# Patient Record
Sex: MALE | Race: WHITE | NOT HISPANIC OR LATINO | Employment: OTHER | URBAN - METROPOLITAN AREA
[De-identification: names, ages, dates, MRNs, and addresses within clinical notes are randomized per-mention and may not be internally consistent; named-entity substitution may affect disease eponyms.]

---

## 2017-01-17 PROBLEM — R07.9 CHEST PAIN: Status: ACTIVE | Noted: 2017-01-17

## 2017-01-17 PROBLEM — R00.0 TACHYCARDIA: Status: ACTIVE | Noted: 2017-01-17

## 2017-01-19 ENCOUNTER — OFFICE VISIT (OUTPATIENT)
Dept: CARDIOLOGY | Facility: CLINIC | Age: 72
End: 2017-01-19
Attending: FAMILY MEDICINE
Payer: COMMERCIAL

## 2017-01-19 VITALS
BODY MASS INDEX: 22.54 KG/M2 | HEIGHT: 65 IN | WEIGHT: 135.3 LBS | DIASTOLIC BLOOD PRESSURE: 58 MMHG | SYSTOLIC BLOOD PRESSURE: 144 MMHG | HEART RATE: 74 BPM

## 2017-01-19 DIAGNOSIS — R00.0 TACHYCARDIA: Primary | ICD-10-CM

## 2017-01-19 DIAGNOSIS — I47.10 SVT (SUPRAVENTRICULAR TACHYCARDIA) (H): ICD-10-CM

## 2017-01-19 DIAGNOSIS — E78.5 HYPERLIPIDEMIA WITH TARGET LDL LESS THAN 130: ICD-10-CM

## 2017-01-19 PROCEDURE — 99204 OFFICE O/P NEW MOD 45 MIN: CPT | Mod: 25 | Performed by: INTERNAL MEDICINE

## 2017-01-19 PROCEDURE — 93000 ELECTROCARDIOGRAM COMPLETE: CPT | Performed by: INTERNAL MEDICINE

## 2017-01-19 RX ORDER — FLUTICASONE PROPIONATE 50 MCG
1 SPRAY, SUSPENSION (ML) NASAL PRN
COMMUNITY
End: 2022-11-30

## 2017-01-19 RX ORDER — SIMVASTATIN 20 MG
10 TABLET ORAL AT BEDTIME
COMMUNITY
End: 2018-12-17

## 2017-01-19 NOTE — PROGRESS NOTES
HPI and Plan:   See dictation    Orders Placed This Encounter   Procedures     EKG 12-lead complete w/read - Clinics (performed today)     Orders Placed This Encounter   Medications     simvastatin (ZOCOR) 20 MG tablet     Sig: Take 10 mg by mouth At Bedtime     fluticasone (FLONASE) 50 MCG/ACT spray     Sig: Spray 1 spray into both nostrils as needed for rhinitis or allergies     Medications Discontinued During This Encounter   Medication Reason     simvastatin (ZOCOR) 20 MG tablet Medication Reconciliation Clean Up         Encounter Diagnoses   Name Primary?     Tachycardia Yes     Hyperlipidemia with target LDL less than 130      SVT (supraventricular tachycardia) (H)        CURRENT MEDICATIONS:  Current Outpatient Prescriptions   Medication Sig Dispense Refill     simvastatin (ZOCOR) 20 MG tablet Take 10 mg by mouth At Bedtime       fluticasone (FLONASE) 50 MCG/ACT spray Spray 1 spray into both nostrils as needed for rhinitis or allergies       zolpidem (AMBIEN) 5 MG tablet Take 1 tablet (5 mg) by mouth nightly as needed for sleep 30 tablet 1     albuterol (PROAIR HFA, PROVENTIL HFA, VENTOLIN HFA) 108 (90 BASE) MCG/ACT inhaler Inhale 2 puffs into the lungs every 6 hours as needed for shortness of breath / dyspnea or wheezing 3 Inhaler 1     fluticasone-salmeterol (ADVAIR) 500-50 MCG/DOSE diskus inhaler Inhale 1 puff into the lungs every 12 hours       aspirin 81 MG tablet Take by mouth daily       Omega-3 Fatty Acids (OMEGA-3 FISH OIL PO)          ALLERGIES     Allergies   Allergen Reactions     Cyclobenzaprine Unknown     Flushing     Seasonal Allergies      Spring and fall allergies       PAST MEDICAL HISTORY:  Past Medical History   Diagnosis Date     Deafness in right ear 2002     Ringing in ears      HL (hearing loss)      Left     Tachycardia        PAST SURGICAL HISTORY:  Past Surgical History   Procedure Laterality Date     Ent surgery  1979     ear surgery       FAMILY HISTORY:  Family History   Problem  "Relation Age of Onset     CANCER Father 62     Lung cancer as a result of smoking     High cholesterol Mother      HEART DISEASE Maternal Grandfather      HEART DISEASE Paternal Grandmother      HEART DISEASE Paternal Grandfather      CEREBROVASCULAR DISEASE Maternal Grandfather      Allergies Mother      Asthma Father      High cholesterol Mother      High cholesterol Father        SOCIAL HISTORY:  Social History     Social History     Marital Status:      Spouse Name: N/A     Number of Children: N/A     Years of Education: N/A     Social History Main Topics     Smoking status: Never Smoker      Smokeless tobacco: Never Used     Alcohol Use: 0.0 oz/week     0 Standard drinks or equivalent per week      Comment: 2 galsses most days     Drug Use: No     Sexual Activity: Yes     Other Topics Concern     Special Diet No     Exercise Yes     Social History Narrative       Review of Systems:  Skin:  Negative     Eyes:  Positive for glasses  ENT:  Positive for hearing loss  Respiratory:  Positive for    Cardiovascular:    Positive for;palpitations  Gastroenterology: Negative    Genitourinary:  Negative    Musculoskeletal:  Negative    Neurologic:  Negative    Psychiatric:  Negative    Heme/Lymph/Imm:  Positive for allergies  Endocrine:  Negative            Physical Exam:  Vitals: /58 mmHg  Pulse 74  Ht 1.651 m (5' 5\")  Wt 61.372 kg (135 lb 4.8 oz)  BMI 22.52 kg/m2  Constitutional: cooperative, alert and oriented, well developed, well nourished, in no acute distress   Skin: warm and dry to the touch, no apparent skin lesions or masses noted   Head: normocephalic, no masses or lesions   Eyes: pupils equal and round, conjunctivae and lids unremarkable, sclera white, no xanthalasma, EOMS intact, no nystagmus   ENT: no pallor or cyanosis, dentition good   Neck: carotid pulses are full and equal bilaterally, JVP normal, no carotid bruit, no thyromegaly   Chest: normal breath sounds, clear to auscultation, " normal A-P diameter, normal symmetry, normal respiratory excursion, no use of accessory muscles   Cardiac: regular rhythm, normal S1/S2, no S3 or S4, apical impulse not displaced, no murmurs, gallops or rubs no presence of murmur   Abdomen: abdomen soft, non-tender, BS normoactive, no mass, no HSM, no bruits   Vascular: pulses full and equal, no bruits auscultated   Extremities and Back: no deformities, clubbing, cyanosis, erythema observed   Neurological: affect appropriate, oriented to time, person and place     Recent Lab Results:  LIPID RESULTS:  Lab Results   Component Value Date    CHOL 203* 12/23/2015    HDL 52 12/23/2015    * 12/23/2015    TRIG 147 12/23/2015    CHOLHDLRATIO 4.7 10/23/2014       LIVER ENZYME RESULTS:  No results found for: AST, ALT    CBC RESULTS:  No results found for: WBC, RBC, HGB, HCT, MCV, MCH, MCHC, RDW, PLT    BMP RESULTS:  Lab Results   Component Value Date    GLC 95 12/23/2015        A1C RESULTS:  No results found for: A1C    INR RESULTS:  No results found for: INR        CC  Lux Juarez MD  [facility]  [address] [address]

## 2017-01-19 NOTE — LETTER
1/19/2017    Lux Juarez MD  [facility]  [address]  [place] [address]    RE: Bear Guido       Dear Colleague,    I had the pleasure of seeing Bear Guido in the [facility].    Mr. Guido is a very pleasant 71-year-old gentleman without previous cardiovascular history who presented to the urgent care in 07/2016 with some atypical chest discomfort and palpitations, heart racing.  Urgent care did an EKG, and they describe a junctional tachycardia.  They do not state the rate.  The Care Everywhere states that it is an accelerated junctional rhythm with small P waves may be present, nonspecific ST abnormalities.  Again, no rate is given.  He had a chest x-ray done which was negative.  He had a stress echocardiogram done 2 days following which was normal after 7-1/2 minutes of exercise.  For followup of this from the [facility], it was recommended he follow up with Cardiology and I am seeing him here today.  He has had minimal recurrences more short-lived and is not particularly precipitated by any particular event.  He does have asthma and uses inhalers on a daily basis.  At the time in the summer when it came on, he was sitting in [employer] and doing no regular physical activity.  He is an avid biker and has no difficulty with exercise.  He has no history of myocardial infarction, congestive heart failure or prior stroke.  He denies any PND, orthopnea or pedal edema.  He has no history of sleep apnea.     Outpatient Encounter Prescriptions as of 1/19/2017   Medication Sig Dispense Refill     simvastatin (ZOCOR) 20 MG tablet Take 10 mg by mouth At Bedtime       fluticasone (FLONASE) 50 MCG/ACT spray Spray 1 spray into both nostrils as needed for rhinitis or allergies       zolpidem (AMBIEN) 5 MG tablet Take 1 tablet (5 mg) by mouth nightly as needed for sleep 30 tablet 1     albuterol (PROAIR HFA, PROVENTIL HFA, VENTOLIN HFA) 108 (90 BASE) MCG/ACT  inhaler Inhale 2 puffs into the lungs every 6 hours as needed for shortness of breath / dyspnea or wheezing 3 Inhaler 1     fluticasone-salmeterol (ADVAIR) 500-50 MCG/DOSE diskus inhaler Inhale 1 puff into the lungs every 12 hours       aspirin 81 MG tablet Take by mouth daily       Omega-3 Fatty Acids (OMEGA-3 FISH OIL PO)        [DISCONTINUED] simvastatin (ZOCOR) 20 MG tablet Take 1 tablet (20 mg) by mouth At Bedtime 90 tablet prn     No facility-administered encounter medications on file as of 1/19/2017.       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  EKG today here is normal.  He does have hyperlipidemia.  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  He at this point in time is not interested in any medical therapy given the minor and brief episodes.  He understands that if they should return, he is to contact us for consideration of medical therapy and/or if that fails, consideration of ablative therapy.     Again, thank you for allowing me to participate in the care of your patient.      Sincerely,    OMAYRA WASHINGTON MD     [facility]

## 2017-01-20 NOTE — PROGRESS NOTES
HISTORY OF PRESENT ILLNESS:  Mr. Guido is a very pleasant 71-year-old gentleman without previous cardiovascular history who presented to the urgent care in 07/2016 with some atypical chest discomfort and palpitations, heart racing.  Urgent care did an EKG, and they describe a junctional tachycardia.  They do not state the rate.  The Care Everywhere states that it is an accelerated junctional rhythm with small P waves may be present, nonspecific ST abnormalities.  Again, no rate is given.  He had a chest x-ray done which was negative.  He had a stress echocardiogram done 2 days following which was normal after 7-1/2 minutes of exercise.  For followup of this from the [facility], it was recommended he follow up with Cardiology and I am seeing him here today.  He has had minimal recurrences more short-lived and is not particularly precipitated by any particular event.  He does have asthma and uses inhalers on a daily basis.  At the time in the summer when it came on, he was sitting in [employer] and doing no regular physical activity.  He is an avid biker and has no difficulty with exercise.  He has no history of myocardial infarction, congestive heart failure or prior stroke.  He denies any PND, orthopnea or pedal edema.  He has no history of sleep apnea.      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  EKG today here is normal.  He does have hyperlipidemia.  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  He at this point in time is not interested in any medical therapy given the minor and brief episodes.  He understands that if they should return, he is to contact us for consideration of medical therapy and/or if that fails, consideration of ablative therapy.      Ernesto Washington MD, [facility]         ERNESTO WASHINGTON MD FACC              D: 2017 14:07   T: 2017 03:39   MT: ELIOT      Name:     LUIS MIGUEL GUZMAN   MRN:      [id number]        Account:      [id number]   :      1945           Service Date: 2017      Document: [id number]

## 2017-02-06 ENCOUNTER — TELEPHONE (OUTPATIENT)
Dept: FAMILY MEDICINE | Facility: CLINIC | Age: 72
End: 2017-02-06

## 2017-05-03 DIAGNOSIS — F51.02 TRANSIENT INSOMNIA: ICD-10-CM

## 2017-05-03 RX ORDER — ZOLPIDEM TARTRATE 5 MG/1
5 TABLET ORAL
Qty: 30 TABLET | Refills: 1 | Status: SHIPPED | OUTPATIENT
Start: 2017-05-03 | End: 2018-03-15

## 2017-05-03 NOTE — TELEPHONE ENCOUNTER
MP,    Controlled Substance Refill Request for Ambien    Last refill: 2/17/2017 #30    Last clinic visit: 9/28/2016     Clinic visit frequency required: Q 12 months  Next appt: none    Controlled substance agreement on file: No.    Documentation in problem list reviewed:  Yes    Processing:  Fax Rx to [facility]    [place]) reviewed:  reviewed- no concerns  [place]:  [email or web address]    Please authorize if appropriate.  Thanks,  Tanisha ALMARAZ RN

## 2017-05-03 NOTE — TELEPHONE ENCOUNTER
Zolpidem       Last Written Prescription Date:  10/10/2016  Last Fill Quantity: 30,   # refills: 1  Last Office Visit with [facility], [facility] or  Health prescribing provider: 09/28/2016  Future Office visit:       Routing refill request to provider for review/approval because:  Drug not on the [facility], [facility] or  Health refill protocol or controlled substance

## 2017-06-26 ENCOUNTER — OFFICE VISIT (OUTPATIENT)
Dept: FAMILY MEDICINE | Facility: CLINIC | Age: 72
End: 2017-06-26
Payer: COMMERCIAL

## 2017-06-26 VITALS
SYSTOLIC BLOOD PRESSURE: 112 MMHG | DIASTOLIC BLOOD PRESSURE: 68 MMHG | BODY MASS INDEX: 22.56 KG/M2 | OXYGEN SATURATION: 98 % | TEMPERATURE: 97.5 F | WEIGHT: 135.44 LBS | HEIGHT: 65 IN | HEART RATE: 89 BPM

## 2017-06-26 DIAGNOSIS — H01.001 BLEPHARITIS OF RIGHT UPPER EYELID, UNSPECIFIED TYPE: Primary | ICD-10-CM

## 2017-06-26 PROCEDURE — 99213 OFFICE O/P EST LOW 20 MIN: CPT | Performed by: FAMILY MEDICINE

## 2017-06-26 RX ORDER — CEPHALEXIN 500 MG/1
500 CAPSULE ORAL 3 TIMES DAILY
Qty: 30 CAPSULE | Refills: 0 | Status: SHIPPED | OUTPATIENT
Start: 2017-06-26 | End: 2017-06-26 | Stop reason: ALTCHOICE

## 2017-06-26 RX ORDER — MINOCYCLINE HYDROCHLORIDE 50 MG/1
50 CAPSULE ORAL 2 TIMES DAILY
Qty: 42 CAPSULE | Refills: 0 | Status: SHIPPED | OUTPATIENT
Start: 2017-06-26 | End: 2017-07-17

## 2017-06-26 NOTE — MR AVS SNAPSHOT
"              After Visit Summary   6/26/2017    Bear Guido    MRN: [id number]           Patient Information     Date Of Birth          1945        Visit Information        Provider Department      6/26/2017 12:45 PM Brunilda Julien MD [facility]        Today's Diagnoses     Moderate persistent asthma    -  1    Cellulitis of face        Blepharitis of right upper eyelid, unspecified type           Follow-ups after your visit        Who to contact     If you have questions or need follow up information about today's clinic visit or your schedule please contact [facility] directly at [phone].  Normal or non-critical lab and imaging results will be communicated to you by MyChart, letter or phone within 4 business days after the clinic has received the results. If you do not hear from us within 7 days, please contact the clinic through [employer] or phone. If you have a critical or abnormal lab result, we will notify you by phone as soon as possible.  Submit refill requests through [employer] or call your pharmacy and they will forward the refill request to us. Please allow 3 business days for your refill to be completed.          Additional Information About Your Visit        MyChart Information     [employer] gives you secure access to your electronic health record. If you see a primary care provider, you can also send messages to your care team and make appointments. If you have questions, please call your primary care clinic.  If you do not have a primary care provider, please call [phone] and they will assist you.        Care EveryWhere ID     This is your Care EveryWhere ID. This could be used by other organizations to access your [place] medical records  [id number]        Your Vitals Were     Pulse Temperature Height Pulse Oximetry BMI (Body Mass Index)       89 97.5  F (36.4  C) (Oral) 5' 5\" (1.651 m) 98% 22.54 kg/m2        Blood Pressure from Last 3 Encounters: "   06/26/17 112/68   01/19/17 144/58   09/28/16 100/60    Weight from Last 3 Encounters:   06/26/17 135 lb 7 oz (61.4 kg)   01/19/17 135 lb 4.8 oz (61.4 kg)   09/28/16 132 lb (59.9 kg)              We Performed the Following     Asthma Action Plan (AAP)          Today's Medication Changes          These changes are accurate as of: 6/26/17  1:52 PM.  If you have any questions, ask your nurse or doctor.               Start taking these medicines.        Dose/Directions    minocycline 50 MG capsule   Commonly known as:  MINOCIN/DYNACIN   Used for:  Blepharitis of right upper eyelid, unspecified type   Started by:  Brunilda Julien MD        Dose:  50 mg   Take 1 capsule (50 mg) by mouth 2 times daily for 21 days   Quantity:  42 capsule   Refills:  0            Where to get your medicines      These medications were sent to [place] [address] [place] [address]     Phone:  [phone]     minocycline 50 MG capsule                Primary Care Provider Office Phone # Fax #    Lux Juarez [phone]       [facility] [address] [place] [address]        Equal Access to Services     NAZIA WHITAKER AH: Hadii mckenzie ku hadasho Soomaali, waaxda luqadaha, qaybta kaalmada adeegyada, leoncio correiain haymanfredn rosaline morales. So [facility] [phone].    ATENCIÓN: Si habla español, tiene a garcias disposición servicios gratuitos de asistencia lingüística. Riccoame al [phone].    We comply with applicable federal civil rights laws and [place] laws. We do not discriminate on the basis of race, color, national origin, age, disability sex, sexual orientation or gender identity.            Thank you!     Thank you for choosing [facility]  for your care. Our goal is always to provide you with excellent care. Hearing back from our patients is one way we can continue to improve our services. Please take a few  minutes to complete the written survey that you may receive in the mail after your visit with us. Thank you!             Your Updated Medication List - Protect others around you: Learn how to safely use, store and throw away your medicines at www.disposemymeds.org.          This list is accurate as of: 6/26/17  1:52 PM.  Always use your most recent med list.                   Brand Name Dispense Instructions for use Diagnosis    albuterol 108 (90 BASE) MCG/ACT Inhaler    PROAIR HFA/PROVENTIL HFA/VENTOLIN HFA    3 Inhaler    Inhale 2 puffs into the lungs every 6 hours as needed for shortness of breath / dyspnea or wheezing        aspirin 81 MG tablet      Take by mouth daily        FLONASE 50 MCG/ACT spray   Generic drug:  fluticasone      Spray 1 spray into both nostrils as needed for rhinitis or allergies        fluticasone-salmeterol 500-50 MCG/DOSE diskus inhaler    ADVAIR     Inhale 1 puff into the lungs every 12 hours        minocycline 50 MG capsule    MINOCIN/DYNACIN    42 capsule    Take 1 capsule (50 mg) by mouth 2 times daily for 21 days    Blepharitis of right upper eyelid, unspecified type       OMEGA-3 FISH OIL PO           simvastatin 20 MG tablet    ZOCOR     Take 10 mg by mouth At Bedtime        zolpidem 5 MG tablet    AMBIEN    30 tablet    Take 1 tablet (5 mg) by mouth nightly as needed for sleep    Transient insomnia

## 2017-06-26 NOTE — PROGRESS NOTES
SUBJECTIVE:                                                    Bear Guido is a 71 year old male who presents to clinic today for the following health issues:      Eye(s) Problem      Duration: 5 days    Description:  Location: right  Pain: YES  Redness: YES  Discharge: no    Accompanying signs and symptoms: no new products no know foreign object in eye. Painful eye     History (Trauma, foreign body exposure,): None    Precipitating or alleviating factors (contact use): None    Therapies tried and outcome: hot compass      Red and tender eyelid - spreading up and getting worse    Problem list and histories reviewed & adjusted, as indicated.  Additional history: as documented    Patient Active Problem List   Diagnosis     Hyperlipidemia with target LDL less than 130     Moderate persistent asthma     Allergic rhinitis     Traumatic myositis ossificans     Vitreous degeneration     History of hematuria     Hyperglycemia     Transient insomnia     Tachycardia     Chest pain     Past Surgical History:   Procedure Laterality Date     ENT SURGERY  1979    ear surgery       Social History   Substance Use Topics     Smoking status: Never Smoker     Smokeless tobacco: Never Used     Alcohol use 0.0 oz/week     0 Standard drinks or equivalent per week      Comment: 2 galsses most days     Family History   Problem Relation Age of Onset     CANCER Father 62     Lung cancer as a result of smoking     High cholesterol Mother      HEART DISEASE Maternal Grandfather      HEART DISEASE Paternal Grandmother      HEART DISEASE Paternal Grandfather      CEREBROVASCULAR DISEASE Maternal Grandfather      Allergies Mother      Asthma Father      High cholesterol Mother      High cholesterol Father          Current Outpatient Prescriptions   Medication Sig Dispense Refill     minocycline (MINOCIN/DYNACIN) 50 MG capsule Take 1 capsule (50 mg) by mouth 2 times daily for 21 days 42 capsule 0     simvastatin (ZOCOR) 20 MG tablet Take 10 mg  "by mouth At Bedtime       fluticasone-salmeterol (ADVAIR) 500-50 MCG/DOSE diskus inhaler Inhale 1 puff into the lungs every 12 hours       Omega-3 Fatty Acids (OMEGA-3 FISH OIL PO)        zolpidem (AMBIEN) 5 MG tablet Take 1 tablet (5 mg) by mouth nightly as needed for sleep (Patient not taking: Reported on 6/26/2017) 30 tablet 1     fluticasone (FLONASE) 50 MCG/ACT spray Spray 1 spray into both nostrils as needed for rhinitis or allergies       albuterol (PROAIR HFA, PROVENTIL HFA, VENTOLIN HFA) 108 (90 BASE) MCG/ACT inhaler Inhale 2 puffs into the lungs every 6 hours as needed for shortness of breath / dyspnea or wheezing 3 Inhaler 1     aspirin 81 MG tablet Take by mouth daily         Reviewed and updated as needed this visit by clinical staff       Reviewed and updated as needed this visit by Provider         ROS:  Constitutional, HEENT, cardiovascular, pulmonary, gi and gu systems are negative, except as otherwise noted.    OBJECTIVE:                                                    /68 (BP Location: Left arm, Patient Position: Left side, Cuff Size: Adult Regular)  Pulse 89  Temp 97.5  F (36.4  C) (Oral)  Ht 5' 5\" (1.651 m)  Wt 135 lb 7 oz (61.4 kg)  SpO2 98%  BMI 22.54 kg/m2  Body mass index is 22.54 kg/(m^2).  GENERAL APPEARANCE: healthy, alert and no distress  HENT: right eyelid puffy red and swollen spreading to upper eyelid near brow  Tender  EYE: eom intact pupils equal and round         ASSESSMENT/PLAN:                                                    1. Blepharitis of right upper eyelid, unspecified type  Encouraged to call if no better  Cautioned about avoiding sun with this medication  - minocycline (MINOCIN/DYNACIN) 50 MG capsule; Take 1 capsule (50 mg) by mouth 2 times daily for 21 days  Dispense: 42 capsule; Refill: 0      Follow up with Provider - as needed     Brunilda Julien MD  [facility]  '  "

## 2017-06-27 ASSESSMENT — ASTHMA QUESTIONNAIRES: ACT_TOTALSCORE: 25

## 2017-09-21 ENCOUNTER — OFFICE VISIT (OUTPATIENT)
Dept: FAMILY MEDICINE | Facility: CLINIC | Age: 72
End: 2017-09-21
Payer: COMMERCIAL

## 2017-09-21 VITALS
SYSTOLIC BLOOD PRESSURE: 104 MMHG | HEART RATE: 84 BPM | OXYGEN SATURATION: 95 % | DIASTOLIC BLOOD PRESSURE: 72 MMHG | TEMPERATURE: 98.7 F | WEIGHT: 135 LBS | BODY MASS INDEX: 22.49 KG/M2 | RESPIRATION RATE: 14 BRPM | HEIGHT: 65 IN

## 2017-09-21 DIAGNOSIS — F51.02 TRANSIENT INSOMNIA: ICD-10-CM

## 2017-09-21 DIAGNOSIS — R19.5 LOOSE STOOLS: ICD-10-CM

## 2017-09-21 DIAGNOSIS — Z00.00 ROUTINE HISTORY AND PHYSICAL EXAMINATION OF ADULT: Primary | ICD-10-CM

## 2017-09-21 DIAGNOSIS — Z23 FLU VACCINE NEED: ICD-10-CM

## 2017-09-21 DIAGNOSIS — E78.5 HYPERLIPIDEMIA WITH TARGET LDL LESS THAN 130: ICD-10-CM

## 2017-09-21 DIAGNOSIS — Z12.11 SPECIAL SCREENING FOR MALIGNANT NEOPLASMS, COLON: ICD-10-CM

## 2017-09-21 PROCEDURE — 90662 IIV NO PRSV INCREASED AG IM: CPT | Performed by: FAMILY MEDICINE

## 2017-09-21 PROCEDURE — G0008 ADMIN INFLUENZA VIRUS VAC: HCPCS | Performed by: FAMILY MEDICINE

## 2017-09-21 PROCEDURE — 99213 OFFICE O/P EST LOW 20 MIN: CPT | Mod: 25 | Performed by: FAMILY MEDICINE

## 2017-09-21 PROCEDURE — 99397 PER PM REEVAL EST PAT 65+ YR: CPT | Mod: 25 | Performed by: FAMILY MEDICINE

## 2017-09-21 RX ORDER — SIMVASTATIN 20 MG
10 TABLET ORAL AT BEDTIME
Qty: 90 TABLET | Refills: 1 | Status: CANCELLED | OUTPATIENT
Start: 2017-09-21

## 2017-09-21 RX ORDER — ZOLPIDEM TARTRATE 5 MG/1
5 TABLET ORAL
Qty: 30 TABLET | Refills: 1 | Status: CANCELLED | OUTPATIENT
Start: 2017-09-21

## 2017-09-21 NOTE — PROGRESS NOTES
SUBJECTIVE:   Bear Guido is a 72 year old male who presents for Preventive Visit.    Are you in the first 12 months of your Medicare Part B coverage?  No    Healthy Habits:    Do you get at least three servings of calcium containing foods daily (dairy, green leafy vegetables, etc.)? yes    Amount of exercise or daily activities, outside of work: 7 day(s) per week    Problems taking medications regularly No    Medication side effects: No    Have you had an eye exam in the past two years? yes    Do you see a dentist twice per year? No-once    Do you have sleep apnea, excessive snoring or daytime drowsiness?no    COGNITIVE SCREEN  1) Repeat 3 items (Banana, Sunrise, Chair)    2) Clock draw: NORMAL  3) 3 item recall: Recalls 3 objects  Results: 3 items recalled: COGNITIVE IMPAIRMENT LESS LIKELY    Mini-CogTM Copyright S Helder. Licensed by the author for use in [facility]; reprinted with permission (cruz@[facility]). All rights reserved.      Concerns: change in bowel habits    Reviewed and updated as needed this visit [facility]  M[facility]         Reviewed and updated as needed this visit by Provider        Social History   Substance Use Topics     Smoking status: Never Smoker     Smokeless tobacco: Never Used     Alcohol use 0.0 oz/week     0 Standard drinks or equivalent per week      Comment: 2 galsses most days       The patient does not drink >3 drinks per day nor >7 drinks per week.    Today's PHQ-2 Score:   PHQ-2 ( 1999 Pfizer) 9/28/2016 9/28/2016   Q1: Little interest or pleasure in doing things 0 0   Q2: Feeling down, depressed or hopeless 0 0   PHQ-2 Score 0 0         Do you feel safe in your environment - Yes    Do you have a Health Care Directive?: Yes: Patient states has Advance Directive and will bring in a copy to clinic.    Current providers sharing in care for this patient include: Patient Care Team:  Russell Alcantara MD as PCP - General (Family Practice)      Hearing  impairment: Yes, Deaf in right ear     Difficulty following a conversation in a noisy restaurant or crowded room.    Need to ask people to speak up or repeat themselves.    Difficulty understanding soft or whispered speech.    Sometimes wears hearing aids    Ability to successfully perform activities of daily living: Yes, no assistance needed     Fall risk:  Fallen 2 or more times in the past year?: No  Any fall with injury in the past year?: No      Home safety:  lack of grab bars in the bathroom      The following health maintenance items are reviewed in Epic and correct as of today:Health Maintenance   Topic Date Due     HEPATITIS C SCREENING  08/19/1964     ADVANCE DIRECTIVE PLANNING Q5 YRS  08/19/2001     PNEUMOCOCCAL (2 of 2 - PPSV23) 10/21/2016     INFLUENZA VACCINE (SYSTEM ASSIGNED)  09/01/2017     FALL RISK ASSESSMENT  09/28/2017     ASTHMA CONTROL TEST Q6 MOS  12/26/2017     ASTHMA ACTION PLAN Q1 YR  06/26/2018     COLONOSCOPY Q10 YR  10/01/2018     LIPID SCREEN Q5 YR MALE (SYSTEM ASSIGNED)  12/23/2020     TETANUS IMMUNIZATION (SYSTEM ASSIGNED)  01/06/2026     AORTIC ANEURYSM SCREENING (SYSTEM ASSIGNED)  Completed     BP Readings from Last 3 Encounters:   09/21/17 104/72   06/26/17 112/68   01/19/17 144/58    Wt Readings from Last 3 Encounters:   09/21/17 135 lb (61.2 kg)   06/26/17 135 lb 7 oz (61.4 kg)   01/19/17 135 lb 4.8 oz (61.4 kg)                  Patient Active Problem List   Diagnosis     Hyperlipidemia with target LDL less than 130     Moderate persistent asthma     Allergic rhinitis     Traumatic myositis ossificans     Vitreous degeneration     History of hematuria     Hyperglycemia     Transient insomnia     Tachycardia     Chest pain     Past Surgical History:   Procedure Laterality Date     ENT SURGERY  1979    ear surgery       Social History   Substance Use Topics     Smoking status: Never Smoker     Smokeless tobacco: Never Used     Alcohol use 0.0 oz/week     0 Standard drinks or  equivalent per week      Comment: 2 galsses most days     Family History   Problem Relation Age of Onset     High cholesterol Mother      Allergies Mother      CANCER Father 62     Lung cancer as a result of smoking     Asthma Father      High cholesterol Father      HEART DISEASE Maternal Grandfather      CEREBROVASCULAR DISEASE Maternal Grandfather      HEART DISEASE Paternal Grandmother      HEART DISEASE Paternal Grandfather          Current Outpatient Prescriptions   Medication Sig Dispense Refill     zolpidem (AMBIEN) 5 MG tablet Take 1 tablet (5 mg) by mouth nightly as needed for sleep 30 tablet 1     simvastatin (ZOCOR) 20 MG tablet Take 10 mg by mouth At Bedtime       fluticasone (FLONASE) 50 MCG/ACT spray Spray 1 spray into both nostrils as needed for rhinitis or allergies       albuterol (PROAIR HFA, PROVENTIL HFA, VENTOLIN HFA) 108 (90 BASE) MCG/ACT inhaler Inhale 2 puffs into the lungs every 6 hours as needed for shortness of breath / dyspnea or wheezing 3 Inhaler 1     fluticasone-salmeterol (ADVAIR) 500-50 MCG/DOSE diskus inhaler Inhale 1 puff into the lungs every 12 hours       Omega-3 Fatty Acids (OMEGA-3 FISH OIL PO)        aspirin 81 MG tablet Take by mouth daily       Allergies   Allergen Reactions     Cyclobenzaprine Unknown     Flushing     Seasonal Allergies      Spring and fall allergies     Recent Labs   Lab Test  12/23/15   0908  10/23/14   0808   LDL  122*  138*   HDL  52  50   TRIG  147  244*              Pneumonia Vaccine:   Immunization History   Administered Date(s) Administered     HEPA 11/15/1995, 02/17/1997     HepB 09/30/2009, 12/08/2009, 09/30/2010     Influenza (High Dose) 3 valent vaccine 10/21/2015, 09/28/2016, 09/21/2017     Influenza (IIV3) 10/15/2014     Pneumococcal (PCV 13) 10/21/2015     Pneumococcal 23 valent 07/20/2009     TDAP Vaccine (Adacel) 01/06/2016     Tdap (Adacel,Boostrix) 12/28/2007     Typhoid Oral 10/20/2014     Zoster vaccine, live 10/28/2014   UTD      in  "addition to health maintanance patient would like to discuss the following problem:  He also complains of intermittent loose stool, gas and flatulence at today's visit.  Normally has firm regular stools  Not every day but frequently, started maybe 6 weeks or more ago  No dark stool or blood  No dietary changes No dysphagia.     Problem pertinent Exam shows no abdominal tenderness or mass.     Reviewed the criteria for diagnosing irritable bowel syndrome is primarily a diagnosis of exclusion.  It may be that but we cannot rule out the possibility of mass or other problem    Recommended checking some labs when he comes back for future orders for lipid panel  Also referral to gastroenterology for additional evaluation    STD screening:  History   Sexual Activity     Sexual activity: Yes     no screening desired today        ROS: As per HPI.  Constitutional: no recent illness, no fevers/sweats/chills, sleep normal  Eyes: No vision changes or eye irritation  Ears/Nose/Throat: No runny nose, sore throat or ear pain  CV: no palpitations, no chest pain, no lower extremity swelling.  Resp: no shortness of breath, wheezing, or cough.  GI: no nausea/vomiting/diarrhea, normal stooling pattern, no reflux symptoms, no black or bloody stools  : no burning or urgency with urination, no blood in urine, no sores or discharge.  Skin: no changing moles or other lesions, no rash  Musculoskeletal: no joint pain, muscle pain, weakness, trauma or injury  Psych: no depression, no concerns about anxiety  Neuro: no new headaches, dizziness    Objective:  /72  Pulse 84  Temp 98.7  F (37.1  C) (Oral)  Resp 14  Ht 5' 5\" (1.651 m)  Wt 135 lb (61.2 kg)  SpO2 95%  BMI 22.47 kg/m2  General Appearance: Pleasant, alert, WN/WD in no acute respiratory distress.  Head Exam: Normal. Normocephalic, atraumatic. No sinus tenderness.  Eye Exam: Normal external eye, conjunctiva, lids. JHONATAN.  Ear Exam: Normal auditory canals and external ears. " "Non-tender.  Left TM-normal. Right TM-normal.  OroPharynx Exam: Dental hygiene adequate. Normal buccal mucosa. Normal pharynx.  Neck Exam: Supple, no masses or enlarged, tender nodes.  Thyroid Exam: No nodules or enlargement or tenderness.  Chest/Respiratory Exam: Normal, comfortable, easy respirations. Chest wall normal. Lungs are clear to auscultation. No wheezing, crackles, or rhonchi.  Cardiovascular Exam: Regular rate and rhythm. No murmur, gallop, or rubs. No pedal edema.  Gastrointestinal Exam: Soft, non-tender, no masses or organomegaly.  Musculoskeletal Exam: Back is non-tender, full ROM of upper and lower extremities.  Skin: no rash, warm and dry.  Neurologic Exam: Nonfocal, no tremor. Normal gait.  Psychiatric Exam: Alert - appropriate, normal affect    ASSESSMENT/PLAN:    ICD-10-CM    1. Routine history and physical examination of adult Z00.00    2. Transient insomnia F51.02    3. Hyperlipidemia with target LDL less than 130 E78.5 **Lipid panel reflex to direct LDL FUTURE anytime   4. Special screening for malignant neoplasms, colon Z12.11 GASTROENTEROLOGY ADULT REF PROCEDURE ONLY   5. Loose stools R19.5 **Comprehensive metabolic panel FUTURE 1yr   6. Flu vaccine need Z23 FLU VACCINE, INCREASED ANTIGEN, PRESV FREE       End of Life Planning:  Patient currently has an advanced directive: not on file    COUNSELING:  Reviewed preventive health counseling, as reflected in patient instructions       Regular exercise       Healthy diet/nutrition       Vision screening       Hearing screening       Colon cancer screening       Prostate cancer screening        Estimated body mass index is 22.54 kg/(m^2) as calculated from the following:    Height as of 6/26/17: 5' 5\" (1.651 m).    Weight as of 6/26/17: 135 lb 7 oz (61.4 kg).     reports that he has never smoked. He has never used smokeless tobacco.        Appropriate preventive services were discussed with this patient, including applicable screening as " appropriate for cardiovascular disease, diabetes, osteopenia/osteoporosis, and glaucoma.  As appropriate for age/gender, discussed screening for colorectal cancer, prostate cancer, breast cancer, and cervical cancer. Checklist reviewing preventive services available has been given to the patient.    Reviewed patients plan of care and provided an AVS. The Intermediate Care Plan ( asthma action plan, low back pain action plan, and migraine action plan) for Bear meets the Care Plan requirement. This Care Plan has been established and reviewed with the Patient.    Counseling Resources:  ATP IV Guidelines  Pooled Cohorts Equation Calculator  Breast Cancer Risk Calculator  FRAX Risk Assessment  ICSI Preventive Guidelines  Dietary Guidelines for Americans, 2010  USDA's MyPlate  ASA Prophylaxis  Lung CA Screening    Russell Murali Alcantara MD  [facility]

## 2017-09-21 NOTE — MR AVS SNAPSHOT
After Visit Summary   9/21/2017    Bear Guido    MRN: [id number]           Patient Information     Date Of Birth          1945        Visit Information        Provider Department      9/21/2017 4:00 PM Russell Alcantara MD [facility]        Today's Diagnoses     Routine history and physical examination of adult    -  1    Transient insomnia        Hyperlipidemia with target LDL less than 130        Special screening for malignant neoplasms, colon        Loose stools        Flu vaccine need          Care Instructions      Preventive Health Recommendations:       Male Ages 65 and over    Yearly exam:             See your health care provider every year in order to  o   Review health changes.   o   Discuss preventive care.    o   Review your medicines if your doctor has prescribed any.    Talk with your health care provider about whether you should have a test to screen for prostate cancer (PSA).    Every 3 years, have a diabetes test (fasting glucose). If you are at risk for diabetes, you should have this test more often.    Every 5 years, have a cholesterol test. Have this test more often if you are at risk for high cholesterol or heart disease.     Every 10 years, have a colonoscopy. Or, have a yearly FIT test (stool test). These exams will check for colon cancer.    Talk to with your health care provider about screening for Abdominal Aortic Aneurysm if you have a family history of AAA or have a history of smoking.  Shots:     Get a flu shot each year.     Get a tetanus shot every 10 years.     Talk to your doctor about your pneumonia vaccines. There are now two you should receive - Pneumovax (PPSV 23) and Prevnar (PCV 13).    Talk to your doctor about a shingles vaccine.     Talk to your doctor about the hepatitis B vaccine.  Nutrition:     Eat at least 5 servings of fruits and vegetables each day.     Eat whole-grain bread, whole-wheat pasta and brown rice instead of white  grains and rice.     Talk to your doctor about Calcium and Vitamin D.   Lifestyle    Exercise for at least 150 minutes a week (30 minutes a day, 5 days a week). This will help you control your weight and prevent disease.     Limit alcohol to one drink per day.     No smoking.     Wear sunscreen to prevent skin cancer.     See your dentist every six months for an exam and cleaning.     See your eye doctor every 1 to 2 years to screen for conditions such as glaucoma, macular degeneration and cataracts.          Follow-ups after your visit        Additional Services     GASTROENTEROLOGY ADULT REF PROCEDURE ONLY       Last Lab Result: No results found for: CR  Body mass index is 22.47 kg/(m^2).      Patient will be contacted to schedule procedure.     Please be aware that coverage of these services is subject to the terms and limitations of your health insurance plan.  Call member services at your health plan with any benefit or coverage questions.  Any procedures must be performed at a [facility] facility OR coordinated by your clinic's referral office.    Please bring the following with you to your appointment:    (1) Any X-Rays, CTs or MRIs which have been performed.  Contact the facility where they were done to arrange for  prior to your scheduled appointment.    (2) List of current medications   (3) This referral request   (4) Any documents/labs given to you for this referral                  Your next 10 appointments already scheduled     Oct 10, 2017   Procedure with Ervin Irizarry MD   [facility] ([facility])    [address]  [place] [address] [phone]           [facility] is located at [address] [place]              Who to contact     If you have questions or need follow up information about today's clinic visit or your schedule please contact [facility] directly at [phone].  Normal or non-critical lab and imaging  "results will be communicated to you by MyChart, letter or phone within 4 business days after the clinic has received the results. If you do not hear from us within 7 days, please contact the clinic through [employer] or phone. If you have a critical or abnormal lab result, we will notify you by phone as soon as possible.  Submit refill requests through [employer] or call your pharmacy and they will forward the refill request to us. Please allow 3 business days for your refill to be completed.          Additional Information About Your Visit        [employer] Information     [employer] gives you secure access to your electronic health record. If you see a primary care provider, you can also send messages to your care team and make appointments. If you have questions, please call your primary care clinic.  If you do not have a primary care provider, please call [phone] and they will assist you.        Care EveryWhere ID     This is your Care EveryWhere ID. This could be used by other organizations to access your [facility] medical records  [id number]        Your Vitals Were     Pulse Temperature Respirations Height Pulse Oximetry BMI (Body Mass Index)    84 98.7  F (37.1  C) (Oral) 14 5' 5\" (1.651 m) 95% 22.47 kg/m2       Blood Pressure from Last 3 Encounters:   09/21/17 104/72   06/26/17 112/68   01/19/17 144/58    Weight from Last 3 Encounters:   09/21/17 135 lb (61.2 kg)   06/26/17 135 lb 7 oz (61.4 kg)   01/19/17 135 lb 4.8 oz (61.4 kg)              We Performed the Following     FLU VACCINE, INCREASED ANTIGEN, PRESV FREE     GASTROENTEROLOGY ADULT REF PROCEDURE ONLY     OFFICE/OUTPT VISIT,EST,LEVL III        Primary Care Provider Office Phone # Fax #    Russell Murali Alcantara [phone] [address] [facility] [address]        Equal Access to Services     NAZIA WHITAKER : Gregory Johnson, waaxda luqadaha, qaybta kaalmalibby lazar, leoncio mathews . " So [facility] [phone].    ATENCIÓN: Si seniala martina, tiene a garcias disposición servicios gratuitos de asistencia lingüística. Makayla child [phone].    We comply with applicable federal civil rights laws and [place] laws. We do not discriminate on the basis of race, color, national origin, age, disability sex, sexual orientation or gender identity.            Thank you!     Thank you for choosing [facility]  for your care. Our goal is always to provide you with excellent care. Hearing back from our patients is one way we can continue to improve our services. Please take a few minutes to complete the written survey that you may receive in the mail after your visit with us. Thank you!             Your Updated Medication List - Protect others around you: Learn how to safely use, store and throw away your medicines at www.disposemymeds.org.          This list is accurate as of: 9/21/17 11:59 PM.  Always use your most recent med list.                   Brand Name Dispense Instructions for use Diagnosis    albuterol 108 (90 BASE) MCG/ACT Inhaler    PROAIR HFA/PROVENTIL HFA/VENTOLIN HFA    3 Inhaler    Inhale 2 puffs into the lungs every 6 hours as needed for shortness of breath / dyspnea or wheezing        aspirin 81 MG tablet      Take by mouth daily        FLONASE 50 MCG/ACT spray   Generic drug:  fluticasone      Spray 1 spray into both nostrils as needed for rhinitis or allergies        fluticasone-salmeterol 500-50 MCG/DOSE diskus inhaler    ADVAIR     Inhale 1 puff into the lungs every 12 hours        OMEGA-3 FISH OIL PO           simvastatin 20 MG tablet    ZOCOR     Take 10 mg by mouth At Bedtime        zolpidem 5 MG tablet    AMBIEN    30 tablet    Take 1 tablet (5 mg) by mouth nightly as needed for sleep    Transient insomnia

## 2017-09-21 NOTE — NURSING NOTE
"Chief Complaint   Patient presents with     Wellness Visit       Initial /72  Pulse 84  Temp 98.7  F (37.1  C) (Oral)  Resp 14  Ht 5' 5\" (1.651 m)  Wt 135 lb (61.2 kg)  SpO2 95%  BMI 22.47 kg/m2 Estimated body mass index is 22.47 kg/(m^2) as calculated from the following:    Height as of this encounter: 5' 5\" (1.651 m).    Weight as of this encounter: 135 lb (61.2 kg).  BP completed using cuff size: regular    Health Maintenance that is potentially due pending provider review:  Health Maintenance Due   Topic Date Due     HEPATITIS C SCREENING  08/19/1964     ADVANCE DIRECTIVE PLANNING Q5 YRS  08/19/2001     PNEUMOCOCCAL (2 of 2 - PPSV23) 10/21/2016     INFLUENZA VACCINE (SYSTEM ASSIGNED)  09/01/2017     FALL RISK ASSESSMENT  09/28/2017         Labs and vaccines per provider  "

## 2017-10-10 ENCOUNTER — SURGERY (OUTPATIENT)
Age: 72
End: 2017-10-10

## 2017-10-10 ENCOUNTER — HOSPITAL ENCOUNTER (OUTPATIENT)
Facility: CLINIC | Age: 72
Discharge: HOME | End: 2017-10-10
Attending: SPECIALIST | Admitting: SPECIALIST
Payer: MEDICARE

## 2017-10-10 VITALS
RESPIRATION RATE: 18 BRPM | SYSTOLIC BLOOD PRESSURE: 122 MMHG | DIASTOLIC BLOOD PRESSURE: 78 MMHG | OXYGEN SATURATION: 97 %

## 2017-10-10 LAB — COLONOSCOPY: NORMAL

## 2017-10-10 PROCEDURE — 25000128 H RX IP 250 OP 636: Performed by: SPECIALIST

## 2017-10-10 PROCEDURE — G0500 MOD SEDAT ENDO SERVICE >5YRS: HCPCS | Performed by: SPECIALIST

## 2017-10-10 PROCEDURE — 99153 MOD SED SAME PHYS/QHP EA: CPT

## 2017-10-10 PROCEDURE — G0121 COLON CA SCRN NOT HI RSK IND: HCPCS | Performed by: SPECIALIST

## 2017-10-10 PROCEDURE — 45378 DIAGNOSTIC COLONOSCOPY: CPT | Performed by: SPECIALIST

## 2017-10-10 RX ORDER — FENTANYL CITRATE 50 UG/ML
INJECTION, SOLUTION INTRAMUSCULAR; INTRAVENOUS PRN
Status: DISCONTINUED | OUTPATIENT
Start: 2017-10-10 | End: 2017-10-10 | Stop reason: HOSPADM

## 2017-10-10 RX ORDER — ONDANSETRON 2 MG/ML
4 INJECTION INTRAMUSCULAR; INTRAVENOUS
Status: DISCONTINUED | OUTPATIENT
Start: 2017-10-10 | End: 2017-10-10 | Stop reason: HOSPADM

## 2017-10-10 RX ORDER — LIDOCAINE 40 MG/G
CREAM TOPICAL
Status: DISCONTINUED | OUTPATIENT
Start: 2017-10-10 | End: 2017-10-10 | Stop reason: HOSPADM

## 2017-10-10 RX ADMIN — MIDAZOLAM HYDROCHLORIDE 1 MG: 1 INJECTION, SOLUTION INTRAMUSCULAR; INTRAVENOUS at 13:46

## 2017-10-10 RX ADMIN — MIDAZOLAM HYDROCHLORIDE 0.5 MG: 1 INJECTION, SOLUTION INTRAMUSCULAR; INTRAVENOUS at 13:52

## 2017-10-10 RX ADMIN — FENTANYL CITRATE 25 MCG: 50 INJECTION, SOLUTION INTRAMUSCULAR; INTRAVENOUS at 13:52

## 2017-10-10 RX ADMIN — MIDAZOLAM HYDROCHLORIDE 0.5 MG: 1 INJECTION, SOLUTION INTRAMUSCULAR; INTRAVENOUS at 14:05

## 2017-10-10 RX ADMIN — FENTANYL CITRATE 25 MCG: 50 INJECTION, SOLUTION INTRAMUSCULAR; INTRAVENOUS at 14:05

## 2017-10-10 RX ADMIN — FENTANYL CITRATE 50 MCG: 50 INJECTION, SOLUTION INTRAMUSCULAR; INTRAVENOUS at 13:46

## 2017-10-10 NOTE — H&P
Pre-Endoscopy History and Physical     Bear Guido MRN# [id number]   YOB: 1945 Age: 72 year old     Date of Procedure: 10/10/2017  Primary care provider: Russell Alcantara  Type of Endoscopy: Colonoscopy with possible biopsy, possible polypectomy  Reason for Procedure: screening  Type of Anesthesia Anticipated: Conscious Sedation    HPI:    Bear is a 72 year old male who will be undergoing the above procedure.      A history and physical has been performed. The patient's medications and allergies have been reviewed. The risks and benefits of the procedure and the sedation options and risks were discussed with the patient.  All questions were answered and informed consent was obtained.      He denies a personal or family history of anesthesia complications or bleeding disorders.     Patient Active Problem List   Diagnosis     Hyperlipidemia with target LDL less than 130     Moderate persistent asthma     Allergic rhinitis     Traumatic myositis ossificans     Vitreous degeneration     History of hematuria     Hyperglycemia     Transient insomnia     Tachycardia     Chest pain        Past Medical History:   Diagnosis Date     Deafness in right ear 2002     HL (hearing loss)     Left     Ringing in ears      Tachycardia      Uncomplicated asthma         Past Surgical History:   Procedure Laterality Date     ENT SURGERY  1979    ear surgery       Social History   Substance Use Topics     Smoking status: Never Smoker     Smokeless tobacco: Never Used     Alcohol use 0.0 oz/week     0 Standard drinks or equivalent per week      Comment: 1-2 galsses most days       Family History   Problem Relation Age of Onset     High cholesterol Mother      Allergies Mother      CANCER Father 62     Lung cancer as a result of smoking     Asthma Father      High cholesterol Father      HEART DISEASE Maternal Grandfather      CEREBROVASCULAR DISEASE Maternal Grandfather      HEART DISEASE Paternal Grandmother       "HEART DISEASE Paternal Grandfather        Prior to Admission medications    Medication Sig Start Date End Date Taking? Authorizing Provider   simvastatin (ZOCOR) 20 MG tablet Take 10 mg by mouth At Bedtime   Yes Reported, Patient   fluticasone-salmeterol (ADVAIR) 500-50 MCG/DOSE diskus inhaler Inhale 1 puff into the lungs every 12 hours   Yes Reported, Patient   zolpidem (AMBIEN) 5 MG tablet Take 1 tablet (5 mg) by mouth nightly as needed for sleep 5/3/17   Lux Juarez MD   fluticasone (FLONASE) 50 MCG/ACT spray Spray 1 spray into both nostrils as needed for rhinitis or allergies    Reported, Patient   albuterol (PROAIR HFA, PROVENTIL HFA, VENTOLIN HFA) 108 (90 BASE) MCG/ACT inhaler Inhale 2 puffs into the lungs every 6 hours as needed for shortness of breath / dyspnea or wheezing 1/5/16   Lux Juarez MD   aspirin 81 MG tablet Take by mouth daily    Reported, Patient   Omega-3 Fatty Acids (OMEGA-3 FISH OIL PO)     Reported, Patient       Allergies   Allergen Reactions     Cyclobenzaprine Unknown     Flushing     Seasonal Allergies      Spring and fall allergies        REVIEW OF SYSTEMS:   5 point ROS negative except as noted above in HPI, including Gen., Resp., CV, GI &  system review.    PHYSICAL EXAM:   /79  Resp 16  SpO2 94% Estimated body mass index is 22.47 kg/(m^2) as calculated from the following:    Height as of 9/21/17: 1.651 m (5' 5\").    Weight as of 9/21/17: 61.2 kg (135 lb).   GENERAL APPEARANCE: alert, and oriented  MENTAL STATUS: alert  AIRWAY EXAM: Mallampatti Class II (visualization of the soft palate, fauces, and uvula)  RESP: lungs clear to auscultation - no rales, rhonchi or wheezes  CV: regular rates and rhythm  DIAGNOSTICS:    Not indicated    IMPRESSION   ASA Class 2 - Mild systemic disease    PLAN:   Plan for Colonoscopy with possible biopsy, possible polypectomy. We discussed the risks, benefits and alternatives and the patient wished to proceed.    The above has " been forwarded to the consulting provider.      Signed Electronically by: Ervin Irizarry  October 10, 2017

## 2017-10-10 NOTE — BRIEF OP NOTE
[facility] Brief Operative Note    Pre-operative diagnosis: screening   Post-operative diagnosis Normal     Procedure: Procedure(s):  colonoscopy - Wound Class: II-Clean Contaminated   Surgeon(s): Surgeon(s) and Role:     * Ervin Irizarry MD - Primary   Estimated blood loss: * No values recorded between 10/10/2017 12:00 AM and 10/10/2017  2:19 PM *    Specimens: * No specimens in log *   Findings: Please see ProVation procedure note in Chart Review

## 2018-01-09 ENCOUNTER — TELEPHONE (OUTPATIENT)
Dept: FAMILY MEDICINE | Facility: CLINIC | Age: 73
End: 2018-01-09

## 2018-01-09 NOTE — TELEPHONE ENCOUNTER
JF  Symptoms started Sunday at 8pm, feeling weakness, and muscle aches. Next day then had fever of 101.5 taking ibuprofen and brings down temp to  degrees. Persistent cough with more chest tightness than usual. Has been using advair but with inhalation coughs it out so not as effective.    With pt's age and dx of asthma appropriate to send in oseltamivir?  Thank you,  Bhavya Vieira RN

## 2018-01-09 NOTE — TELEPHONE ENCOUNTER
Reason for call:  Patient reporting a symptom    Symptom or request: possible influenza-fever, joint aches, weakness, cough    Duration (how long have symptoms been present): 3 days    Have you been treated for this before? No    Additional comments: patient would like to schedule an OV, please call to discuss symptoms.    Phone Number patient can be reached at:  Home number on file [phone] (home)    Best Time:  asap    Can we leave a detailed message on this number:  YES    Call taken on 1/9/2018 at 1:38 PM by Celeste Vieira  .

## 2018-01-11 NOTE — TELEPHONE ENCOUNTER
Patient informed of below message  Was better last night - today back to feeling poorly  Very low energy, coughing, and fever continues to range from   Was having asthma issues at first d/t coughing - asthma now well controlled he said  Will continue to monitor and call us back if needed  Tanisha ALMARAZ RN

## 2018-01-11 NOTE — TELEPHONE ENCOUNTER
Sorry,72 hours is the cut off, and tamiflu is not terribly helpful, probably wouldn't have been helpful.  PLease call to check on pt.

## 2018-01-19 ENCOUNTER — OFFICE VISIT (OUTPATIENT)
Dept: FAMILY MEDICINE | Facility: CLINIC | Age: 73
End: 2018-01-19
Payer: COMMERCIAL

## 2018-01-19 VITALS
OXYGEN SATURATION: 98 % | SYSTOLIC BLOOD PRESSURE: 114 MMHG | WEIGHT: 133 LBS | HEIGHT: 65 IN | DIASTOLIC BLOOD PRESSURE: 72 MMHG | TEMPERATURE: 98 F | RESPIRATION RATE: 14 BRPM | HEART RATE: 88 BPM | BODY MASS INDEX: 22.16 KG/M2

## 2018-01-19 DIAGNOSIS — H69.92 DYSFUNCTION OF LEFT EUSTACHIAN TUBE: Primary | ICD-10-CM

## 2018-01-19 PROCEDURE — 99213 OFFICE O/P EST LOW 20 MIN: CPT | Performed by: FAMILY MEDICINE

## 2018-01-19 NOTE — PROGRESS NOTES
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  He reports that over the past 3 days has been eating muffled sounds and decreased hearing on the left ear.  The patient has a history of deafness of right ear due to an infection and has been wearing hearing aid on the left to augment hearing. He denies any fevers or chills. Have been using OTC medications for congestion x 5 days.   Denies any other respiratory symptoms.  Denies ear pain, just pressure.     Problem list and histories reviewed & adjusted, as indicated.  Additional history: as documented    Patient Active Problem List   Diagnosis     Hyperlipidemia with target LDL less than 130     Moderate persistent asthma     Allergic rhinitis     Traumatic myositis ossificans     Vitreous degeneration     History of hematuria     Hyperglycemia     Transient insomnia     Tachycardia     Chest pain     Past Surgical History:   Procedure Laterality Date     COLONOSCOPY N/A 10/10/2017    Procedure: COLONOSCOPY;  colonoscopy;  Surgeon: Ervin Irizarry MD;  Location:  GI     ENT SURGERY  [facility]    ear surgery       Social History   Substance Use Topics     Smoking status: Never Smoker     Smokeless tobacco: Never Used     Alcohol use 0.0 oz/week     0 Standard drinks or equivalent per week      Comment: 1-2 galsses most days     Family History   Problem Relation Age of Onset     High cholesterol Mother      Allergies Mother      CANCER Father 62     Lung cancer as a result of  "smoking     Asthma Father      High cholesterol Father      HEART DISEASE Maternal Grandfather      CEREBROVASCULAR DISEASE Maternal Grandfather      HEART DISEASE Paternal Grandmother      HEART DISEASE Paternal Grandfather              Reviewed and updated as needed this visit by clinical staff       ROS:  Constitutional, HEENT, cardiovascular, pulmonary, gi and gu systems are negative, except as otherwise noted.      OBJECTIVE:   /72  Pulse 88  Temp 98  F (36.7  C) (Tympanic)  Resp 14  Ht 5' 5\" (1.651 m)  Wt 133 lb (60.3 kg)  SpO2 98%  BMI 22.13 kg/m2  Body mass index is 22.13 kg/(m^2).  GENERAL: healthy, alert and no distress  HENT: Ear canals patent.  Clear fluid effusion behind the left tympanic membrane.  Tympanic membrane is intact, nonbulging,  no erythema was noted.  RESP: coarse breath sounds in the right lung field.  CV: regular rate and rhythm, normal S1 S2, no S3 or S4, no murmur, click or rub, no peripheral edema and peripheral pulses strong    ASSESSMENT/PLAN:   1. Dysfunction of left eustachian tube  Assessment: Discussed eustachian tube dysfunction, causes, and OTC vs RX treatments  Plan:  -  I advised him to continue trying to autoinsufflate the ears several times per day.  I advised him that 80% of patients will have resolution of their eustachian tube dysfunction after approximately 2 months of symptoms.  If he does not experience return of adequate hearing, he should return to clinic for referral to ENT and audiogram/ reevaluation.  - Flonase as needed.     Khris Lane MD  [facility]  PAGER: [phone]        "

## 2018-01-19 NOTE — MR AVS SNAPSHOT
After Visit Summary   1/19/2018    Bear Guido    MRN: [id number]           Patient Information     Date Of Birth          1945        Visit Information        Provider Department      1/19/2018 11:00 AM Khris Lane MD [facility]        Today's Diagnoses     Dysfunction of left eustachian tube    -  1      Care Instructions      Anatomy of the Ear    The ear is a complex and delicate organ. It collects sound waves so you can hear the world around you. The ear also has a second function--it helps you keep your balance. Your ear can be divided into 3 parts. The outer ear and middle ear help collect and amplify sound. The inner ear converts sound waves to messages that are sent to the brain. The inner ear also senses the movement and position of your head and body so you can maintain your balance and see clearly, even when you change positions.  The mastoid bone surrounds the middle ear. The external ear collects sound waves. The ear canal carries sound waves to the eardrum. The eardrum vibrates from sound waves, setting the middle ear bones in motion. The middle ear bones (ossicles) vibrate, transmitting sound waves to the inner ear. When the ear is healthy, air pressure remains balanced in the middle ear. The eustachian tube helps control air pressure in the middle ear. The semicircular canals help maintain balance. The vestibular nerve carries balance signals to the brain. The auditory nerve carries sound signals to the brain. The cochlea picks up sound waves and makes nerve signals.     Date Last Reviewed: 10/1/2016    1691-2128 [employer]. [address] [place] [address]. All rights reserved. This information is not intended as a substitute for professional medical care. Always follow your healthcare professional's instructions.            Follow-ups after your visit        Follow-up notes from your care team     Return in about 4 days (around  "1/23/2018).      Who to contact     If you have questions or need follow up information about today's clinic visit or your schedule please contact [facility] directly at [phone].  Normal or non-critical lab and imaging results will be communicated to you by MyChart, letter or phone within 4 business days after the clinic has received the results. If you do not hear from us within 7 days, please contact the clinic through MyChart or phone. If you have a critical or abnormal lab result, we will notify you by phone as soon as possible.  Submit refill requests through [employer] or call your pharmacy and they will forward the refill request to us. Please allow 3 business days for your refill to be completed.          Additional Information About Your Visit        [employer] Information     [employer] gives you secure access to your electronic health record. If you see a primary care provider, you can also send messages to your care team and make appointments. If you have questions, please call your primary care clinic.  If you do not have a primary care provider, please call [phone] and they will assist you.        Care EveryWhere ID     This is your Care EveryWhere ID. This could be used by other organizations to access your [facility] medical records  [id number]        Your Vitals Were     Pulse Temperature Respirations Height Pulse Oximetry BMI (Body Mass Index)    88 98  F (36.7  C) (Tympanic) 14 5' 5\" (1.651 m) 98% 22.13 kg/m2       Blood Pressure from Last 3 Encounters:   01/19/18 114/72   10/10/17 122/78   09/21/17 104/72    Weight from Last 3 Encounters:   01/19/18 133 lb (60.3 kg)   09/21/17 135 lb (61.2 kg)   06/26/17 135 lb 7 oz (61.4 kg)              Today, you had the following     No orders found for display       Primary Care Provider Office Phone # Fax #    Russell Murali Alcantara [phone] [address] [facility] [address]        Equal Access to Services     " NAZIA [facility]: Hadii aad ku allison Johnson, waaxda luqadaha, qaybta kaalmada adekevin, leoncio brenin hayaatamera stacywilliam murillojean pierre mathews . So [facility] [phone].    ATENCIÓN: Si habla español, tiene a garcias disposición servicios gratuitos de asistencia lingüística. Llame al [phone].    We comply with applicable federal civil rights laws and [place] laws. We do not discriminate on the basis of race, color, national origin, age, disability, sex, sexual orientation, or gender identity.            Thank you!     Thank you for choosing [facility]  for your care. Our goal is always to provide you with excellent care. Hearing back from our patients is one way we can continue to improve our services. Please take a few minutes to complete the written survey that you may receive in the mail after your visit with us. Thank you!             Your Updated Medication List - Protect others around you: Learn how to safely use, store and throw away your medicines at www.disposemymeds.org.          This list is accurate as of: 1/19/18 11:34 AM.  Always use your most recent med list.                   Brand Name Dispense Instructions for use Diagnosis    albuterol 108 (90 BASE) MCG/ACT Inhaler    PROAIR HFA/PROVENTIL HFA/VENTOLIN HFA    3 Inhaler    Inhale 2 puffs into the lungs every 6 hours as needed for shortness of breath / dyspnea or wheezing        aspirin 81 MG tablet      Take by mouth daily        FLONASE 50 MCG/ACT spray   Generic drug:  fluticasone      Spray 1 spray into both nostrils as needed for rhinitis or allergies        fluticasone-salmeterol 500-50 MCG/DOSE diskus inhaler    ADVAIR     Inhale 1 puff into the lungs every 12 hours        OMEGA-3 FISH OIL PO           simvastatin 20 MG tablet    ZOCOR     Take 10 mg by mouth At Bedtime        zolpidem 5 MG tablet    AMBIEN    30 tablet    Take 1 tablet (5 mg) by mouth nightly as needed for sleep    Transient insomnia

## 2018-01-19 NOTE — NURSING NOTE
"Chief Complaint   Patient presents with     Hearing Problem     hearing change-dizziness/vertigo       Initial /72  Pulse 88  Temp 98  F (36.7  C) (Tympanic)  Resp 14  Ht 5' 5\" (1.651 m)  Wt 133 lb (60.3 kg)  SpO2 98%  BMI 22.13 kg/m2 Estimated body mass index is 22.13 kg/(m^2) as calculated from the following:    Height as of this encounter: 5' 5\" (1.651 m).    Weight as of this encounter: 133 lb (60.3 kg).  BP completed using cuff size: regular    Health Maintenance that is potentially due pending provider review:  Health Maintenance Due   Topic Date Due     HEPATITIS C SCREENING  08/19/1964     ADVANCE DIRECTIVE PLANNING Q5 YRS  08/19/2001     PNEUMOCOCCAL (2 of 2 - PPSV23) 10/21/2016     ASTHMA CONTROL TEST Q6 MOS  12/26/2017         PHQ/ACT/EVELYN--Gave pt questionnare and per provider  "

## 2018-01-19 NOTE — PATIENT INSTRUCTIONS
Anatomy of the Ear    The ear is a complex and delicate organ. It collects sound waves so you can hear the world around you. The ear also has a second function--it helps you keep your balance. Your ear can be divided into 3 parts. The outer ear and middle ear help collect and amplify sound. The inner ear converts sound waves to messages that are sent to the brain. The inner ear also senses the movement and position of your head and body so you can maintain your balance and see clearly, even when you change positions.  The mastoid bone surrounds the middle ear. The external ear collects sound waves. The ear canal carries sound waves to the eardrum. The eardrum vibrates from sound waves, setting the middle ear bones in motion. The middle ear bones (ossicles) vibrate, transmitting sound waves to the inner ear. When the ear is healthy, air pressure remains balanced in the middle ear. The eustachian tube helps control air pressure in the middle ear. The semicircular canals help maintain balance. The vestibular nerve carries balance signals to the brain. The auditory nerve carries sound signals to the brain. The cochlea picks up sound waves and makes nerve signals.     Date Last Reviewed: 10/1/2016    7056-1469 [employer]. [address] [place] [address], [place] [address]. All rights reserved. This information is not intended as a substitute for professional medical care. Always follow your healthcare professional's instructions.

## 2018-01-22 ENCOUNTER — OFFICE VISIT (OUTPATIENT)
Dept: AUDIOLOGY | Facility: CLINIC | Age: 73
End: 2018-01-22
Payer: COMMERCIAL

## 2018-01-22 ENCOUNTER — CARE COORDINATION (OUTPATIENT)
Dept: OTOLARYNGOLOGY | Facility: CLINIC | Age: 73
End: 2018-01-22

## 2018-01-22 DIAGNOSIS — H91.20 SUDDEN HEARING LOSS: Primary | ICD-10-CM

## 2018-01-22 DIAGNOSIS — H90.3 SENSORY HEARING LOSS, BILATERAL: Primary | ICD-10-CM

## 2018-01-22 DIAGNOSIS — H91.92 DECREASED HEARING OF LEFT EAR: Primary | ICD-10-CM

## 2018-01-22 DIAGNOSIS — H91.20 SUDDEN HEARING LOSS: ICD-10-CM

## 2018-01-22 RX ORDER — PREDNISONE 10 MG/1
10 TABLET ORAL DAILY
Qty: 74 TABLET | Refills: 0 | Status: SHIPPED | OUTPATIENT
Start: 2018-01-22 | End: 2018-02-14

## 2018-01-22 RX ORDER — PREDNISONE 10 MG/1
10 TABLET ORAL DAILY
Qty: 74 TABLET | Refills: 0 | Status: SHIPPED | OUTPATIENT
Start: 2018-01-22 | End: 2018-01-22

## 2018-01-22 NOTE — MR AVS SNAPSHOT
After Visit Summary   1/22/2018    Bear Guido    MRN: [id number]           Patient Information     Date Of Birth          1945        Visit Information        Provider Department      1/22/2018 10:30 AM María Pichardo AuD M [facility] Audiology        Today's Diagnoses     Sensory hearing loss, bilateral    -  1       Follow-ups after your visit        Your next 10 appointments already scheduled     Feb 14, 2018  8:30 AM CST   Walk In From ENT with Ghassan Plummer [facility] Audiology ([facility])    [address] [place] [id number] [phone]            Feb 14, 2018  9:30 AM CST   (Arrive by 9:15 AM)   RETURN NEUROTOLOGY with MD EDI Roman [facility] Ear Nose and Throat ([facility])    [address] [place] [id number] [phone]              Future tests that were ordered for you today     Open Future Orders        Priority Expected Expires Ordered    Hearing Test, Comprehensive (Audiogram) ([address]) Routine  7/21/2018 1/22/2018            Who to contact     Please call your clinic at [phone] to:    Ask questions about your health    Make or cancel appointments    Discuss your medicines    Learn about your test results    Speak to your doctor   If you have compliments or concerns about an experience at your clinic, or if you wish to file a complaint, please contact [facility] Physicians Patient Relations at [phone] or email us at [email or web address]         Additional Information About Your Visit        MyChart Information     [employer] gives you secure access to your electronic health record. If you see a primary care provider, you can also send messages to your care team and make appointments. If you have questions, please call your primary care clinic.  If you do not have a primary care provider, please call [phone] and they will  assist you.      [employer] is an electronic gateway that provides easy, online access to your medical records. With [employer], you can request a clinic appointment, read your test results, renew a prescription or communicate with your care team.     To access your existing account, please contact your [facility] or call [phone] for assistance.        Care EveryWhere ID     This is your Care EveryWhere ID. This could be used by other organizations to access your [facility] medical records  [id number]         Blood Pressure from Last 3 Encounters:   01/19/18 114/72   10/10/17 122/78   09/21/17 104/72    Weight from Last 3 Encounters:   01/19/18 60.3 kg (133 lb)   09/21/17 61.2 kg (135 lb)   06/26/17 61.4 kg (135 lb 7 oz)              We Performed the Following     AUDIOGRAM/TYMPANOGRAM - INTERFACE     [facility] Audiometry Thrshld Eval & Speech Recog (740[address])     Tymps / Reflex   (09597)          Today's Medication Changes          These changes are accurate as of: 1/22/18  1:12 PM.  If you have any questions, ask your nurse or doctor.               Start taking these medicines.        Dose/Directions    omeprazole 20 MG CR capsule   Commonly known as:  priLOSEC   Used for:  Sudden hearing loss   Started by:  Danielle Spears MD        Dose:  20 mg   Take 1 capsule (20 mg) by mouth daily   Quantity:  30 capsule   Refills:  0       predniSONE 10 MG tablet   Commonly known as:  DELTASONE   Used for:  Sudden hearing loss   Started by:  Danielle Spears MD        Dose:  10 mg   Take 1 tablet (10 mg) by mouth daily 60 mg daily for 10 days, 50 mg times one day, 40 mg times one day, 30 mg times one day, 20 mg daily times one day, 10 mg daily times one day.   Quantity:  74 tablet   Refills:  0            Where to get your medicines      These medications were sent to [place], [place] - [address] [facility] [address] [place] [address] [address] [place] [address]     Hours:  TRANSPLANT PHONE NUMBER [phone] Phone:  [phone]     omeprazole 20 MG CR capsule         Some of these will need a paper prescription and others can be bought over the counter.  Ask your nurse if you have questions.     Bring a paper prescription for each of these medications     predniSONE 10 MG tablet                Primary Care Provider Office Phone # Fax #    Russell Murali Alcantara -[phone] [address] [facility] [address]        Equal Access to Services     NAZIA WHITAKER : Hadii aad ku hadasho Soomaali, waaxda luqadaha, qaybta kaalmada adeegyada, waxay idiin hayaan adewilliam kheladio mathews . So [facility] [phone].    ATENCIÓN: Si habla español, tiene a garcias disposición servicios gratuitos de asistencia lingüística. LlK[facility]tering Health [phone].    We comply with applicable federal civil rights laws and [place] laws. We do not discriminate on the basis of race, color, national origin, age, disability, sex, sexual orientation, or gender identity.            Thank you!     Thank you for choosing [facility] AUDIOLOGY  for your care. Our goal is always to provide you with excellent care. Hearing back from our patients is one way we can continue to improve our services. Please take a few minutes to complete the written survey that you may receive in the mail after your visit with us. Thank you!             Your Updated Medication List - Protect others around you: Learn how to safely use, store and throw away your medicines at www.disposemymeds.org.          This list is accurate as of: 1/22/18  1:12 PM.  Always use your most recent med list.                   Brand Name Dispense Instructions for use Diagnosis    albuterol 108 (90 BASE) MCG/ACT Inhaler    PROAIR HFA/PROVENTIL HFA/VENTOLIN HFA    3 Inhaler    Inhale 2 puffs into the lungs every 6 hours as needed for shortness of breath / dyspnea or wheezing        aspirin 81 MG tablet      Take by mouth daily        FLONASE 50  MCG/ACT spray   Generic drug:  fluticasone      Spray 1 spray into both nostrils as needed for rhinitis or allergies        fluticasone-salmeterol 500-50 MCG/DOSE diskus inhaler    ADVAIR     Inhale 1 puff into the lungs every 12 hours        OMEGA-3 FISH OIL PO           omeprazole 20 MG CR capsule    priLOSEC    30 capsule    Take 1 capsule (20 mg) by mouth daily    Sudden hearing loss       predniSONE 10 MG tablet    DELTASONE    74 tablet    Take 1 tablet (10 mg) by mouth daily 60 mg daily for 10 days, 50 mg times one day, 40 mg times one day, 30 mg times one day, 20 mg daily times one day, 10 mg daily times one day.    Sudden hearing loss       simvastatin 20 MG tablet    ZOCOR     Take 10 mg by mouth At Bedtime        zolpidem 5 MG tablet    AMBIEN    30 tablet    Take 1 tablet (5 mg) by mouth nightly as needed for sleep    Transient insomnia

## 2018-01-22 NOTE — PROGRESS NOTES
Pt had audio done this morning for left sudden hearing loss- Dr. Spears has reviewed the audio done. New rx for prednisone with taper and prilosec. Reviewed basic purpose/side effect for prednisone, pt understood. Pt will f/u in two weeks with  Dr. Phillips- audio prior to appt.  Valeria Abdullahi RN  ENT Care Coordinator   Otology  [phone]  1/22/2018 11:45 AM

## 2018-01-22 NOTE — PROGRESS NOTES
The patient called today with concerns about decreased hearing in his left ear. He states that he is deaf in his right ear and that his hearing decreased on the left side after sylvia the flu recently. He states that he was seen by his PCP last week and they instructed to call us if the hearing was not improved and his it not.  Will plan to obtain an audiogram today and have Dr. Phillips review the audiogram. Will schedule him in clinic based on the audiogram results. The patient voiced an understanding of this information and plan.    Mireya Maurer R.N., B.S.N.  Nurse Coordinator Head & Neck Surgery  [phone]  1/22/2018 8:24 AM

## 2018-01-22 NOTE — PROGRESS NOTES
AUDIOLOGY REPORT    SUBJECTIVE:  Bear Guido is a 72 year old male was seen in Audiology at the [facility] [place] on 1/22/2018 for an emergency addition for audiologic evaluation, referred by Dr. Antoni Phillips  The patient has been seen previously in this clinic on 10/1/2014 for assessment and results indicated profound hearing loss right and normal sloping to severe high frequency sensorineural hearing loss left The patient reports a decrease in left ear hearing noted 4 days prior with accompanying dizziness, tinnitus, and fullness.  The dizziness has since diminished. The patient denies  bilateral otalgia.  The patient notes difficulty with communication in a variety of listening situations.  OBJECTIVE:  Otoscopic exam indicates ears are clear of cerumen bilaterally     Pure Tone Thresholds assessed using conventional audiometry with good  reliability from 250-8000 Hz bilaterally using insert earphones and circumaural headphones     RIGHT:  profound sensorineural hearing loss    LEFT:    moderate-severe sensorineural hearing loss    Tympanogram:    RIGHT: normal eardrum mobility    LEFT:   normal eardrum mobility    Reflexes (reported by stimulus ear):  RIGHT: Ipsilateral is absent at frequencies tested  RIGHT: Contralateral is absent at frequencies tested  LEFT:   Ipsilateral is present at normal levels  LEFT:   Contralateral is absent at frequencies tested      Speech Reception Threshold:    RIGHT: 95 dB HL (vibrotactile detection)    LEFT:   35 dB HL  Word Recognition Score:     RIGHT: DNT    LEFT:   84% at 85 dB HL using NU-6 recorded word list.      ASSESSMENT:   Compared to patient's previous audiogram dated 10/1/2014, hearing has decreased in the left ear. Today s results were discussed with the patient in detail.     PLAN:  Patient was counseled regarding hearing loss and impact on communication.  The patient will be scheduled with Dr. Phililps in 2 weeks.  Please  call this clinic with questions regarding these results or recommendations.      Earnest Zavaleta, CCC-A  Licensed Audiologist  [place] #[id number]

## 2018-01-25 ENCOUNTER — CARE COORDINATION (OUTPATIENT)
Dept: OTOLARYNGOLOGY | Facility: CLINIC | Age: 73
End: 2018-01-25

## 2018-01-25 RX ORDER — ZOLPIDEM TARTRATE 5 MG/1
TABLET ORAL
Qty: 30 TABLET | Refills: 0 | COMMUNITY
Start: 2018-01-25 | End: 2018-02-14

## 2018-01-25 NOTE — PROGRESS NOTES
Pt left message - requesting sleeping medication while on the prednisone ( for sudden hearing loss).  Reviewed with Dr. Spears, Rx sent to pharmacy.  Message with plan left on pt voice mail.  Valeria Abdullahi RN  ENT Care Coordinator   Otology  [phone]  1/25/2018 8:47 AM

## 2018-02-05 DIAGNOSIS — H91.90 HEARING LOSS: Primary | ICD-10-CM

## 2018-02-14 ENCOUNTER — OFFICE VISIT (OUTPATIENT)
Dept: AUDIOLOGY | Facility: CLINIC | Age: 73
End: 2018-02-14
Payer: COMMERCIAL

## 2018-02-14 ENCOUNTER — OFFICE VISIT (OUTPATIENT)
Dept: OTOLARYNGOLOGY | Facility: CLINIC | Age: 73
End: 2018-02-14
Payer: COMMERCIAL

## 2018-02-14 VITALS — HEIGHT: 64 IN | BODY MASS INDEX: 22.36 KG/M2 | WEIGHT: 131 LBS

## 2018-02-14 DIAGNOSIS — H91.22 SUDDEN LEFT HEARING LOSS: ICD-10-CM

## 2018-02-14 DIAGNOSIS — H90.3 SENSORY HEARING LOSS, BILATERAL: Primary | ICD-10-CM

## 2018-02-14 RX ORDER — PREDNISONE 10 MG/1
10 TABLET ORAL DAILY
Qty: 74 TABLET | Refills: 0 | Status: SHIPPED | OUTPATIENT
Start: 2018-02-14 | End: 2018-03-07

## 2018-02-14 ASSESSMENT — PAIN SCALES - GENERAL: PAINLEVEL: NO PAIN (0)

## 2018-02-14 NOTE — PROGRESS NOTES
History of Present Illness:  This is a 72-year-old man who is seen because of sudden hearing loss in his left ear. This is his only hearing ear and he was treated with steroids. The patient reports that there is a boom quality to his hearing aid at this time. He feels that he is doing better with the left ear, but still has not achieved the same hearing that he had before the event. 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    Physical Exam: Exam is unremarkable. The tympanic membranes are intact. There is no evidence of nystagmus. His facial nerve is a House Brackmann class I.     Audiogram: The audiogram shows that his low frequencies including those at 1 kHz and lower have returned to nearly normal levels. Unfortunately, his hearing threshold at 2 kHz and higher has not returned and it is in this midrange that he has difficulty because of the loss.    His word recognition score is excellent in the only remaining here.    Impression: This is a suboptimal improvement in his hearing since it did not return to previous thresholds. He did receive improvement and has a functioning only hearing left ear.     Plan:  We talked about the risks, benefits and alternatives that still exist. He may benefit from a bone anchored hearing aid on the dead right ear. I spent some time going over a diagnosis. His concern was that he was diagnosed with otosclerosis and questions whether this is in fact correct. A post op CT scan was never really obtained. If we were to do the scan, I am not sure that we would be able to do anything different. We talked about long-term management and he will return on a regular basis for audiometric evaluation. We talked about seeing his audiologist for adjustment of his hearing aid.      SL/ms

## 2018-02-14 NOTE — PATIENT INSTRUCTIONS
Please call our clinic for any questions,concerns,or worsening symptoms.      Clinic #[phone]       Option 3  for the triage nurse.

## 2018-02-14 NOTE — MR AVS SNAPSHOT
After Visit Summary   2/14/2018    Bear Guido    MRN: [id number]           Patient Information     Date Of Birth          1945        Visit Information        Provider Department      2/14/2018 8:30 AM Hortensia Shah, Ghassan DALEY [facility] Audiology        Today's Diagnoses     Sensory hearing loss, bilateral    -  1       Follow-ups after your visit        Who to contact     Please call your clinic at [phone] to:    Ask questions about your health    Make or cancel appointments    Discuss your medicines    Learn about your test results    Speak to your doctor            Additional Information About Your Visit        [employer] Information     [employer] gives you secure access to your electronic health record. If you see a primary care provider, you can also send messages to your care team and make appointments. If you have questions, please call your primary care clinic.  If you do not have a primary care provider, please call [phone] and they will assist you.      [employer] is an electronic gateway that provides easy, online access to your medical records. With [employer], you can request a clinic appointment, read your test results, renew a prescription or communicate with your care team.     To access your existing account, please contact your [facility] or call [phone] for assistance.        Care EveryWhere ID     This is your Care EveryWhere ID. This could be used by other organizations to access your [facility] medical records  [id number]         Blood Pressure from Last 3 Encounters:   01/19/18 114/72   10/10/17 122/78   09/21/17 104/72    Weight from Last 3 Encounters:   02/14/18 59.4 kg (131 lb)   01/19/18 60.3 kg (133 lb)   09/21/17 61.2 kg (135 lb)              We Performed the Following     AUDIOGRAM/TYMPANOGRAM - INTERFACE     Reduced 52 - Cmprhn Audiometry Thrshld Eval & Speech Recog   (39710)     Reduced 52 - Tymps / Reflex   (66535)          Where to  get your medicines      Some of these will need a paper prescription and others can be bought over the counter.  Ask your nurse if you have questions.     Bring a paper prescription for each of these medications     predniSONE 10 MG tablet          Primary Care Provider Office Phone # Fax #    Russell Murali Alcantara [phone] [address] [facility] [address]        Equal Access to Services     [facility] : Hadii aad ku hadasho Soomaali, waaxda luqadaha, qaybta kaalmada adeegyada, waxay idiin hayaan adeeg khswapnash la'aan . So [facility] [phone].    ATENCIÓN: Si habla espjen, tiene a garcias disposición servicios gratuitos de asistencia lingüística. Makayla al [phone].    We comply with applicable federal civil rights laws and [place] laws. We do not discriminate on the basis of race, color, national origin, age, disability, sex, sexual orientation, or gender identity.            Thank you!     Thank you for choosing [facility] AUDIOLOGY  for your care. Our goal is always to provide you with excellent care. Hearing back from our patients is one way we can continue to improve our services. Please take a few minutes to complete the written survey that you may receive in the mail after your visit with us. Thank you!             Your Updated Medication List - Protect others around you: Learn how to safely use, store and throw away your medicines at www.disposemymeds.org.          This list is accurate as of 2/14/18 12:07 PM.  Always use your most recent med list.                   Brand Name Dispense Instructions for use Diagnosis    albuterol 108 (90 BASE) MCG/ACT Inhaler    PROAIR HFA/PROVENTIL HFA/VENTOLIN HFA    3 Inhaler    Inhale 2 puffs into the lungs every 6 hours as needed for shortness of breath / dyspnea or wheezing        aspirin 81 MG tablet      Take by mouth daily        FLONASE 50 MCG/ACT spray   Generic drug:  fluticasone      Spray 1 spray into both nostrils as needed  for rhinitis or allergies        fluticasone-salmeterol 500-50 MCG/DOSE diskus inhaler    ADVAIR     Inhale 1 puff into the lungs every 12 hours        OMEGA-3 FISH OIL PO           omeprazole 20 MG CR capsule    priLOSEC    30 capsule    Take 1 capsule (20 mg) by mouth daily    Sudden hearing loss       predniSONE 10 MG tablet    DELTASONE    74 tablet    Take 1 tablet (10 mg) by mouth daily 60 mg daily for 10 days, 50 mg times one day, 40 mg times one day, 30 mg times one day, 20 mg daily times one day, 10 mg daily times one day.    Sudden hearing loss       simvastatin 20 MG tablet    ZOCOR     Take 10 mg by mouth At Bedtime        zolpidem 5 MG tablet    AMBIEN    30 tablet    Take 1 tablet (5 mg) by mouth nightly as needed for sleep    Transient insomnia

## 2018-02-14 NOTE — PROGRESS NOTES
AUDIOLOGY REPORT    SUMMARY: Audiology visit completed. See audiogram for results.      RECOMMENDATIONS: Follow-up with ENT.    Sammy Chahal.  Licensed Audiologist  [place] #[id number]

## 2018-02-14 NOTE — LETTER
2/14/2018       RE: Bear Guiod  [address] [facility] [address]     Dear Colleague,    Thank you for referring your patient, Bear Guido, to the [facility] EAR NOSE AND THROAT at [facility]. Please see a copy of my visit note below.    History of Present Illness:  This is a 72-year-old man who is seen because of sudden hearing loss in his left ear. This is his only hearing ear and he was treated with steroids. The patient reports that there is a boom quality to his hearing aid at this time. He feels that he is doing better with the left ear, but still has not achieved the same hearing that he had before the event. 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    Physical Exam: Exam is unremarkable. The tympanic membranes are intact. There is no evidence of nystagmus. His facial nerve is a House Brackmann class I.     Audiogram: The audiogram shows that his low frequencies including those at 1 kHz and lower have returned to nearly normal levels. Unfortunately, his hearing threshold at 2 kHz and higher has not returned and it is in this midrange that he has difficulty because of the loss.    His word recognition score is excellent in the only remaining here.    Impression: This is a suboptimal improvement in his hearing since it did not return to previous thresholds. He did receive improvement and has a functioning only hearing left ear.     Plan:  We talked about the risks, benefits and alternatives that still exist. He may benefit from a bone anchored hearing aid on the dead right ear. I spent some time going over a diagnosis. His concern was that he was diagnosed with otosclerosis and questions whether this is in fact correct. A post op CT scan was never really obtained. If we were to do the scan, I am not sure that we would be able to do anything different. We talked  about long-term management and he will return on a regular basis for audiometric evaluation. We talked about seeing his audiologist for adjustment of his hearing aid.      Again, thank you for allowing me to participate in the care of your patient.      Sincerely,    Antoni Phillips MD

## 2018-02-14 NOTE — LETTER
2/14/2018       RE: Bear Guido  [address] [facility] [address]     Dear Colleague,    Thank you for referring your patient, Bear Guido, to the [facility] EAR NOSE AND THROAT at [facility]. Please see a copy of my visit note below.    History of Present Illness:  This is a 72-year-old man who is seen because of sudden hearing loss in his left ear. This is his only hearing ear and he was treated with steroids. The patient reports that there is a boom quality to his hearing aid at this time. He feels that he is doing better with the left ear, but still has not achieved the same hearing that he had before the event. 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    Physical Exam: Exam is unremarkable. The tympanic membranes are intact. There is no evidence of nystagmus. His facial nerve is a House Brackmann class I.     Audiogram: The audiogram shows that his low frequencies including those at 1 kHz and lower have returned to nearly normal levels. Unfortunately, his hearing threshold at 2 kHz and higher has not returned and it is in this midrange that he has difficulty because of the loss.    His word recognition score is excellent in the only remaining here.    Impression: This is a suboptimal improvement in his hearing since it did not return to previous thresholds. He did receive improvement and has a functioning only hearing left ear.     Plan:  We talked about the risks, benefits and alternatives that still exist. He may benefit from a bone anchored hearing aid on the dead right ear. I spent some time going over a diagnosis. His concern was that he was diagnosed with otosclerosis and questions whether this is in fact correct. A post op CT scan was never really obtained. If we were to do the scan, I am not sure that we would be able to do anything different. We talked  about long-term management and he will return on a regular basis for audiometric evaluation. We talked about seeing his audiologist for adjustment of his hearing aid.      Again, thank you for allowing me to participate in the care of your patient.      Sincerely,    Antoni Phillips MD

## 2018-02-14 NOTE — MR AVS SNAPSHOT
"              After Visit Summary   2/14/2018    Bear Guido    MRN: [id number]           Patient Information     Date Of Birth          1945        Visit Information        Provider Department      2/14/2018 9:30 AM Antoni Phillips MD [facility] Ear Nose and Throat        Today's Diagnoses     Sudden left hearing loss          Care Instructions         Please call our clinic for any questions,concerns,or worsening symptoms.      Clinic #[phone]       Option 3  for the triage nurse.          Follow-ups after your visit        Who to contact     Please call your clinic at [phone] to:    Ask questions about your health    Make or cancel appointments    Discuss your medicines    Learn about your test results    Speak to your doctor            Additional Information About Your Visit        [employer] Information     [employer] gives you secure access to your electronic health record. If you see a primary care provider, you can also send messages to your care team and make appointments. If you have questions, please call your primary care clinic.  If you do not have a primary care provider, please call [phone] and they will assist you.      [employer] is an electronic gateway that provides easy, online access to your medical records. With [employer], you can request a clinic appointment, read your test results, renew a prescription or communicate with your care team.     To access your existing account, please contact your [facility] or call [phone] for assistance.        Care EveryWhere ID     This is your Care EveryWhere ID. This could be used by other organizations to access your [place] medical records  [id number]        Your Vitals Were     Height BMI (Body Mass Index)                1.626 m (5' 4\") 22.49 kg/m2           Blood Pressure from Last 3 Encounters:   01/19/18 114/72   10/10/17 122/78   09/21/17 104/72    Weight from Last 3 Encounters:   02/14/18 59.4 kg (131 " lb)   01/19/18 60.3 kg (133 lb)   09/21/17 61.2 kg (135 lb)              Today, you had the following     No orders found for display         Where to get your medicines      Some of these will need a paper prescription and others can be bought over the counter.  Ask your nurse if you have questions.     Bring a paper prescription for each of these medications     predniSONE 10 MG tablet          Primary Care Provider Office Phone # Fax #    Russell Murali Alcantara -[phone] [address] [facility] [address]        Equal Access to Services     [facility]: Hadii aad ku hadasho Somargarito, waaxda luqadaha, qaybta kaalmada cady, leoncio mathews . So [facility] [phone].    ATENCIÓN: Si habla español, tiene a garcias disposición servicios gratuitos de asistencia lingüística. [facility] [phone].    We comply with applicable federal civil rights laws and [place] laws. We do not discriminate on the basis of race, color, national origin, age, disability, sex, sexual orientation, or gender identity.            Thank you!     Thank you for choosing [facility] EAR NOSE AND THROAT  for your care. Our goal is always to provide you with excellent care. Hearing back from our patients is one way we can continue to improve our services. Please take a few minutes to complete the written survey that you may receive in the mail after your visit with us. Thank you!             Your Updated Medication List - Protect others around you: Learn how to safely use, store and throw away your medicines at www.disposemymeds.org.          This list is accurate as of 2/14/18 11:59 PM.  Always use your most recent med list.                   Brand Name Dispense Instructions for use Diagnosis    albuterol 108 (90 BASE) MCG/ACT Inhaler    PROAIR HFA/PROVENTIL HFA/VENTOLIN HFA    3 Inhaler    Inhale 2 puffs into the lungs every 6 hours as needed for shortness of breath / dyspnea or wheezing         aspirin 81 MG tablet      Take by mouth daily        FLONASE 50 MCG/ACT spray   Generic drug:  fluticasone      Spray 1 spray into both nostrils as needed for rhinitis or allergies        fluticasone-salmeterol 500-50 MCG/DOSE diskus inhaler    ADVAIR     Inhale 1 puff into the lungs every 12 hours        OMEGA-3 FISH OIL PO           omeprazole 20 MG CR capsule    priLOSEC    30 capsule    Take 1 capsule (20 mg) by mouth daily    Sudden hearing loss       predniSONE 10 MG tablet    DELTASONE    74 tablet    Take 1 tablet (10 mg) by mouth daily 60 mg daily for 10 days, 50 mg times one day, 40 mg times one day, 30 mg times one day, 20 mg daily times one day, 10 mg daily times one day.    Sudden left hearing loss       simvastatin 20 MG tablet    ZOCOR     Take 10 mg by mouth At Bedtime        zolpidem 5 MG tablet    AMBIEN    30 tablet    Take 1 tablet (5 mg) by mouth nightly as needed for sleep    Transient insomnia

## 2018-02-14 NOTE — LETTER
2/14/2018      RE: Bear Guido  [address] [facility] [address]         History of Present Illness:  This is a 72-year-old man who is seen because of sudden hearing loss in his left ear. This is his only hearing ear and he was treated with steroids. The patient reports that there is a boom quality to his hearing aid at this time. He feels that he is doing better with the left ear, but still has not achieved the same hearing that he had before the event. 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    Physical Exam: Exam is unremarkable. The tympanic membranes are intact. There is no evidence of nystagmus. His facial nerve is a House Brackmann class I.     Audiogram: The audiogram shows that his low frequencies including those at 1 kHz and lower have returned to nearly normal levels. Unfortunately, his hearing threshold at 2 kHz and higher has not returned and it is in this midrange that he has difficulty because of the loss.    His word recognition score is excellent in the only remaining here.    Impression: This is a suboptimal improvement in his hearing since it did not return to previous thresholds. He did receive improvement and has a functioning only hearing left ear.     Plan:  We talked about the risks, benefits and alternatives that still exist. He may benefit from a bone anchored hearing aid on the dead right ear. I spent some time going over a diagnosis. His concern was that he was diagnosed with otosclerosis and questions whether this is in fact correct. A post op CT scan was never really obtained. If we were to do the scan, I am not sure that we would be able to do anything different. We talked about long-term management and he will return on a regular basis for audiometric evaluation. We talked about seeing his audiologist for adjustment of his hearing aid.      DIONICIO/ms Antoni Phillips,  MD

## 2018-02-14 NOTE — NURSING NOTE
"Chief Complaint   Patient presents with     RECHECK     Consultation Left ear suden hearing loss one month ago     Height 1.626 m (5' 4\"), weight 59.4 kg (131 lb).    Simba Starks    "

## 2018-03-07 ENCOUNTER — OFFICE VISIT (OUTPATIENT)
Dept: FAMILY MEDICINE | Facility: CLINIC | Age: 73
End: 2018-03-07
Payer: COMMERCIAL

## 2018-03-07 VITALS
RESPIRATION RATE: 18 BRPM | DIASTOLIC BLOOD PRESSURE: 76 MMHG | SYSTOLIC BLOOD PRESSURE: 136 MMHG | TEMPERATURE: 97.2 F | HEART RATE: 106 BPM | OXYGEN SATURATION: 98 %

## 2018-03-07 DIAGNOSIS — Z71.84 TRAVEL ADVICE ENCOUNTER: Primary | ICD-10-CM

## 2018-03-07 DIAGNOSIS — Z23 NEED FOR VACCINATION: ICD-10-CM

## 2018-03-07 PROCEDURE — 99402 PREV MED CNSL INDIV APPRX 30: CPT | Mod: GA | Performed by: NURSE PRACTITIONER

## 2018-03-07 RX ORDER — AZITHROMYCIN 500 MG/1
500 TABLET, FILM COATED ORAL DAILY
Qty: 3 TABLET | Refills: 0 | Status: SHIPPED | OUTPATIENT
Start: 2018-03-07 | End: 2018-03-10

## 2018-03-07 RX ORDER — ZOLPIDEM TARTRATE 5 MG/1
5 TABLET ORAL
Qty: 15 TABLET | Refills: 0 | Status: SHIPPED | OUTPATIENT
Start: 2018-03-07 | End: 2018-12-17

## 2018-03-07 RX ORDER — ATOVAQUONE AND PROGUANIL HYDROCHLORIDE 250; 100 MG/1; MG/1
1 TABLET, FILM COATED ORAL DAILY
Qty: 30 TABLET | Refills: 0 | Status: SHIPPED | OUTPATIENT
Start: 2018-03-07 | End: 2020-07-19

## 2018-03-07 NOTE — PATIENT INSTRUCTIONS
Today March 7, 2018 you received the    NO vaccines today.    These appointments can be made as a NURSE ONLY visit.    **It is very important for the vaccinations to be given on the scheduled day(s), this helps ensure you receive the full effectiveness of the vaccine.**    Please call [facility] with any questions [phone]    Thank you for visiting [facility]

## 2018-03-07 NOTE — PROGRESS NOTES
Nurse Note      Itinerary:  [facility]       Departure Date: 03/26/2018      Return Date: 04/16/2018      Length of Trip 3 weeks       Reason for Travel: Tourism           Urban or rural: both      Accommodations: Hotel        IMMUNIZATION HISTORY  Have you received any immunizations within the past 4 weeks?  No  Have you ever fainted from having your blood drawn or from an injection?  No  Have you ever had a fever reaction to vaccination?  No  Have you ever had any bad reaction or side effect from any vaccination?  No  Have you ever had hepatitis A or B vaccine?  Yes  Do you live (or work closely) with anyone who has AIDS, an AIDS-like condition, any other immune disorder or who is on chemotherapy for cancer?  No  Do you have a family history of immunodeficiency?  No  Have you received any injection of immune globulin or any blood products during the past 12 months?  No    Patient roomed by GORGE Nielsen is a 72 year old male seen today alone for counsultation for international travel to [facility] for Tourism.  Patient will be departing in  3 week(s) and staying for   3 week(s) and  traveling with spouse.      Patient itinerary :  will be in the [facility] of [facility] which presents risk for Malaria, Dengue Fever, Chikungungya, Zika, Schistosomiasis, Japanese Encephalitis, Rabies, food borne illnesses, motor vehicle accidents and Typhoid. exposure.      Patient's activities will include sightseeing and camel ride and wedding.    Patient's country of birth is [place]    Special medical concerns: asthma and allergies  Pre-travel questionnaire was completed by patient and reviewed by provider.     Vitals: /76  Pulse 106  Temp 97.2  F (36.2  C) (Oral)  Resp 18  SpO2 98%  BMI= There is no height or weight on file to calculate BMI.    EXAM:  General:  Well-nourished, well-developed in no acute distress.  Appears to be stated age, interacts appropriately and expresses understanding of  information given to patient.    Current Outpatient Prescriptions   Medication Sig Dispense Refill     omeprazole (PRILOSEC) 20 MG CR capsule Take 1 capsule (20 mg) by mouth daily 30 capsule 0     zolpidem (AMBIEN) 5 MG tablet Take 1 tablet (5 mg) by mouth nightly as needed for sleep 30 tablet 1     simvastatin (ZOCOR) 20 MG tablet Take 10 mg by mouth At Bedtime       fluticasone (FLONASE) 50 MCG/ACT spray Spray 1 spray into both nostrils as needed for rhinitis or allergies       albuterol (PROAIR HFA, PROVENTIL HFA, VENTOLIN HFA) 108 (90 BASE) MCG/ACT inhaler Inhale 2 puffs into the lungs every 6 hours as needed for shortness of breath / dyspnea or wheezing 3 Inhaler 1     fluticasone-salmeterol (ADVAIR) 500-50 MCG/DOSE diskus inhaler Inhale 1 puff into the lungs every 12 hours       aspirin 81 MG tablet Take by mouth daily       Omega-3 Fatty Acids (OMEGA-3 FISH OIL PO)        Patient Active Problem List   Diagnosis     Hyperlipidemia with target LDL less than 130     Moderate persistent asthma     Allergic rhinitis     Traumatic myositis ossificans     Vitreous degeneration     History of hematuria     Hyperglycemia     Transient insomnia     Tachycardia     Chest pain     Allergies   Allergen Reactions     Cyclobenzaprine Unknown     Flushing     Seasonal Allergies      Spring and fall allergies         Immunizations discussed include:   Hepatitis A:  Up to date  Hepatitis B: Up to date  Influenza: Up to date  Typhoid: Up to date  Rabies: Declined  reviewed managment of a animal bite or scratch (washing wound, seek medical care within 24 hours for post exposure prophylaxis )  Yellow Fever: Not indicated  Japanese Encephalitis: Not indicated - risk of disease is minimal off season  Meningococcus: Not indicated  Tetanus/Diphtheria: Up to date  Measles/Mumps/Rubella: Immune by disease history per patient report  Cholera: Not needed  Polio: Up to date  Pneumococcal: Up to date  Varicella: Immune by disease history  per patient report  Zostavax:  Up to date  HPV:  Not indicated  TB:  Low risk    Altitude Exposure on this trip: no  Past tolerance to Altitude: na    ASSESSMENT/PLAN:    ICD-10-CM    1. Travel advice encounter Z71.89 atovaquone-proguanil (MALARONE) 250-100 MG per tablet     azithromycin (ZITHROMAX) 500 MG tablet     zolpidem (AMBIEN) 5 MG tablet   2. Need for vaccination Z23      I have reviewed general recommendations for safe travel   including: food/water precautions, insect precautions, safer sex   practices given high prevalence of Zika, HIV and other STDs,   roadway safety. Educational materials and Travax report provided.    Malaraia prophylaxis recommended: Malarone  Symptomatic treatment for traveler's diarrhea: azithromycin  Altitude illness prevention and treatment: none      Evacuation insurance advised and resources were provided to patient.    Total visit time 30 minutes  with over 50% of time spent counseling patient as detailed above.    Mariel Harley CNP

## 2018-03-07 NOTE — MR AVS SNAPSHOT
After Visit Summary   3/7/2018    Bear Guido    MRN: [id number]           Patient Information     Date Of Birth          1945        Visit Information        Provider Department      3/7/2018 8:45 AM Mariel Harley APRN CNP [facility]        Today's Diagnoses     Travel advice encounter    -  1    Need for vaccination          Care Instructions    Today March 7, 2018 you received the    NO vaccines today.    These appointments can be made as a NURSE ONLY visit.    **It is very important for the vaccinations to be given on the scheduled day(s), this helps ensure you receive the full effectiveness of the vaccine.**    Please call [facility] with any questions [phone]    Thank you for visiting [facility]              Follow-ups after your visit        Who to contact     If you have questions or need follow up information about today's clinic visit or your schedule please contact [facility] directly at [phone].  Normal or non-critical lab and imaging results will be communicated to you by Codotahart, letter or phone within 4 business days after the clinic has received the results. If you do not hear from us within 7 days, please contact the clinic through Codotahart or phone. If you have a critical or abnormal lab result, we will notify you by phone as soon as possible.  Submit refill requests through Turning Art or call your pharmacy and they will forward the refill request to us. Please allow 3 business days for your refill to be completed.          Additional Information About Your Visit        MyChart Information     Turning Art gives you secure access to your electronic health record. If you see a primary care provider, you can also send messages to your care team and make appointments. If you have questions, please call your primary care clinic.  If you do not have a primary care provider, please call [phone] and they will  assist you.        Care EveryWhere ID     This is your Care EveryWhere ID. This could be used by other organizations to access your [facility] medical records  [id number]        Your Vitals Were     Pulse Temperature Respirations Pulse Oximetry          106 97.2  F (36.2  C) (Oral) 18 98%         Blood Pressure from Last 3 Encounters:   03/07/18 136/76   01/19/18 114/72   10/10/17 122/78    Weight from Last 3 Encounters:   02/14/18 131 lb (59.4 kg)   01/19/18 133 lb (60.3 kg)   09/21/17 135 lb (61.2 kg)              Today, you had the following     No orders found for display         Today's Medication Changes          These changes are accurate as of 3/7/18 11:59 PM.  If you have any questions, ask your nurse or doctor.               Start taking these medicines.        Dose/Directions    atovaquone-proguanil 250-100 MG per tablet   Commonly known as:  MALARONE   Used for:  Travel advice encounter   Started by:  Mariel Harley APRN CNP        Dose:  1 tablet   Take 1 tablet by mouth daily Start 2 days before exposure to Malaria and continue daily till  7 days after exposure.   Quantity:  30 tablet   Refills:  0       azithromycin 500 MG tablet   Commonly known as:  ZITHROMAX   Used for:  Travel advice encounter   Started by:  Mariel Harley APRN CNP        Dose:  500 mg   Take 1 tablet (500 mg) by mouth daily for 3 doses Take 1 tablet a day for up to 3 days for severe diarrhea   Quantity:  3 tablet   Refills:  0         These medicines have changed or have updated prescriptions.        Dose/Directions    * zolpidem 5 MG tablet   Commonly known as:  AMBIEN   This may have changed:  Another medication with the same name was added. Make sure you understand how and when to take each.   Used for:  Transient insomnia   Changed by:  Mariel Harley APRN CNP        Dose:  5 mg   Take 1 tablet (5 mg) by mouth nightly as needed for sleep   Quantity:  30 tablet   Refills:  1       * zolpidem 5 MG tablet   Commonly  known as:  KIMBERLY   This may have changed:  You were already taking a medication with the same name, and this prescription was added. Make sure you understand how and when to take each.   Used for:  Travel advice encounter   Changed by:  Mariel Harley APRN CNP        Dose:  5 mg   Take 1 tablet (5 mg) by mouth nightly as needed for sleep   Quantity:  15 tablet   Refills:  0       * Notice:  This list has 2 medication(s) that are the same as other medications prescribed for you. Read the directions carefully, and ask your doctor or other care provider to review them with you.         Where to get your medicines      These medications were sent to [facility] - [place], [place] - [address] [place] [address]  [address] [facility] [address], [place] [address]     Phone:  [phone]     atovaquone-proguanil 250-100 MG per tablet    azithromycin 500 MG tablet         Some of these will need a paper prescription and others can be bought over the counter.  Ask your nurse if you have questions.     Bring a paper prescription for each of these medications     zolpidem 5 MG tablet                Primary Care Provider Office Phone # Fax #    Russell Murali Alcantara [phone] [address] [facility] [address]        Equal Access to Services     NAZIA WHITAKER AH: Hadii aad ku hadasho Soomaali, waaxda luqadaha, qaybta kaalmada adeegyada, waxay idiin haybob morales. So [facility] [phone].    ATENCIÓN: Si habla español, tiene a garcias disposición servicios gratuitos de asistencia lingüística. Llame al [phone].    We comply with applicable federal civil rights laws and [place] laws. We do not discriminate on the basis of race, color, national origin, age, disability, sex, sexual orientation, or gender identity.            Thank you!     Thank you for choosing [facility]  for your care. Our goal is always to provide you with excellent care. Hearing back  from our patients is one way we can continue to improve our services. Please take a few minutes to complete the written survey that you may receive in the mail after your visit with us. Thank you!             Your Updated Medication List - Protect others around you: Learn how to safely use, store and throw away your medicines at www.disposemymeds.org.          This list is accurate as of 3/7/18 11:59 PM.  Always use your most recent med list.                   Brand Name Dispense Instructions for use Diagnosis    albuterol 108 (90 BASE) MCG/ACT Inhaler    PROAIR HFA/PROVENTIL HFA/VENTOLIN HFA    3 Inhaler    Inhale 2 puffs into the lungs every 6 hours as needed for shortness of breath / dyspnea or wheezing        aspirin 81 MG tablet      Take by mouth daily        atovaquone-proguanil 250-100 MG per tablet    MALARONE    30 tablet    Take 1 tablet by mouth daily Start 2 days before exposure to Malaria and continue daily till  7 days after exposure.    Travel advice encounter       azithromycin 500 MG tablet    ZITHROMAX    3 tablet    Take 1 tablet (500 mg) by mouth daily for 3 doses Take 1 tablet a day for up to 3 days for severe diarrhea    Travel advice encounter       FLONASE 50 MCG/ACT spray   Generic drug:  fluticasone      Spray 1 spray into both nostrils as needed for rhinitis or allergies        fluticasone-salmeterol 500-50 MCG/DOSE diskus inhaler    ADVAIR     Inhale 1 puff into the lungs every 12 hours        OMEGA-3 FISH OIL PO           omeprazole 20 MG CR capsule    priLOSEC    30 capsule    Take 1 capsule (20 mg) by mouth daily    Sudden hearing loss       simvastatin 20 MG tablet    ZOCOR     Take 10 mg by mouth At Bedtime        * zolpidem 5 MG tablet    AMBIEN    30 tablet    Take 1 tablet (5 mg) by mouth nightly as needed for sleep    Transient insomnia       * zolpidem 5 MG tablet    AMBIEN    15 tablet    Take 1 tablet (5 mg) by mouth nightly as needed for sleep    Travel advice encounter        * Notice:  This list has 2 medication(s) that are the same as other medications prescribed for you. Read the directions carefully, and ask your doctor or other care provider to review them with you.

## 2018-03-15 ENCOUNTER — OFFICE VISIT (OUTPATIENT)
Dept: FAMILY MEDICINE | Facility: CLINIC | Age: 73
End: 2018-03-15
Payer: COMMERCIAL

## 2018-03-15 VITALS
OXYGEN SATURATION: 95 % | TEMPERATURE: 96.8 F | BODY MASS INDEX: 23.39 KG/M2 | HEART RATE: 74 BPM | SYSTOLIC BLOOD PRESSURE: 129 MMHG | HEIGHT: 64 IN | WEIGHT: 137 LBS | DIASTOLIC BLOOD PRESSURE: 77 MMHG

## 2018-03-15 DIAGNOSIS — L72.3 SEBACEOUS CYST: Primary | ICD-10-CM

## 2018-03-15 PROCEDURE — 99213 OFFICE O/P EST LOW 20 MIN: CPT | Performed by: FAMILY MEDICINE

## 2018-03-15 NOTE — NURSING NOTE
"Chief Complaint   Patient presents with     Mass       Initial /77  Pulse 74  Temp 96.8  F (36  C) (Tympanic)  Ht 5' 4\" (1.626 m)  Wt 137 lb (62.1 kg)  SpO2 95%  BMI 23.52 kg/m2 Estimated body mass index is 23.52 kg/(m^2) as calculated from the following:    Height as of this encounter: 5' 4\" (1.626 m).    Weight as of this encounter: 137 lb (62.1 kg).  Medication Reconciliation: complete      Health Maintenance that is potentially due pending provider review:  ACT    Completing ACT today.    RUBENS Zuñiga  "

## 2018-03-15 NOTE — MR AVS SNAPSHOT
"              After Visit Summary   3/15/2018    Bear Guido    MRN: [id number]           Patient Information     Date Of Birth          1945        Visit Information        Provider Department      3/15/2018 10:00 AM Russell Alcantara MD [facility]        Today's Diagnoses     Sebaceous cyst    -  1       Follow-ups after your visit        Who to contact     If you have questions or need follow up information about today's clinic visit or your schedule please contact [facility] directly at [phone].  Normal or non-critical lab and imaging results will be communicated to you by UFOstart AGhart, letter or phone within 4 business days after the clinic has received the results. If you do not hear from us within 7 days, please contact the clinic through Goodzert or phone. If you have a critical or abnormal lab result, we will notify you by phone as soon as possible.  Submit refill requests through Revalesio or call your pharmacy and they will forward the refill request to us. Please allow 3 business days for your refill to be completed.          Additional Information About Your Visit        MyChart Information     Revalesio gives you secure access to your electronic health record. If you see a primary care provider, you can also send messages to your care team and make appointments. If you have questions, please call your primary care clinic.  If you do not have a primary care provider, please call [phone] and they will assist you.        Care EveryWhere ID     This is your Care EveryWhere ID. This could be used by other organizations to access your [facility] medical records  [id number]        Your Vitals Were     Pulse Temperature Height Pulse Oximetry BMI (Body Mass Index)       74 96.8  F (36  C) (Tympanic) 5' 4\" (1.626 m) 95% 23.52 kg/m2        Blood Pressure from Last 3 Encounters:   03/15/18 129/77   03/07/18 136/76   01/19/18 114/72    Weight from Last 3 Encounters:   03/15/18 " 137 lb (62.1 kg)   02/14/18 131 lb (59.4 kg)   01/19/18 133 lb (60.3 kg)              Today, you had the following     No orders found for display       Primary Care Provider Office Phone # Fax #    Russell Murali Alcantara [phone] [address] [facility] [address]        Equal Access to Services     SG WHITAKER : Hadii aad ku hadasho Soomaali, waaxda luqadaha, qaybta kaalmada adeegyada, waxay idiin hayaan adeeg khswapnash la'aan . So [facility] [phone].    ATENCIÓN: Si habla espjen, tiene a garcias disposición servicios gratuitos de asistencia lingüística. Llame al [phone].    We comply with applicable federal civil rights laws and [place] laws. We do not discriminate on the basis of race, color, national origin, age, disability, sex, sexual orientation, or gender identity.            Thank you!     Thank you for choosing [facility]  for your care. Our goal is always to provide you with excellent care. Hearing back from our patients is one way we can continue to improve our services. Please take a few minutes to complete the written survey that you may receive in the mail after your visit with us. Thank you!             Your Updated Medication List - Protect others around you: Learn how to safely use, store and throw away your medicines at www.disposemymeds.org.          This list is accurate as of 3/15/18 10:23 AM.  Always use your most recent med list.                   Brand Name Dispense Instructions for use Diagnosis    albuterol 108 (90 BASE) MCG/ACT Inhaler    PROAIR HFA/PROVENTIL HFA/VENTOLIN HFA    3 Inhaler    Inhale 2 puffs into the lungs every 6 hours as needed for shortness of breath / dyspnea or wheezing        aspirin 81 MG tablet      Take by mouth daily        atovaquone-proguanil 250-100 MG per tablet    MALARONE    30 tablet    Take 1 tablet by mouth daily Start 2 days before exposure to Malaria and continue daily till  7 days after exposure.     Travel advice encounter       FLONASE 50 MCG/ACT spray   Generic drug:  fluticasone      Spray 1 spray into both nostrils as needed for rhinitis or allergies        fluticasone-salmeterol 500-50 MCG/DOSE diskus inhaler    ADVAIR     Inhale 1 puff into the lungs every 12 hours        OMEGA-3 FISH OIL PO           omeprazole 20 MG CR capsule    priLOSEC    30 capsule    Take 1 capsule (20 mg) by mouth daily    Sudden hearing loss       simvastatin 20 MG tablet    ZOCOR     Take 10 mg by mouth At Bedtime        zolpidem 5 MG tablet    AMBIEN    15 tablet    Take 1 tablet (5 mg) by mouth nightly as needed for sleep    Travel advice encounter

## 2018-03-15 NOTE — PROGRESS NOTES
SUBJECTIVE:   Bear Guido is a 72 year old male who presents to clinic today for the following health issues:      Lump       Duration: noticed 2 weeks ago     Description (location/character/radiation): lump in middle of back on LT side of spine about the size of a nickel, patient states it has not grown in size since noticing it     Intensity:  mild    Accompanying signs and symptoms: none     History (similar episodes/previous evaluation): None    Precipitating or alleviating factors: None    Therapies tried and outcome: None       ROS: As per HPI.  Constitutional: no recent illness, no fevers/sweats/chills  Lymph: no swollen nodes    Allergies, reviewed:     Allergies   Allergen Reactions     Cyclobenzaprine Unknown     Flushing     Seasonal Allergies      Spring and fall allergies      Med list prior to vist:    Current Outpatient Prescriptions on File Prior to Visit:  zolpidem (AMBIEN) 5 MG tablet Take 1 tablet (5 mg) by mouth nightly as needed for sleep   omeprazole (PRILOSEC) 20 MG CR capsule Take 1 capsule (20 mg) by mouth daily   simvastatin (ZOCOR) 20 MG tablet Take 10 mg by mouth At Bedtime   fluticasone (FLONASE) 50 MCG/ACT spray Spray 1 spray into both nostrils as needed for rhinitis or allergies   albuterol (PROAIR HFA, PROVENTIL HFA, VENTOLIN HFA) 108 (90 BASE) MCG/ACT inhaler Inhale 2 puffs into the lungs every 6 hours as needed for shortness of breath / dyspnea or wheezing   fluticasone-salmeterol (ADVAIR) 500-50 MCG/DOSE diskus inhaler Inhale 1 puff into the lungs every 12 hours   aspirin 81 MG tablet Take by mouth daily   Omega-3 Fatty Acids (OMEGA-3 FISH OIL PO)    atovaquone-proguanil (MALARONE) 250-100 MG per tablet Take 1 tablet by mouth daily Start 2 days before exposure to Malaria and continue daily till  7 days after exposure.     No current facility-administered medications on file prior to visit.   Medications reviewed and updated    Objective:  /77  Pulse 74  Temp 96.8  F (36  " C) (Tympanic)  Ht 5' 4\" (1.626 m)  Wt 137 lb (62.1 kg)  SpO2 95%  BMI 23.52 kg/m2  General Appearance: Pleasant, alert, WN/WD in no acute respiratory distress.  Skin: 7mm cyst, with associated follicle  Neurologic Exam: Nonfocal, no tremor. Normal gait.  Psychiatric Exam: Alert - appropriate, normal affect    ASSESSMENT/PLAN:    ICD-10-CM    1. Sebaceous cyst L72.3       small inf hair follicle or cyst  Warm packs, massage  Follow up: Return as needed if symptoms fail to improve  Counselled on medication choice, use, side effects of medications, nonprescription adjunct treatment and appropriate follow up. Couns time: 15 minutes of 15 minutes total face to face time.      Russell Alcantara MD MPH    "

## 2018-03-16 ASSESSMENT — ASTHMA QUESTIONNAIRES: ACT_TOTALSCORE: 23

## 2018-09-04 ENCOUNTER — OFFICE VISIT (OUTPATIENT)
Dept: FAMILY MEDICINE | Facility: CLINIC | Age: 73
End: 2018-09-04
Payer: COMMERCIAL

## 2018-09-04 VITALS
WEIGHT: 133 LBS | TEMPERATURE: 97.9 F | RESPIRATION RATE: 16 BRPM | BODY MASS INDEX: 22.71 KG/M2 | HEIGHT: 64 IN | OXYGEN SATURATION: 98 % | SYSTOLIC BLOOD PRESSURE: 131 MMHG | DIASTOLIC BLOOD PRESSURE: 83 MMHG | HEART RATE: 72 BPM

## 2018-09-04 DIAGNOSIS — S61.309A NAIL AVULSION, FINGER, INITIAL ENCOUNTER: Primary | ICD-10-CM

## 2018-09-04 PROCEDURE — 99213 OFFICE O/P EST LOW 20 MIN: CPT | Performed by: FAMILY MEDICINE

## 2018-09-04 NOTE — MR AVS SNAPSHOT
"              After Visit Summary   9/4/2018    Bear Guido    MRN: [id number]           Patient Information     Date Of Birth          1945        Visit Information        Provider Department      9/4/2018 1:00 PM Russell Alcantara MD [facility]        Today's Diagnoses     Nail avulsion, finger, initial encounter    -  1       Follow-ups after your visit        Who to contact     If you have questions or need follow up information about today's clinic visit or your schedule please contact [facility] directly at [phone].  Normal or non-critical lab and imaging results will be communicated to you by RSB SPINEhart, letter or phone within 4 business days after the clinic has received the results. If you do not hear from us within 7 days, please contact the clinic through RSB SPINEhart or phone. If you have a critical or abnormal lab result, we will notify you by phone as soon as possible.  Submit refill requests through Shoot it! or call your pharmacy and they will forward the refill request to us. Please allow 3 business days for your refill to be completed.          Additional Information About Your Visit        MyChart Information     Shoot it! gives you secure access to your electronic health record. If you see a primary care provider, you can also send messages to your care team and make appointments. If you have questions, please call your primary care clinic.  If you do not have a primary care provider, please call [phone] and they will assist you.        Care EveryWhere ID     This is your Care EveryWhere ID. This could be used by other organizations to access your [place] medical records  [id number]        Your Vitals Were     Pulse Temperature Respirations Height Pulse Oximetry BMI (Body Mass Index)    72 97.9  F (36.6  C) (Oral) 16 5' 4\" (1.626 m) 98% 22.83 kg/m2       Blood Pressure from Last 3 Encounters:   09/04/18 131/83   03/15/18 129/77   03/07/18 136/76    Weight " from Last 3 Encounters:   09/04/18 133 lb (60.3 kg)   03/15/18 137 lb (62.1 kg)   02/14/18 131 lb (59.4 kg)              Today, you had the following     No orders found for display       Primary Care Provider Office Phone # Fax #    Russell Murali Alcantara [phone]       [address] [facility] [address]        Equal Access to Services     [facility] Center[facility]IANA : Hadii aad ku hadasho Soomaali, waaxda luqadaha, qaybta kaalmada adeegyada, waxay idiin hayaan adeeg nildaswapnajean pierre lamariam . So [facility] [phone].    ATENCIÓN: Si guanaco mack, tiene a garcias disposición servicios gratuitos de asistencia lingüística. [facility] [phone].    We comply with applicable federal civil rights laws and [place] laws. We do not discriminate on the basis of race, color, national origin, age, disability, sex, sexual orientation, or gender identity.            Thank you!     Thank you for choosing [facility]  for your care. Our goal is always to provide you with excellent care. Hearing back from our patients is one way we can continue to improve our services. Please take a few minutes to complete the written survey that you may receive in the mail after your visit with us. Thank you!             Your Updated Medication List - Protect others around you: Learn how to safely use, store and throw away your medicines at www.disposemymeds.org.          This list is accurate as of 9/4/18  1:59 PM.  Always use your most recent med list.                   Brand Name Dispense Instructions for use Diagnosis    albuterol 108 (90 Base) MCG/ACT inhaler    PROAIR HFA/PROVENTIL HFA/VENTOLIN HFA    3 Inhaler    Inhale 2 puffs into the lungs every 6 hours as needed for shortness of breath / dyspnea or wheezing        aspirin 81 MG tablet      Take by mouth daily        atovaquone-proguanil 250-100 MG per tablet    MALARONE    30 tablet    Take 1 tablet by mouth daily Start 2 days before exposure to Malaria and continue daily  till  7 days after exposure.    Travel advice encounter       FLONASE 50 MCG/ACT spray   Generic drug:  fluticasone      Spray 1 spray into both nostrils as needed for rhinitis or allergies        fluticasone-salmeterol 500-50 MCG/DOSE diskus inhaler    ADVAIR     Inhale 1 puff into the lungs every 12 hours        OMEGA-3 FISH OIL PO           omeprazole 20 MG CR capsule    priLOSEC    30 capsule    Take 1 capsule (20 mg) by mouth daily    Sudden hearing loss       simvastatin 20 MG tablet    ZOCOR     Take 10 mg by mouth At Bedtime        zolpidem 5 MG tablet    AMBIEN    15 tablet    Take 1 tablet (5 mg) by mouth nightly as needed for sleep    Travel advice encounter

## 2018-09-04 NOTE — PROGRESS NOTES
SUBJECTIVE:   Bear Guido is a 73 year old male who presents to clinic today for the following health issues:      Joint Pain    Onset: last week Tuesday     Patient states he was throwing something outside and that the door hit his right thumb with the door frame     Description:   Location: right thumb injury   Character: Dull ache    Intensity: moderate    Progression of Symptoms: same    Accompanying Signs & Symptoms:  Other symptoms: swelling, pain radiating from thumb to wrist, redness and dry blood     History:   Previous similar pain: no       Precipitating factors:   Trauma or overuse: no     Alleviating factors:  Improved by: nothing    Therapies Tried and outcome: patient has been taking cephalexin started feeling better.       ROS: As per HPI.  Constitutional: , no fevers/sweats/chills    Allergies, reviewed:     Allergies   Allergen Reactions     Cyclobenzaprine Unknown     Flushing     Seasonal Allergies      Spring and fall allergies      Med list prior to vist:    Current Outpatient Prescriptions on File Prior to Visit:  albuterol (PROAIR HFA, PROVENTIL HFA, VENTOLIN HFA) 108 (90 BASE) MCG/ACT inhaler Inhale 2 puffs into the lungs every 6 hours as needed for shortness of breath / dyspnea or wheezing   aspirin 81 MG tablet Take by mouth daily   atovaquone-proguanil (MALARONE) 250-100 MG per tablet Take 1 tablet by mouth daily Start 2 days before exposure to Malaria and continue daily till  7 days after exposure.   fluticasone (FLONASE) 50 MCG/ACT spray Spray 1 spray into both nostrils as needed for rhinitis or allergies   fluticasone-salmeterol (ADVAIR) 500-50 MCG/DOSE diskus inhaler Inhale 1 puff into the lungs every 12 hours   Omega-3 Fatty Acids (OMEGA-3 FISH OIL PO)    omeprazole (PRILOSEC) 20 MG CR capsule Take 1 capsule (20 mg) by mouth daily   simvastatin (ZOCOR) 20 MG tablet Take 10 mg by mouth At Bedtime   zolpidem (AMBIEN) 5 MG tablet Take 1 tablet (5 mg) by mouth nightly as needed for  "sleep     No current facility-administered medications on file prior to visit.   Medications reviewed and updated    Objective:  /83  Pulse 72  Temp 97.9  F (36.6  C) (Oral)  Resp 16  Ht 5' 4\" (1.626 m)  Wt 133 lb (60.3 kg)  SpO2 98%  BMI 22.83 kg/m2  General Appearance: Pleasant, alert, WN/WD in no acute respiratory distress.  Cardiovascular Exam: No edema or vascular insufficiency.  Skin: Right thumb nail is partially avulsed, some subungual hematoma that has been released from the sides of the nail no current signs of infection    Neurologic Exam: Nonfocal, no tremor. Normal gait.  Psychiatric Exam: Alert - appropriate, normal affect    ASSESSMENT/PLAN:    ICD-10-CM    1. Nail avulsion, finger, initial encounter S61.309A       Nail avulsion apparently with subsequent localized cellulitis  However I do not see any sign of that today I suggest that patient should continue to take antibiotics, since they may have been helpful  Advised on natural history of nail avulsion and eventual regrowth of the nail    Follow up: Return as needed if symptoms fail to improve    Russell Alcantara MD MPH    "

## 2018-09-04 NOTE — NURSING NOTE
"Chief Complaint   Patient presents with     Musculoskeletal Problem     /83  Pulse 72  Temp 97.9  F (36.6  C) (Oral)  Resp 16  Ht 5' 4\" (1.626 m)  Wt 133 lb (60.3 kg)  SpO2 98%  BMI 22.83 kg/m2 Estimated body mass index is 22.83 kg/(m^2) as calculated from the following:    Height as of this encounter: 5' 4\" (1.626 m).    Weight as of this encounter: 133 lb (60.3 kg).  bp completed using cuff size: regular       Health Maintenance addressed:  ACT    Possibly completing today per provider review.    Negin Guardado MA     "

## 2018-09-06 ASSESSMENT — ASTHMA QUESTIONNAIRES: ACT_TOTALSCORE: 23

## 2018-11-28 ENCOUNTER — MYC MEDICAL ADVICE (OUTPATIENT)
Dept: FAMILY MEDICINE | Facility: CLINIC | Age: 73
End: 2018-11-28

## 2018-11-28 DIAGNOSIS — E78.5 HYPERLIPIDEMIA WITH TARGET LDL LESS THAN 130: Primary | ICD-10-CM

## 2018-11-29 ENCOUNTER — MYC MEDICAL ADVICE (OUTPATIENT)
Dept: FAMILY MEDICINE | Facility: CLINIC | Age: 73
End: 2018-11-29

## 2018-11-29 NOTE — TELEPHONE ENCOUNTER
JF-  Please see My Chart message below and advise as appropriate.  CHRISTIAN ReynaN, RN  [employer] [facility]

## 2018-11-30 ENCOUNTER — MYC MEDICAL ADVICE (OUTPATIENT)
Dept: FAMILY MEDICINE | Facility: CLINIC | Age: 73
End: 2018-11-30

## 2018-12-06 DIAGNOSIS — E78.5 HYPERLIPIDEMIA WITH TARGET LDL LESS THAN 130: ICD-10-CM

## 2018-12-06 LAB
ANION GAP SERPL CALCULATED.3IONS-SCNC: 8 MMOL/L (ref 3–14)
BUN SERPL-MCNC: 13 MG/DL (ref 7–30)
CALCIUM SERPL-MCNC: 9.1 MG/DL (ref 8.5–10.1)
CHLORIDE SERPL-SCNC: 102 MMOL/L (ref 94–109)
CHOLEST SERPL-MCNC: 261 MG/DL
CO2 SERPL-SCNC: 28 MMOL/L (ref 20–32)
CREAT SERPL-MCNC: 0.97 MG/DL (ref 0.66–1.25)
GFR SERPL CREATININE-BSD FRML MDRD: 76 ML/MIN/1.7M2
GLUCOSE SERPL-MCNC: 91 MG/DL (ref 70–99)
HDLC SERPL-MCNC: 54 MG/DL
LDLC SERPL CALC-MCNC: 173 MG/DL
NONHDLC SERPL-MCNC: 207 MG/DL
POTASSIUM SERPL-SCNC: 4 MMOL/L (ref 3.4–5.3)
SODIUM SERPL-SCNC: 138 MMOL/L (ref 133–144)
TRIGL SERPL-MCNC: 172 MG/DL

## 2018-12-06 PROCEDURE — 36415 COLL VENOUS BLD VENIPUNCTURE: CPT | Performed by: FAMILY MEDICINE

## 2018-12-06 PROCEDURE — 80048 BASIC METABOLIC PNL TOTAL CA: CPT | Performed by: FAMILY MEDICINE

## 2018-12-06 PROCEDURE — 80061 LIPID PANEL: CPT | Performed by: FAMILY MEDICINE

## 2018-12-17 ENCOUNTER — OFFICE VISIT (OUTPATIENT)
Dept: FAMILY MEDICINE | Facility: CLINIC | Age: 73
End: 2018-12-17
Payer: COMMERCIAL

## 2018-12-17 VITALS
DIASTOLIC BLOOD PRESSURE: 82 MMHG | SYSTOLIC BLOOD PRESSURE: 143 MMHG | HEART RATE: 68 BPM | OXYGEN SATURATION: 99 % | RESPIRATION RATE: 14 BRPM | BODY MASS INDEX: 23.49 KG/M2 | HEIGHT: 64 IN | TEMPERATURE: 98.4 F | WEIGHT: 137.56 LBS

## 2018-12-17 DIAGNOSIS — E78.5 HYPERLIPIDEMIA WITH TARGET LDL LESS THAN 130: ICD-10-CM

## 2018-12-17 DIAGNOSIS — J45.40 MODERATE PERSISTENT ASTHMA WITHOUT COMPLICATION: ICD-10-CM

## 2018-12-17 DIAGNOSIS — R73.9 HYPERGLYCEMIA: ICD-10-CM

## 2018-12-17 DIAGNOSIS — Z00.00 ROUTINE HISTORY AND PHYSICAL EXAMINATION OF ADULT: Primary | ICD-10-CM

## 2018-12-17 DIAGNOSIS — F51.02 TRANSIENT INSOMNIA: ICD-10-CM

## 2018-12-17 DIAGNOSIS — I47.10 SVT (SUPRAVENTRICULAR TACHYCARDIA) (H): ICD-10-CM

## 2018-12-17 PROCEDURE — G0438 PPPS, INITIAL VISIT: HCPCS | Performed by: FAMILY MEDICINE

## 2018-12-17 RX ORDER — SIMVASTATIN 20 MG
10 TABLET ORAL AT BEDTIME
Qty: 90 TABLET | Refills: 3 | Status: SHIPPED | OUTPATIENT
Start: 2018-12-17 | End: 2018-12-24

## 2018-12-17 RX ORDER — ZOLPIDEM TARTRATE 5 MG/1
5 TABLET ORAL
Qty: 15 TABLET | Refills: 0 | Status: SHIPPED | OUTPATIENT
Start: 2018-12-17 | End: 2019-05-13

## 2018-12-17 ASSESSMENT — ACTIVITIES OF DAILY LIVING (ADL): CURRENT_FUNCTION: NO ASSISTANCE NEEDED

## 2018-12-17 ASSESSMENT — MIFFLIN-ST. JEOR: SCORE: 1284.98

## 2018-12-17 ASSESSMENT — PAIN SCALES - GENERAL: PAINLEVEL: NO PAIN (0)

## 2018-12-17 NOTE — NURSING NOTE
"Chief Complaint   Patient presents with     Medicare Visit     /82 (BP Location: Left arm, Patient Position: Sitting, Cuff Size: Adult Regular)   Pulse 68   Temp 98.4  F (36.9  C) (Tympanic)   Resp 14   Ht 1.626 m (5' 4\")   Wt 62.4 kg (137 lb 9 oz)   SpO2 99%   BMI 23.61 kg/m   Estimated body mass index is 23.61 kg/m  as calculated from the following:    Height as of this encounter: 1.626 m (5' 4\").    Weight as of this encounter: 62.4 kg (137 lb 9 oz).  bp completed using cuff size: regular    All.RUBENS An                "

## 2018-12-17 NOTE — PROGRESS NOTES
"SUBJECTIVE:   Bear Guido is a 73 year old male who presents for Preventive Visit.  Are you in the first 12 months of your Medicare coverage?  No    Annual Wellness Visit     In general, how would you rate your overall health?  Excellent    Frequency of exercise:  4-5 days/week    Duration of exercise:  15-30 minutes    Do you usually eat at least 4 servings of fruit and vegetables a day, include whole grains    & fiber and avoid regularly eating high fat or \"junk\" foods?  Yes    Taking medications regularly:  Yes    Medication side effects:  Not applicable    Ability to successfully perform activities of daily living:  No assistance needed    Home Safety:  No safety concerns identified    Hearing Impairment:  Difficulty following a conversation in a noisy restaurant or crowded room, find that men's voices are easier to understand than woman's and difficulty understanding soft or whispered speech    In the past 6 months, have you been bothered by leaking of urine?  No    In general, how would you rate your overall mental or emotional health?  Excellent    PHQ-2 Total Score: 0    Additional concerns today:  No    Do you feel safe in your environment? No    Do you have a Health Care Directive? Yes: Advance Directive has been received and scanned.      The 10-year ASCVD risk score (Jaketra SCHWARZ Jr., et al., 2013) is: 26.4%    Values used to calculate the score:      Age: 73 years      Sex: Male      Is Non- : No      Diabetic: No      Tobacco smoker: No      Systolic Blood Pressure: 143 mmHg      Is BP treated: No      HDL Cholesterol: 54 mg/dL      Total Cholesterol: 261 mg/dL    He was off his cholesterol medication for this and he would like to restart it again    Fall risk  Fallen 2 or more times in the past year?: No  Any fall with injury in the past year?: No    Cognitive Screening   1) Repeat 3 items (Leader, Season, Table)    2) Clock draw: ABNORMAL   3) 3 item recall: Recalls 2 objects "   Results: NORMAL clock, 1-2 items recalled: COGNITIVE IMPAIRMENT LESS LIKELY    Mini-CogTM Copyright ANU [employer]. Licensed by the author for use in [facility]; reprinted with permission ([email or web address]). All rights reserved.      Do you have sleep apnea, excessive snoring or daytime drowsiness?: no    Reviewed and updated as needed this visit by clinical staff  Tobacco  Allergies  Meds         Reviewed and updated as needed this visit by Provider        Social History     Tobacco Use     Smoking status: Never Smoker     Smokeless tobacco: Never Used   Substance Use Topics     Alcohol use: Yes     Alcohol/week: 0.0 oz     Comment: 1-2 galsses most days       Alcohol Use 12/17/2018   If you drink alcohol do you typically have greater than 3 drinks per day OR greater than 7 drinks per week? No         Current providers sharing in care for this patient include:   Patient Care Team:  Russell Alcantara MD as PCP - General (Family Practice)    The following health maintenance items are reviewed in Epic and correct as of today:  Health Maintenance   Topic Date Due     HEPATITIS C SCREENING  08/19/1963     ADVANCE DIRECTIVE PLANNING Q5 YRS  08/19/2001     ZOSTER IMMUNIZATION (2 of 3) 12/23/2014     PNEUMOVAX IMMUNIZATION 65+ LOW/MEDIUM RISK (2 of 2 - PPSV23) 10/21/2016     ASTHMA ACTION PLAN Q1 YR  06/26/2018     FALL RISK ASSESSMENT  02/14/2019     ASTHMA CONTROL TEST Q6 MOS  03/04/2019     PHQ-2 Q1 YR  12/17/2019     LIPID SCREEN Q5 YR MALE (SYSTEM ASSIGNED)  12/06/2023     DTAP/TDAP/TD IMMUNIZATION (2 - Td) 01/06/2026     COLONOSCOPY Q10 YR  10/10/2027     INFLUENZA VACCINE  Completed     AORTIC ANEURYSM SCREENING (SYSTEM ASSIGNED)  Completed     IPV IMMUNIZATION  Aged Out     MENINGITIS IMMUNIZATION  Aged Out     Labs reviewed in EPIC  BP Readings from Last 3 Encounters:   12/17/18 143/82   09/04/18 131/83   03/15/18 129/77    Wt Readings from Last 3 Encounters:   12/17/18 62.4 kg (137 lb 9 oz)    09/04/18 60.3 kg (133 lb)   03/15/18 62.1 kg (137 lb)                  Patient Active Problem List   Diagnosis     Hyperlipidemia with target LDL less than 130     Moderate persistent asthma     Allergic rhinitis     Traumatic myositis ossificans     Vitreous degeneration     History of hematuria     Hyperglycemia     Transient insomnia     Tachycardia     Chest pain     SVT (supraventricular tachycardia) (H)     Past Surgical History:   Procedure Laterality Date     COLONOSCOPY N/A 10/10/2017    Procedure: COLONOSCOPY;  colonoscopy;  Surgeon: Ervin Irizarry MD;  Location:  GI     ENT SURGERY  [facility]    ear surgery       Social History     Tobacco Use     Smoking status: Never Smoker     Smokeless tobacco: Never Used   Substance Use Topics     Alcohol use: Yes     Alcohol/week: 0.0 oz     Comment: 1-2 galsses most days     Family History   Problem Relation Age of Onset     High cholesterol Mother      Allergies Mother      Cancer Father 62        Lung cancer as a result of smoking     Asthma Father      High cholesterol Father      Heart Disease Maternal Grandfather      Cerebrovascular Disease Maternal Grandfather      Heart Disease Paternal Grandmother      Heart Disease Paternal Grandfather          Current Outpatient Medications   Medication Sig Dispense Refill     albuterol (PROAIR HFA, PROVENTIL HFA, VENTOLIN HFA) 108 (90 BASE) MCG/ACT inhaler Inhale 2 puffs into the lungs every 6 hours as needed for shortness of breath / dyspnea or wheezing 3 Inhaler 1     aspirin 81 MG tablet Take by mouth daily       atovaquone-proguanil (MALARONE) 250-100 MG per tablet Take 1 tablet by mouth daily Start 2 days before exposure to Malaria and continue daily till  7 days after exposure. 30 tablet 0     fluticasone (FLONASE) 50 MCG/ACT spray Spray 1 spray into both nostrils as needed for rhinitis or allergies       fluticasone-salmeterol (ADVAIR) 500-50 MCG/DOSE diskus inhaler Inhale 1 puff into the lungs every 12 hours    "    Omega-3 Fatty Acids (OMEGA-3 FISH OIL PO)        omeprazole (PRILOSEC) 20 MG CR capsule Take 1 capsule (20 mg) by mouth daily 30 capsule 0     simvastatin (ZOCOR) 20 MG tablet Take 10 mg by mouth At Bedtime       zolpidem (AMBIEN) 5 MG tablet Take 1 tablet (5 mg) by mouth nightly as needed for sleep 15 tablet 0     Allergies   Allergen Reactions     Cyclobenzaprine Unknown     Flushing     Seasonal Allergies      Spring and fall allergies     Immunization History   Administered Date(s) Administered     HEPA 11/15/1995, 02/17/1997     HepB 09/30/2009, 12/08/2009, 09/30/2010     Influenza (High Dose) 3 valent vaccine 10/21/2015, 09/28/2016, 09/21/2017     Influenza (IIV3) PF 10/15/2014     Pneumo Conj 13-V (2010&after) 10/21/2015     Pneumococcal 23 valent 07/20/2009     TDAP Vaccine (Adacel) 01/06/2016     Tdap (Adacel,Boostrix) 12/28/2007     Typhoid Oral 10/20/2014     Zoster vaccine, live 10/28/2014         Review of Systems    Constitutional: no recent illness, no fevers/sweats/chills  Eyes: No vision changes or eye irritation  Ears/Nose/Throat: No runny nose, sore throat or ear pain  CV: no palpitations, no chest pain, no lower extremity swelling.  Resp: no shortness of breath, wheezing, or cough.  GI: no nausea/vomiting/diarrhea, no black or bloody stools  : no burning or urgency with urination  Skin: no rash  Musculoskeletal: no joint pain, muscle pain, weakness  Psych: no depression, no concerns about anxiety  Neuro: no new headaches, dizziness, numbness, or tingling      OBJECTIVE:   /82 (BP Location: Left arm, Patient Position: Sitting, Cuff Size: Adult Regular)   Pulse 68   Temp 98.4  F (36.9  C) (Tympanic)   Resp 14   Ht 1.626 m (5' 4\")   Wt 62.4 kg (137 lb 9 oz)   SpO2 99%   BMI 23.61 kg/m   Estimated body mass index is 23.61 kg/m  as calculated from the following:    Height as of this encounter: 1.626 m (5' 4\").    Weight as of this encounter: 62.4 kg (137 lb 9 oz).  Physical " "Exam    General Appearance: Pleasant, alert, in no acute respiratory distress.  Head Exam: Normal. Normocephalic, atraumatic. No sinus tenderness.  Eye Exam: Normal external eye, conjunctiva, lids. JHONATAN.  Ear Exam: Normal auditory canals and external ears. Non-tender.  Left TM-normal. Right TM-normal.  OroPharynx Exam: Dental hygiene adequate. Normal buccal mucosa. Normal pharynx.  Neck Exam: Supple, no masses or enlarged, tender nodes.  Thyroid Exam: No nodules or enlargement or tenderness.  Chest/Respiratory Exam: Normal, comfortable, easy respirations. Chest wall normal. Lungs are clear to auscultation. No wheezing, crackles, or rhonchi.  Cardiovascular Exam: Regular rate and rhythm. No murmur, gallop, or rubs. No pedal edema.  Gastrointestinal Exam: Soft, non-tender, no masses or organomegaly.  Musculoskeletal Exam: Back is non-tender, full ROM of upper and lower extremities.  Skin: no rash, warm and dry.    Neurologic Exam: Nonfocal, no tremor. Normal gait.  Psychiatric Exam: Alert - appropriate, normal affect      ASSESSMENT / PLAN:       ICD-10-CM    1. Routine history and physical examination of adult Z00.00    2. Hyperlipidemia with target LDL less than 130 E78.5 simvastatin (ZOCOR) 20 MG tablet   3. Hyperglycemia R73.9    4. Transient insomnia F51.02 zolpidem (AMBIEN) 5 MG tablet   5. Moderate persistent asthma without complication J45.40    6. SVT (supraventricular tachycardia) (H) I47.1        End of Life Planning:  Patient currently has an advanced directive: Yes.  Practitioner is supportive of decision.    COUNSELING:  Reviewed preventive health counseling, as reflected in patient instructions       Regular exercise       Healthy diet/nutrition    BP Readings from Last 1 Encounters:   12/17/18 143/82     Estimated body mass index is 23.61 kg/m  as calculated from the following:    Height as of this encounter: 1.626 m (5' 4\").    Weight as of this encounter: 62.4 kg (137 lb 9 oz).           reports that "  has never smoked. he has never used smokeless tobacco.      Appropriate preventive services were discussed with this patient, including applicable screening as appropriate for cardiovascular disease, diabetes, osteopenia/osteoporosis, and glaucoma.  As appropriate for age/gender, discussed screening for colorectal cancer, prostate cancer, breast cancer, and cervical cancer. Checklist reviewing preventive services available has been given to the patient.    Reviewed patients plan of care and provided an AVS. The Basic Care Plan (routine screening as documented in Health Maintenance) for Bear meets the Care Plan requirement. This Care Plan has been established and reviewed with the Patient.    Counseling Resources:  ATP IV Guidelines  Pooled Cohorts Equation Calculator  Breast Cancer Risk Calculator  FRAX Risk Assessment  ICSI Preventive Guidelines  Dietary Guidelines for Americans, 2010  USDA's MyPlate  ASA Prophylaxis  Lung CA Screening    Russell Murali Alcantara MD  [facility]

## 2018-12-17 NOTE — PATIENT INSTRUCTIONS
Preventive Health Recommendations:     See your health care provider every year to    Review health changes.     Discuss preventive care.      Review your medicines if your doctor has prescribed any.    Talk with your health care provider about whether you should have a test to screen for prostate cancer (PSA).    Every 3 years, have a diabetes test (fasting glucose). If you are at risk for diabetes, you should have this test more often.    Every 5 years, have a cholesterol test. Have this test more often if you are at risk for high cholesterol or heart disease.     Every 10 years, have a colonoscopy. Or, have a yearly FIT test (stool test). These exams will check for colon cancer.    Talk to with your health care provider about screening for Abdominal Aortic Aneurysm if you have a family history of AAA or have a history of smoking.  Shots:     Get a flu shot each year.     Get a tetanus shot every 10 years.     Talk to your doctor about your pneumonia vaccines. There are now two you should receive - Pneumovax (PPSV 23) and Prevnar (PCV 13).    Talk to your pharmacist about a shingles vaccine.     Talk to your doctor about the hepatitis B vaccine.  Nutrition:     Eat at least 5 servings of fruits and vegetables each day.     Eat whole-grain bread, whole-wheat pasta and brown rice instead of white grains and rice.     Get adequate Calcium and Vitamin D.   Lifestyle    Exercise for at least 150 minutes a week (30 minutes a day, 5 days a week). This will help you control your weight and prevent disease.     Limit alcohol to one drink per day.     No smoking.     Wear sunscreen to prevent skin cancer.     See your dentist every six months for an exam and cleaning.     See your eye doctor every 1 to 2 years to screen for conditions such as glaucoma, macular degeneration and cataracts.    Personalized Prevention Plan  You are due for the preventive services outlined below.  Your care team is available to assist you in  scheduling these services.  If you have already completed any of these items, please share that information with your care team to update in your medical record.    Health Maintenance Due   Topic Date Due     Hepatitis C Screening  08/19/1963     Discuss Advance Directive Planning  08/19/2001     Zoster (Chicken Pox) Vaccine (2 of 3) 12/23/2014     Pneumococcal Vaccine (2 of 2 - PPSV23) 10/21/2016     Asthma Action Plan - yearly  06/26/2018

## 2018-12-24 ENCOUNTER — TELEPHONE (OUTPATIENT)
Dept: FAMILY MEDICINE | Facility: CLINIC | Age: 73
End: 2018-12-24

## 2018-12-24 DIAGNOSIS — E78.5 HYPERLIPIDEMIA WITH TARGET LDL LESS THAN 130: ICD-10-CM

## 2018-12-24 RX ORDER — SIMVASTATIN 20 MG
10 TABLET ORAL AT BEDTIME
Qty: 45 TABLET | Refills: 3 | Status: SHIPPED | OUTPATIENT
Start: 2018-12-24 | End: 2019-05-06

## 2018-12-24 NOTE — TELEPHONE ENCOUNTER
Message to prescriber: Rx for Simvastatin 20mg was sent in with a disp qty of 90 and directions of take one half tablet (10mg) PO at bedtime. Qty and sig do not match a 90 day supply. Please review and submit updated rx with qty and sig to reflect a 90 day supply to [facility], Jeannine Coosa # [phone]

## 2018-12-24 NOTE — TELEPHONE ENCOUNTER
JF  Please see message below.  Is patient to take 1/2 tab daily?  New Rx T'd up.  Thanks, Mar Aleman RN

## 2019-05-03 DIAGNOSIS — E78.5 HYPERLIPIDEMIA WITH TARGET LDL LESS THAN 130: ICD-10-CM

## 2019-05-06 RX ORDER — SIMVASTATIN 20 MG
10 TABLET ORAL AT BEDTIME
Qty: 45 TABLET | Refills: 1 | Status: SHIPPED | OUTPATIENT
Start: 2019-05-06 | End: 2020-02-17

## 2019-05-06 NOTE — TELEPHONE ENCOUNTER
"Pharmacy change  Prescription approved per [facility] Refill Protocol.  Tanisha ALMARAZ RN    Last Written Prescription Date:  12/24/2018  Last Fill Quantity: 45,  # refills: 3   Last office visit: 12/17/2018 with prescribing provider:     Future Office Visit:    Requested Prescriptions   Pending Prescriptions Disp Refills     simvastatin (ZOCOR) 20 MG tablet 45 tablet 3     Sig: Take 0.5 tablets (10 mg) by mouth At Bedtime       Statins Protocol Passed - 5/3/2019  4:46 PM        Passed - LDL on file in past 12 months     Recent Labs   Lab Test 12/06/18  0746   *             Passed - No abnormal creatine kinase in past 12 months     No lab results found.             Passed - Recent (12 mo) or future (30 days) visit within the authorizing provider's specialty     Patient had office visit in the last 12 months or has a visit in the next 30 days with authorizing provider or within the authorizing provider's specialty.  See \"Patient Info\" tab in inbasket, or \"Choose Columns\" in Meds & Orders section of the refill encounter.              Passed - Medication is active on med list        Passed - Patient is age 18 or older          "

## 2019-05-07 ENCOUNTER — MYC MEDICAL ADVICE (OUTPATIENT)
Dept: FAMILY MEDICINE | Facility: CLINIC | Age: 74
End: 2019-05-07

## 2019-05-07 DIAGNOSIS — F51.02 TRANSIENT INSOMNIA: ICD-10-CM

## 2019-05-07 NOTE — TELEPHONE ENCOUNTER
DAVINA,     Controlled Substance Refill Request for Ambien    Last refill: 12/27/2018 #15    Last clinic visit: 12/17/2018     Clinic visit frequency required: Q 6  months  Next appt: none    Controlled substance agreement on file: No.    Documentation in problem list reviewed:  Yes    Processing:  Fax Rx to [facility] pharmacy    [place] reviewed:  reviewed- no concerns  [place]  [email or web address]    Please authorize if appropriate.  Thanks,  Tanisha ALMARAZ RN

## 2019-05-13 RX ORDER — ZOLPIDEM TARTRATE 5 MG/1
5 TABLET ORAL
Qty: 15 TABLET | Refills: 0 | Status: SHIPPED | OUTPATIENT
Start: 2019-05-13 | End: 2019-09-04

## 2019-05-23 ENCOUNTER — TELEPHONE (OUTPATIENT)
Dept: FAMILY MEDICINE | Facility: CLINIC | Age: 74
End: 2019-05-23

## 2019-05-23 NOTE — TELEPHONE ENCOUNTER
Prior Authorization Retail Medication Request    Medication/Dose: zolpidem (AMBIEN) 5 MG tablet  ICD code (if different than what is on RX):    Previously Tried and Failed:  Zolpidem 5mg tablet-ineffective  Rationale:      Insurance Name:  [facility] [phone]  Insurance ID:  [id number]      Pharmacy Information (if different than what is on RX)  Name:  [facility]  Phone:  [phone]

## 2019-05-28 NOTE — TELEPHONE ENCOUNTER
Central Prior Authorization Team   Phone: [phone]    PA Initiation    Medication: zolpidem (AMBIEN) 5 MG tablet  Insurance Company: [employer] - Phone [phone] Fax [phone]  Pharmacy Filling the Rx: [facility]  Filling Pharmacy Phone: [phone]  Filling Pharmacy Fax: [phone]  Start Date: 5/28/2019

## 2019-05-28 NOTE — TELEPHONE ENCOUNTER
Prior Authorization Approval    Authorization Effective Date: 5/28/2019  Authorization Expiration Date: 5/27/2021  Medication: zolpidem (AMBIEN) 5 MG-APPROVED  Approved Dose/Quantity:    Reference #:     Insurance Company: [employer] - Phone [phone] Fax [phone]  Expected CoPay:       CoPay Card Available:      Foundation Assistance Needed:    Which Pharmacy is filling the prescription (Not needed for infusion/clinic administered): [facility]  Pharmacy Notified: Yes  Patient Notified: Yes

## 2019-09-01 ENCOUNTER — MYC MEDICAL ADVICE (OUTPATIENT)
Dept: FAMILY MEDICINE | Facility: CLINIC | Age: 74
End: 2019-09-01

## 2019-09-01 DIAGNOSIS — F51.02 TRANSIENT INSOMNIA: ICD-10-CM

## 2019-09-03 RX ORDER — ZOLPIDEM TARTRATE 5 MG/1
TABLET ORAL
Start: 2019-09-03

## 2019-09-03 NOTE — TELEPHONE ENCOUNTER
See 9/1 Ryder.  Arianue for 6 month follow up for Ambien.  Last seen 12/17/18    Patient advised to schedule appointment with DAVINA. Refill will be addressed at [facility].    Mar Aleman RN

## 2019-09-04 ENCOUNTER — OFFICE VISIT (OUTPATIENT)
Dept: FAMILY MEDICINE | Facility: CLINIC | Age: 74
End: 2019-09-04
Payer: COMMERCIAL

## 2019-09-04 VITALS
HEIGHT: 64 IN | HEART RATE: 72 BPM | TEMPERATURE: 98.5 F | DIASTOLIC BLOOD PRESSURE: 68 MMHG | OXYGEN SATURATION: 96 % | RESPIRATION RATE: 12 BRPM | BODY MASS INDEX: 23.61 KG/M2 | SYSTOLIC BLOOD PRESSURE: 118 MMHG

## 2019-09-04 DIAGNOSIS — F51.02 TRANSIENT INSOMNIA: ICD-10-CM

## 2019-09-04 PROCEDURE — 99213 OFFICE O/P EST LOW 20 MIN: CPT | Performed by: FAMILY MEDICINE

## 2019-09-04 RX ORDER — ZOLPIDEM TARTRATE 5 MG/1
5 TABLET ORAL
Qty: 15 TABLET | Refills: 0 | Status: SHIPPED | OUTPATIENT
Start: 2019-09-04 | End: 2020-03-16

## 2019-09-04 NOTE — PROGRESS NOTES
"Subjective     Bear Guido is a 74 year old male who presents to clinic today for the following health issues:    HPI   Patient is here for refill for Zolpidem for sleeping.  The patient will be traveling to [place]. He needs to sleep in the plane in order to participate in meetings as soon as he arrives.   Uses it sporadically.     Patient Active Problem List   Diagnosis     Hyperlipidemia with target LDL less than 130     Moderate persistent asthma     Allergic rhinitis     Traumatic myositis ossificans     Vitreous degeneration     History of hematuria     Hyperglycemia     Transient insomnia     Tachycardia     Chest pain     SVT (supraventricular tachycardia) (H)     Past Surgical History:   Procedure Laterality Date     COLONOSCOPY N/A 10/10/2017    Procedure: COLONOSCOPY;  colonoscopy;  Surgeon: Ervin Irizarry MD;  Location:  GI     ENT SURGERY  [facility]    ear surgery       Social History     Tobacco Use     Smoking status: Never Smoker     Smokeless tobacco: Never Used   Substance Use Topics     Alcohol use: Yes     Alcohol/week: 0.0 oz     Comment: 1-2 galsses most days     Family History   Problem Relation Age of Onset     High cholesterol Mother      Allergies Mother      Cancer Father 62        Lung cancer as a result of smoking     Asthma Father      High cholesterol Father      Heart Disease Maternal Grandfather      Cerebrovascular Disease Maternal Grandfather      Heart Disease Paternal Grandmother      Heart Disease Paternal Grandfather            Reviewed and updated as needed this visit by Provider         Review of Systems   ROS COMP: Constitutional, HEENT, cardiovascular, pulmonary, gi and gu systems are negative, except as otherwise noted.      Objective    /68   Pulse 72   Temp 98.5  F (36.9  C) (Oral)   Resp 12   Ht 1.626 m (5' 4\")   SpO2 96%   BMI 23.61 kg/m    Body mass index is 23.61 kg/m .  Physical Exam   GENERAL: healthy, alert and no distress  RESP: lungs clear to " auscultation - no rales, rhonchi or wheezes  CV: regular rate and rhythm, normal S1 S2, no S3 or S4, no murmur, click or rub, no peripheral edema and peripheral pulses strong    Diagnostic Test Results:  Labs reviewed in Epic        Assessment & Plan       ICD-10-CM    1. Transient insomnia F51.02 zolpidem (AMBIEN) 5 MG tablet         No follow-ups on file.    Khris Lane MD  [facility]

## 2019-09-05 ASSESSMENT — ASTHMA QUESTIONNAIRES: ACT_TOTALSCORE: 22

## 2019-09-28 ENCOUNTER — HEALTH MAINTENANCE LETTER (OUTPATIENT)
Age: 74
End: 2019-09-28

## 2020-02-06 ENCOUNTER — TRANSFERRED RECORDS (OUTPATIENT)
Dept: HEALTH INFORMATION MANAGEMENT | Facility: CLINIC | Age: 75
End: 2020-02-06

## 2020-02-17 DIAGNOSIS — E78.5 HYPERLIPIDEMIA WITH TARGET LDL LESS THAN 130: ICD-10-CM

## 2020-02-17 RX ORDER — SIMVASTATIN 20 MG
TABLET ORAL
Qty: 15 TABLET | Refills: 0 | Status: SHIPPED | OUTPATIENT
Start: 2020-02-17 | End: 2020-03-16

## 2020-02-17 NOTE — TELEPHONE ENCOUNTER
Medication is being filled for 1 time refill only due to:  Patient needs to be seen because due for fasting physical .   Tanisha ALMARAZ RN

## 2020-02-17 NOTE — TELEPHONE ENCOUNTER
"SIMVASTATIN 20 MG Tablet  Last Written Prescription Date:  05/06/2019  Last Fill Quantity: 45,  # refills: 1   Last office visit: 9/4/2019 with prescribing provider:  LOUIE   Future Office Visit:  Nothing at this time scheduled.    Requested Prescriptions   Pending Prescriptions Disp Refills     simvastatin (ZOCOR) 20 MG tablet [Pharmacy Med Name: SIMVASTATIN 20 MG Tablet] 45 tablet 1     Sig: TAKE 1/2 TABLET (10 MG) BY MOUTH AT BEDTIME       Statins Protocol Failed - 2/17/2020 12:03 PM        Failed - LDL on file in past 12 months     Recent Labs   Lab Test 12/06/18  0746   *             Passed - No abnormal creatine kinase in past 12 months     No lab results found.             Passed - Recent (12 mo) or future (30 days) visit within the authorizing provider's specialty     Patient has had an office visit with the authorizing provider or a provider within the authorizing providers department within the previous 12 mos or has a future within next 30 days. See \"Patient Info\" tab in inbasket, or \"Choose Columns\" in Meds & Orders section of the refill encounter.              Passed - Medication is active on med list        Passed - Patient is age 18 or older        "

## 2020-03-09 DIAGNOSIS — E78.5 HYPERLIPIDEMIA WITH TARGET LDL LESS THAN 130: ICD-10-CM

## 2020-03-09 DIAGNOSIS — Z13.220 LIPID SCREENING: ICD-10-CM

## 2020-03-09 PROCEDURE — 80053 COMPREHEN METABOLIC PANEL: CPT | Performed by: FAMILY MEDICINE

## 2020-03-09 PROCEDURE — 36415 COLL VENOUS BLD VENIPUNCTURE: CPT | Performed by: FAMILY MEDICINE

## 2020-03-09 PROCEDURE — 80061 LIPID PANEL: CPT | Performed by: FAMILY MEDICINE

## 2020-03-11 LAB
ALBUMIN SERPL-MCNC: 3.3 G/DL (ref 3.4–5)
ALP SERPL-CCNC: 56 U/L (ref 40–150)
ALT SERPL W P-5'-P-CCNC: 24 U/L (ref 0–70)
ANION GAP SERPL CALCULATED.3IONS-SCNC: 6 MMOL/L (ref 3–14)
AST SERPL W P-5'-P-CCNC: 16 U/L (ref 0–45)
BILIRUB SERPL-MCNC: 0.6 MG/DL (ref 0.2–1.3)
BUN SERPL-MCNC: 16 MG/DL (ref 7–30)
CALCIUM SERPL-MCNC: 8.9 MG/DL (ref 8.5–10.1)
CHLORIDE SERPL-SCNC: 106 MMOL/L (ref 94–109)
CHOLEST SERPL-MCNC: 188 MG/DL
CO2 SERPL-SCNC: 26 MMOL/L (ref 20–32)
CREAT SERPL-MCNC: 0.86 MG/DL (ref 0.66–1.25)
GFR SERPL CREATININE-BSD FRML MDRD: 86 ML/MIN/{1.73_M2}
GLUCOSE SERPL-MCNC: 82 MG/DL (ref 70–99)
HDLC SERPL-MCNC: 55 MG/DL
LDLC SERPL CALC-MCNC: 114 MG/DL
NONHDLC SERPL-MCNC: 133 MG/DL
POTASSIUM SERPL-SCNC: 4.3 MMOL/L (ref 3.4–5.3)
PROT SERPL-MCNC: 7.4 G/DL (ref 6.8–8.8)
SODIUM SERPL-SCNC: 138 MMOL/L (ref 133–144)
TRIGL SERPL-MCNC: 97 MG/DL

## 2020-03-15 ENCOUNTER — HEALTH MAINTENANCE LETTER (OUTPATIENT)
Age: 75
End: 2020-03-15

## 2020-03-15 ENCOUNTER — MYC MEDICAL ADVICE (OUTPATIENT)
Dept: FAMILY MEDICINE | Facility: CLINIC | Age: 75
End: 2020-03-15

## 2020-03-15 DIAGNOSIS — F51.02 TRANSIENT INSOMNIA: ICD-10-CM

## 2020-03-15 DIAGNOSIS — E78.5 HYPERLIPIDEMIA WITH TARGET LDL LESS THAN 130: ICD-10-CM

## 2020-03-16 RX ORDER — SIMVASTATIN 20 MG
10 TABLET ORAL AT BEDTIME
Qty: 45 TABLET | Refills: 0 | Status: SHIPPED | OUTPATIENT
Start: 2020-03-16 | End: 2020-06-23

## 2020-03-16 RX ORDER — ZOLPIDEM TARTRATE 5 MG/1
5 TABLET ORAL
Qty: 15 TABLET | Refills: 1 | Status: SHIPPED | OUTPATIENT
Start: 2020-03-16 | End: 2021-03-04

## 2020-05-04 ENCOUNTER — TELEPHONE (OUTPATIENT)
Dept: OTOLARYNGOLOGY | Facility: CLINIC | Age: 75
End: 2020-05-04

## 2020-05-04 NOTE — TELEPHONE ENCOUNTER
"LVM to reschedule to video visit,      If pt would like to do telephone visit for 5/15  appt please confirm best number for contact. If pt would like video visit please confirm e-mail and let pt know a clinic staff member will reach out 2 days prior to help set up appointment.\"    If patient wants to reschedule to in person visit, schedule 8-12 weeks out.  "

## 2020-05-13 ENCOUNTER — TELEPHONE (OUTPATIENT)
Dept: OTOLARYNGOLOGY | Facility: CLINIC | Age: 75
End: 2020-05-13

## 2020-07-19 ENCOUNTER — HOSPITAL ENCOUNTER (OUTPATIENT)
Facility: CLINIC | Age: 75
Setting detail: OBSERVATION
Discharge: HOME | End: 2020-07-20
Attending: NURSE PRACTITIONER | Admitting: INTERNAL MEDICINE
Payer: COMMERCIAL

## 2020-07-19 ENCOUNTER — APPOINTMENT (OUTPATIENT)
Dept: GENERAL RADIOLOGY | Facility: CLINIC | Age: 75
End: 2020-07-19
Attending: NURSE PRACTITIONER
Payer: COMMERCIAL

## 2020-07-19 DIAGNOSIS — S32.592A INFERIOR PUBIC RAMUS FRACTURE, LEFT, CLOSED, INITIAL ENCOUNTER (H): ICD-10-CM

## 2020-07-19 DIAGNOSIS — S32.10XA SACRAL FRACTURE, CLOSED (H): ICD-10-CM

## 2020-07-19 DIAGNOSIS — S32.512A FRACTURE OF SUPERIOR PUBIC RAMUS, LEFT, CLOSED, INITIAL ENCOUNTER (H): ICD-10-CM

## 2020-07-19 PROBLEM — M84.350A: Status: ACTIVE | Noted: 2020-07-19

## 2020-07-19 LAB
ABO + RH BLD: NORMAL
ABO + RH BLD: NORMAL
ANION GAP SERPL CALCULATED.3IONS-SCNC: 4 MMOL/L (ref 3–14)
APTT PPP: 29 SEC (ref 22–37)
BASOPHILS # BLD AUTO: 0 10E9/L (ref 0–0.2)
BASOPHILS NFR BLD AUTO: 0.1 %
BLD GP AB SCN SERPL QL: NORMAL
BLOOD BANK CMNT PATIENT-IMP: NORMAL
BUN SERPL-MCNC: 17 MG/DL (ref 7–30)
CALCIUM SERPL-MCNC: 8.9 MG/DL (ref 8.5–10.1)
CHLORIDE SERPL-SCNC: 106 MMOL/L (ref 94–109)
CO2 SERPL-SCNC: 27 MMOL/L (ref 20–32)
CREAT SERPL-MCNC: 0.9 MG/DL (ref 0.66–1.25)
DIFFERENTIAL METHOD BLD: ABNORMAL
EOSINOPHIL # BLD AUTO: 0.1 10E9/L (ref 0–0.7)
EOSINOPHIL NFR BLD AUTO: 0.8 %
ERYTHROCYTE [DISTWIDTH] IN BLOOD BY AUTOMATED COUNT: 12.8 % (ref 10–15)
GFR SERPL CREATININE-BSD FRML MDRD: 84 ML/MIN/{1.73_M2}
GLUCOSE SERPL-MCNC: 104 MG/DL (ref 70–99)
HCT VFR BLD AUTO: 44.4 % (ref 40–53)
HGB BLD-MCNC: 14.7 G/DL (ref 13.3–17.7)
IMM GRANULOCYTES # BLD: 0.1 10E9/L (ref 0–0.4)
IMM GRANULOCYTES NFR BLD: 0.4 %
INR PPP: 1.08 (ref 0.86–1.14)
LYMPHOCYTES # BLD AUTO: 0.9 10E9/L (ref 0.8–5.3)
LYMPHOCYTES NFR BLD AUTO: 5.9 %
MCH RBC QN AUTO: 30.3 PG (ref 26.5–33)
MCHC RBC AUTO-ENTMCNC: 33.1 G/DL (ref 31.5–36.5)
MCV RBC AUTO: 92 FL (ref 78–100)
MONOCYTES # BLD AUTO: 1 10E9/L (ref 0–1.3)
MONOCYTES NFR BLD AUTO: 6.6 %
NEUTROPHILS # BLD AUTO: 13 10E9/L (ref 1.6–8.3)
NEUTROPHILS NFR BLD AUTO: 86.2 %
NRBC # BLD AUTO: 0 10*3/UL
NRBC BLD AUTO-RTO: 0 /100
PLATELET # BLD AUTO: 229 10E9/L (ref 150–450)
POTASSIUM SERPL-SCNC: 4.3 MMOL/L (ref 3.4–5.3)
RBC # BLD AUTO: 4.85 10E12/L (ref 4.4–5.9)
SODIUM SERPL-SCNC: 137 MMOL/L (ref 133–144)
SPECIMEN EXP DATE BLD: NORMAL
WBC # BLD AUTO: 15.1 10E9/L (ref 4–11)

## 2020-07-19 PROCEDURE — 25000132 ZZH RX MED GY IP 250 OP 250 PS 637: Performed by: INTERNAL MEDICINE

## 2020-07-19 PROCEDURE — 85610 PROTHROMBIN TIME: CPT | Performed by: NURSE PRACTITIONER

## 2020-07-19 PROCEDURE — 93005 ELECTROCARDIOGRAM TRACING: CPT

## 2020-07-19 PROCEDURE — 99285 EMERGENCY DEPT VISIT HI MDM: CPT | Mod: 25

## 2020-07-19 PROCEDURE — 73502 X-RAY EXAM HIP UNI 2-3 VIEWS: CPT

## 2020-07-19 PROCEDURE — 86850 RBC ANTIBODY SCREEN: CPT | Performed by: NURSE PRACTITIONER

## 2020-07-19 PROCEDURE — 25000132 ZZH RX MED GY IP 250 OP 250 PS 637: Performed by: NURSE PRACTITIONER

## 2020-07-19 PROCEDURE — 85025 COMPLETE CBC W/AUTO DIFF WBC: CPT | Performed by: NURSE PRACTITIONER

## 2020-07-19 PROCEDURE — 68300005 ZZH TRAUMA EVALUATION W/O CC LEVEL III

## 2020-07-19 PROCEDURE — G0378 HOSPITAL OBSERVATION PER HR: HCPCS

## 2020-07-19 PROCEDURE — 80048 BASIC METABOLIC PNL TOTAL CA: CPT | Performed by: NURSE PRACTITIONER

## 2020-07-19 PROCEDURE — 85730 THROMBOPLASTIN TIME PARTIAL: CPT | Performed by: NURSE PRACTITIONER

## 2020-07-19 PROCEDURE — 99219 ZZC INITIAL OBSERVATION CARE,LEVL II: CPT | Performed by: INTERNAL MEDICINE

## 2020-07-19 PROCEDURE — 86900 BLOOD TYPING SEROLOGIC ABO: CPT | Performed by: NURSE PRACTITIONER

## 2020-07-19 PROCEDURE — 86901 BLOOD TYPING SEROLOGIC RH(D): CPT | Performed by: NURSE PRACTITIONER

## 2020-07-19 RX ORDER — HYDROMORPHONE HYDROCHLORIDE 1 MG/ML
0.2 INJECTION, SOLUTION INTRAMUSCULAR; INTRAVENOUS; SUBCUTANEOUS
Status: DISCONTINUED | OUTPATIENT
Start: 2020-07-19 | End: 2020-07-20 | Stop reason: HOSPADM

## 2020-07-19 RX ORDER — MONTELUKAST SODIUM 10 MG/1
10 TABLET ORAL AT BEDTIME
COMMUNITY
End: 2021-03-03

## 2020-07-19 RX ORDER — MONTELUKAST SODIUM 10 MG/1
10 TABLET ORAL AT BEDTIME
Status: DISCONTINUED | OUTPATIENT
Start: 2020-07-19 | End: 2020-07-20 | Stop reason: HOSPADM

## 2020-07-19 RX ORDER — ACETAMINOPHEN 325 MG/1
650 TABLET ORAL EVERY 4 HOURS PRN
Status: DISCONTINUED | OUTPATIENT
Start: 2020-07-19 | End: 2020-07-20 | Stop reason: HOSPADM

## 2020-07-19 RX ORDER — HYDROCODONE BITARTRATE AND ACETAMINOPHEN 5; 325 MG/1; MG/1
1 TABLET ORAL ONCE
Status: COMPLETED | OUTPATIENT
Start: 2020-07-19 | End: 2020-07-19

## 2020-07-19 RX ORDER — ACETAMINOPHEN 650 MG/1
650 SUPPOSITORY RECTAL EVERY 4 HOURS PRN
Status: DISCONTINUED | OUTPATIENT
Start: 2020-07-19 | End: 2020-07-20 | Stop reason: HOSPADM

## 2020-07-19 RX ORDER — ZOLPIDEM TARTRATE 5 MG/1
5 TABLET ORAL
Status: DISCONTINUED | OUTPATIENT
Start: 2020-07-19 | End: 2020-07-20 | Stop reason: HOSPADM

## 2020-07-19 RX ORDER — NALOXONE HYDROCHLORIDE 0.4 MG/ML
.1-.4 INJECTION, SOLUTION INTRAMUSCULAR; INTRAVENOUS; SUBCUTANEOUS
Status: DISCONTINUED | OUTPATIENT
Start: 2020-07-19 | End: 2020-07-20 | Stop reason: HOSPADM

## 2020-07-19 RX ORDER — ONDANSETRON 2 MG/ML
4 INJECTION INTRAMUSCULAR; INTRAVENOUS EVERY 6 HOURS PRN
Status: DISCONTINUED | OUTPATIENT
Start: 2020-07-19 | End: 2020-07-20 | Stop reason: HOSPADM

## 2020-07-19 RX ORDER — ONDANSETRON 4 MG/1
4 TABLET, ORALLY DISINTEGRATING ORAL EVERY 6 HOURS PRN
Status: DISCONTINUED | OUTPATIENT
Start: 2020-07-19 | End: 2020-07-20 | Stop reason: HOSPADM

## 2020-07-19 RX ORDER — HYDROCODONE BITARTRATE AND ACETAMINOPHEN 5; 325 MG/1; MG/1
1-2 TABLET ORAL EVERY 4 HOURS PRN
Status: DISCONTINUED | OUTPATIENT
Start: 2020-07-19 | End: 2020-07-20 | Stop reason: HOSPADM

## 2020-07-19 RX ORDER — ALBUTEROL SULFATE 90 UG/1
2 AEROSOL, METERED RESPIRATORY (INHALATION) EVERY 6 HOURS PRN
Status: DISCONTINUED | OUTPATIENT
Start: 2020-07-19 | End: 2020-07-20 | Stop reason: HOSPADM

## 2020-07-19 RX ADMIN — HYDROCODONE BITARTRATE AND ACETAMINOPHEN 1 TABLET: 5; 325 TABLET ORAL at 15:42

## 2020-07-19 RX ADMIN — MONTELUKAST 10 MG: 10 TABLET, FILM COATED ORAL at 21:25

## 2020-07-19 RX ADMIN — HYDROCODONE BITARTRATE AND ACETAMINOPHEN 1 TABLET: 5; 325 TABLET ORAL at 19:17

## 2020-07-19 RX ADMIN — ACETAMINOPHEN 650 MG: 325 TABLET, FILM COATED ORAL at 21:25

## 2020-07-19 ASSESSMENT — MIFFLIN-ST. JEOR
SCORE: 1268.36
SCORE: 1252.03

## 2020-07-19 ASSESSMENT — ENCOUNTER SYMPTOMS
NECK PAIN: 0
HEADACHES: 0
ARTHRALGIAS: 1
WOUND: 1
NUMBNESS: 0

## 2020-07-19 NOTE — PLAN OF CARE
PRIOR TO DISCHARGE      Comments: -diagnostic tests and consults completed and resulted- Not met  -vital signs normal or at patient baseline- Met  Nurse to notify provider when observation goals have been met and patient is ready for discharge.   A&O x4. CMS intact. Tylenol or Norco available for pain. Will continue to monitor.
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
## 2020-07-19 NOTE — PROGRESS NOTES
RECEIVING UNIT ED HANDOFF REVIEW    ED Nurse Handoff Report was reviewed by: Jitendra Wiggins on July 19, 2020 at 4:38 PM

## 2020-07-19 NOTE — PHARMACY-ADMISSION MEDICATION HISTORY
Pharmacy Medication History  Admission medication history interview status for the 7/19/2020  admission is complete. See EPIC admission navigator for prior to admission medications     Medication history sources: Patient  Medication history source reliability: Good  Adherence assessment: Good    Significant changes made to the medication list:  Added Breo ellipta, Singulair, and vit D   Removed Advair, Aspirin, and Malarone     Additional medication history information:   Patient does not know how many units of Vit D he takes.     Medication reconciliation completed by provider prior to medication history? No    Time spent in this activity: 15 minutes       Prior to Admission medications    Medication Sig Last Dose Taking? Auth Provider   albuterol (PROAIR HFA, PROVENTIL HFA, VENTOLIN HFA) 108 (90 BASE) MCG/ACT inhaler Inhale 2 puffs into the lungs every 6 hours as needed for shortness of breath / dyspnea or wheezing  at PRN Yes Lux Juarez MD   fluticasone (FLONASE) 50 MCG/ACT spray Spray 1 spray into both nostrils as needed for rhinitis or allergies  at PRN Yes Reported, Patient   fluticasone-vilanterol (BREO ELLIPTA) 200-25 MCG/INH inhaler Inhale 1 puff into the lungs daily 7/19/2020 at Unknown time Yes Unknown, Entered By History   montelukast (SINGULAIR) 10 MG tablet Take 10 mg by mouth At Bedtime 7/18/2020 at Unknown time Yes Unknown, Entered By History   simvastatin (ZOCOR) 20 MG tablet Take 0.5 tablets (10 mg) by mouth At Bedtime 7/18/2020 at Unknown time Yes Russell Alcantara MD   VITAMIN D PO Take 1 tablet by mouth daily 7/18/2020 at Unknown time Yes Unknown, Entered By History   zolpidem (AMBIEN) 5 MG tablet Take 1 tablet (5 mg) by mouth nightly as needed for sleep  at PRN  Yes Khris Lane MD Elham Said   Student Pharmacist

## 2020-07-20 ENCOUNTER — APPOINTMENT (OUTPATIENT)
Dept: PHYSICAL THERAPY | Facility: CLINIC | Age: 75
End: 2020-07-20
Attending: INTERNAL MEDICINE
Payer: COMMERCIAL

## 2020-07-20 VITALS
HEIGHT: 64 IN | DIASTOLIC BLOOD PRESSURE: 57 MMHG | TEMPERATURE: 97.5 F | BODY MASS INDEX: 22.43 KG/M2 | OXYGEN SATURATION: 93 % | WEIGHT: 131.4 LBS | RESPIRATION RATE: 16 BRPM | SYSTOLIC BLOOD PRESSURE: 107 MMHG | HEART RATE: 81 BPM

## 2020-07-20 LAB — INTERPRETATION ECG - MUSE: NORMAL

## 2020-07-20 PROCEDURE — 99217 ZZC OBSERVATION CARE DISCHARGE: CPT | Performed by: PHYSICIAN ASSISTANT

## 2020-07-20 PROCEDURE — 97530 THERAPEUTIC ACTIVITIES: CPT | Mod: GP | Performed by: PHYSICAL THERAPIST

## 2020-07-20 PROCEDURE — 25000132 ZZH RX MED GY IP 250 OP 250 PS 637: Performed by: INTERNAL MEDICINE

## 2020-07-20 PROCEDURE — 97161 PT EVAL LOW COMPLEX 20 MIN: CPT | Mod: GP | Performed by: PHYSICAL THERAPIST

## 2020-07-20 PROCEDURE — 97116 GAIT TRAINING THERAPY: CPT | Mod: GP | Performed by: PHYSICAL THERAPIST

## 2020-07-20 PROCEDURE — G0378 HOSPITAL OBSERVATION PER HR: HCPCS

## 2020-07-20 RX ORDER — IBUPROFEN 400 MG/1
400 TABLET, FILM COATED ORAL EVERY 6 HOURS PRN
COMMUNITY
Start: 2020-07-20 | End: 2022-11-21

## 2020-07-20 RX ORDER — ACETAMINOPHEN 325 MG/1
650 TABLET ORAL EVERY 4 HOURS PRN
COMMUNITY
Start: 2020-07-20 | End: 2020-12-10

## 2020-07-20 RX ORDER — IBUPROFEN 400 MG/1
400 TABLET, FILM COATED ORAL EVERY 6 HOURS PRN
Status: DISCONTINUED | OUTPATIENT
Start: 2020-07-20 | End: 2020-07-20 | Stop reason: HOSPADM

## 2020-07-20 RX ORDER — HYDROCODONE BITARTRATE AND ACETAMINOPHEN 5; 325 MG/1; MG/1
1-2 TABLET ORAL EVERY 4 HOURS PRN
Qty: 20 TABLET | Refills: 0 | Status: SHIPPED | OUTPATIENT
Start: 2020-07-20 | End: 2020-12-10

## 2020-07-20 RX ADMIN — HYDROCODONE BITARTRATE AND ACETAMINOPHEN 2 TABLET: 5; 325 TABLET ORAL at 11:47

## 2020-07-20 RX ADMIN — FLUTICASONE FUROATE AND VILANTEROL TRIFENATATE 1 PUFF: 200; 25 POWDER RESPIRATORY (INHALATION) at 07:43

## 2020-07-20 RX ADMIN — HYDROCODONE BITARTRATE AND ACETAMINOPHEN 1 TABLET: 5; 325 TABLET ORAL at 07:26

## 2020-07-20 RX ADMIN — ACETAMINOPHEN 650 MG: 325 TABLET, FILM COATED ORAL at 04:24

## 2020-07-20 RX ADMIN — HYDROCODONE BITARTRATE AND ACETAMINOPHEN 1 TABLET: 5; 325 TABLET ORAL at 00:04

## 2020-07-20 NOTE — CONSULTS
"Social Work consult for \"discharge planning\" will be completed as the recommendation is for patient to discharge home with home PT and OT. Care Coordinator will be following up with patient for these needs.     APOLONIA Bates    [facility]    "

## 2020-07-20 NOTE — DISCHARGE INSTRUCTIONS
Do not exceed more than 3000 mg of tylenol/acetaminophen from all sources in a 24 hr period. Each Norco has 325 mg/pill.  OTC Tylenol is either 325 mg or 500 mg depending the strength.       HOMECARE NOTE:   Your doctor has ordered home care to help you after your hospital stay.  The staff will contact you to schedule your first visit.  This service will be provided by [facility].  If you have any question, or have not received a call within 48 hours of discharge, please call them at [phone] or [phone].  *please see homecare quality ratings for all homecares in your area at www.medicare.gov
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
## 2020-07-20 NOTE — PROGRESS NOTES
PRIOR TO DISCHARGE      Comments: -diagnostic tests and consults completed and resulted- Not met  -vital signs normal or at patient baseline- Met  Nurse to notify provider when observation goals have been met and patient is ready for discharge.     Pt A&O, VSS on RA. CMS intact. IV SL. Voiding adequate in urinal. Offered to stand/ambulate, pt declined. Offer repositioning. Norco and tylenol for pain.

## 2020-07-20 NOTE — PROGRESS NOTES
Observation goals:  -diagnostic tests and consults completed and resulted: met  -vital signs normal or at patient baseline: met    Pt A&O X4. VSS on RA. Up with A1 ww. Norco for pain. Reviewed AVS with pt and spouse, discharging home with home PT/OT.

## 2020-07-20 NOTE — PLAN OF CARE
Pt A&Ox4, VSS on RA. Pt remained in bed this shift, voiding via urinal. Pt slightly [place], L hearing aid removed overnight. Abrasions to L forehead, knee, and elbow. CMS intact, pain controlled with PRN Norco x1 and PRN Tylenol x1. Discharge pending PT and formal ortho consult. Will continue to follow plan of care.

## 2020-07-20 NOTE — PROGRESS NOTES
Observation goals prior to discharge:     - Diagnostic tests and consults completed and resulted: Not met, ortho consult in morning   - Vital signs normal or at patient baseline: Met     Nurse to notify provider when observation goals have been met and patient is ready for discharge.
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
## 2020-07-20 NOTE — CONSULTS
Care Transition Initial Assessment - RN        Met with: Patient and Caregiver.  DATA   Active Problems:    Stress fracture of left pubic ramus       Cognitive Status: awake, alert and oriented.        Contact information and PCP information verified: Yes  Lives With: spouse   Living Arrangements: house                 Insurance concerns: No Insurance issues identified  ASSESSMENT  Patient currently receives the following services:  none        Identified issues/concerns regarding health management: fx rami, pain mild-severe with need for assist and new FWW.  Pt will need home PT/OT to recover/gain strength    PLAN  Financial costs for the patient include TBD/insurance .  Patient given options and choices for discharge Yes, informed of options/medicare .gov .  Pt and wife elected to use [facility] for PT and OT  Patient/family is agreeable to the plan?  Yes:   Patient anticipates discharging to home .        Patient anticipates needs for home equipment: Yes, a FWW has been ordered for pt and he will receive this prior to discharge today  Transportation/person available to transport on day of discharge  is his wife, who is here with patient.  Plan/Disposition: Home   Appointments:  Pt will make his follow up appointment as instructed in AVS      Care  (CTS) will continue to follow as needed.    SHANNON Arriaza [facility]  Inpatient Care Coordinator  M: [phone]
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
## 2020-07-21 ENCOUNTER — TELEPHONE (OUTPATIENT)
Dept: FAMILY MEDICINE | Facility: CLINIC | Age: 75
End: 2020-07-21

## 2020-07-21 NOTE — TELEPHONE ENCOUNTER
Hospital F/U 7/20/2020 DX:Inferior Pubic Ramus Fracture, Left, Closed, Initial Encounter (H) ED/IP 0/0    [phone] (home)

## 2020-07-21 NOTE — TELEPHONE ENCOUNTER
Hospital/TCU/ED for chronic condition Discharge Protocol    Following up with [facility] in one month.    Bhavya Vieira RN

## 2020-07-31 ENCOUNTER — TELEPHONE (OUTPATIENT)
Dept: FAMILY MEDICINE | Facility: CLINIC | Age: 75
End: 2020-07-31

## 2020-08-04 DIAGNOSIS — Z53.9 DIAGNOSIS NOT YET DEFINED: Primary | ICD-10-CM

## 2020-08-04 PROCEDURE — G0180 MD CERTIFICATION HHA PATIENT: HCPCS | Performed by: FAMILY MEDICINE

## 2020-08-14 ENCOUNTER — MEDICAL CORRESPONDENCE (OUTPATIENT)
Dept: HEALTH INFORMATION MANAGEMENT | Facility: CLINIC | Age: 75
End: 2020-08-14

## 2020-08-14 DIAGNOSIS — Z76.89 HEALTH CARE HOME: Primary | ICD-10-CM

## 2020-08-17 ENCOUNTER — PATIENT OUTREACH (OUTPATIENT)
Dept: CARE COORDINATION | Facility: CLINIC | Age: 75
End: 2020-08-17

## 2020-08-17 NOTE — LETTER
[facility] CARE COORDINATION  [facility]  [address] [place]  [address]  [phone]    August 18, 2020    Bear Guido  [facility] [facility] [address]      Dear Bear,    I am a clinic community health worker who works with Russell Alcantara MD at [facility]. I have been trying to reach you recently to introduce Clinic Care Coordination and to see if there was anything I could assist you with.  I recently tried to call and was unable to reach you. Below is a description of clinic care coordination and how I can further assist you.      The clinic care coordination team is made up of a registered nurse,  and community health worker who understand the health care system. The goal of clinic care coordination is to help you manage your health and improve access to the health care system in the most efficient manner. The team can assist you in meeting your health care goals by providing education, coordinating services, strengthening the communication among your providers and supporting you with any resource needs.    Please feel free to contact the Community Health Worker at [phone] with any questions or concerns. We are focused on providing you with the highest-quality healthcare experience possible and that all starts with you.     Sincerely,     Stephany Felton   Community Health Worker   Ph: [phone]  Fx: [phone]

## 2020-08-17 NOTE — PROGRESS NOTES
Clinic Care Coordination Contact  [facility]/Voicemail       Clinical Data: Care Coordinator Outreach  Outreach attempted x 1.  Left message on patient's voicemail with call back information and requested return call.  Plan. Care Coordinator will try to reach patient again in 1-2 business days

## 2020-08-18 ENCOUNTER — THERAPY VISIT (OUTPATIENT)
Dept: PHYSICAL THERAPY | Facility: CLINIC | Age: 75
End: 2020-08-18
Payer: COMMERCIAL

## 2020-08-18 DIAGNOSIS — S32.592A: ICD-10-CM

## 2020-08-18 PROCEDURE — 97161 PT EVAL LOW COMPLEX 20 MIN: CPT | Mod: GP

## 2020-08-18 PROCEDURE — 97110 THERAPEUTIC EXERCISES: CPT | Mod: GP

## 2020-08-18 ASSESSMENT — ACTIVITIES OF DAILY LIVING (ADL)
WALKING_15_MINUTES_OR_GREATER: EXTREME DIFFICULTY
WALKING_APPROXIMATELY_10_MINUTES: SLIGHT DIFFICULTY
HOS_ADL_SCORE(%): 61.76
WALKING_DOWN_STEEP_HILLS: SLIGHT DIFFICULTY
STEPPING_UP_AND_DOWN_CURBS: NO DIFFICULTY AT ALL
HOS_ADL_COUNT: 17
DEEP_SQUATTING: EXTREME DIFFICULTY
ROLLING_OVER_IN_BED: SLIGHT DIFFICULTY
HOS_ADL_HIGHEST_POTENTIAL_SCORE: 68
HEAVY_WORK: MODERATE DIFFICULTY
GOING_DOWN_1_FLIGHT_OF_STAIRS: NO DIFFICULTY AT ALL
HOS_ADL_ITEM_SCORE_TOTAL: 42
SITTING_FOR_15_MINUTES: MODERATE DIFFICULTY
STANDING_FOR_15_MINUTES: MODERATE DIFFICULTY
WALKING_UP_STEEP_HILLS: MODERATE DIFFICULTY
LIGHT_TO_MODERATE_WORK: SLIGHT DIFFICULTY
GETTING_INTO_AND_OUT_OF_AN_AVERAGE_CAR: SLIGHT DIFFICULTY
RECREATIONAL_ACTIVITIES: EXTREME DIFFICULTY
GETTING_INTO_AND_OUT_OF_A_BATHTUB: SLIGHT DIFFICULTY
WALKING_INITIALLY: NO DIFFICULTY AT ALL
PUTTING_ON_SOCKS_AND_SHOES: EXTREME DIFFICULTY
TWISTING/PIVOTING_ON_INVOLVED_LEG: EXTREME DIFFICULTY
GOING_UP_1_FLIGHT_OF_STAIRS: MODERATE DIFFICULTY
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
## 2020-08-18 NOTE — PROGRESS NOTES
Clinic Care Coordination Contact  [facility]/Voicemail       Clinical Data: Care Coordinator Outreach  Outreach attempted x 2.  Left message on patient's voicemail with call back information and requested return call.  Plan: Care Coordinator will send care coordination introduction letter with care coordinator contact information and explanation of care coordination services via [employer]. Care Coordinator will do no further outreaches at this time.

## 2020-08-27 ENCOUNTER — THERAPY VISIT (OUTPATIENT)
Dept: PHYSICAL THERAPY | Facility: CLINIC | Age: 75
End: 2020-08-27
Payer: COMMERCIAL

## 2020-08-27 DIAGNOSIS — S32.592A CLOSED FRACTURE OF RAMUS OF LEFT PUBIS, INITIAL ENCOUNTER (H): ICD-10-CM

## 2020-08-27 PROCEDURE — 97112 NEUROMUSCULAR REEDUCATION: CPT | Mod: GP | Performed by: PHYSICAL THERAPIST

## 2020-08-27 PROCEDURE — 97110 THERAPEUTIC EXERCISES: CPT | Mod: GP | Performed by: PHYSICAL THERAPIST

## 2020-08-31 ENCOUNTER — OFFICE VISIT (OUTPATIENT)
Dept: FAMILY MEDICINE | Facility: CLINIC | Age: 75
End: 2020-08-31
Payer: COMMERCIAL

## 2020-08-31 VITALS
HEIGHT: 64 IN | OXYGEN SATURATION: 100 % | RESPIRATION RATE: 16 BRPM | TEMPERATURE: 98.4 F | SYSTOLIC BLOOD PRESSURE: 133 MMHG | BODY MASS INDEX: 23.44 KG/M2 | HEART RATE: 68 BPM | DIASTOLIC BLOOD PRESSURE: 67 MMHG | WEIGHT: 137.3 LBS
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
## 2020-08-31 DIAGNOSIS — S40.861A INSECT BITE OF RIGHT UPPER EXTREMITY, INITIAL ENCOUNTER: Primary | ICD-10-CM

## 2020-08-31 PROCEDURE — 36415 COLL VENOUS BLD VENIPUNCTURE: CPT | Performed by: PHYSICIAN ASSISTANT

## 2020-08-31 PROCEDURE — 86618 LYME DISEASE ANTIBODY: CPT | Performed by: PHYSICIAN ASSISTANT

## 2020-08-31 PROCEDURE — 99213 OFFICE O/P EST LOW 20 MIN: CPT | Performed by: PHYSICIAN ASSISTANT

## 2020-08-31 ASSESSMENT — MIFFLIN-ST. JEOR: SCORE: 1273.79

## 2020-08-31 NOTE — PROGRESS NOTES
"Subjective     Bear Guido is a 75 year old male who presents to clinic today for the following health issues:    HPI       Patient has possible tick bite on his upper Right arm-    74 y/o male here for evaluation of what he thinks have been insect bite, wonders about tick/lyme.  Was up in [place] visiting friends two weekends ago, and noticed a spot on her right upper arm.  He does have picture that shows more of an area of ecchymosis, but could be bite.  Since then, he has felt just fine, no fevers, chills, body aches.  Lesions has faded.  Did not see insect, nor remove tick.    Review of Systems   Constitutional, HEENT, cardiovascular, pulmonary, gi and gu systems are negative, except as otherwise noted.      Objective    /67   Pulse 68   Temp 98.4  F (36.9  C) (Oral)   Resp 16   Ht 1.626 m (5' 4\")   Wt 62.3 kg (137 lb 4.8 oz)   SpO2 100%   BMI 23.57 kg/m    Body mass index is 23.57 kg/m .  Physical Exam   GENERAL: alert and no distress  EYES: Eyes grossly normal to inspection  RESP: lungs clear to auscultation - no rales, rhonchi or wheezes  CV: regular rate and rhythm, normal S1 S2, no S3 or S4, no murmur, click or rub, no peripheral edema and peripheral pulses strong  SKIN: small area of fading ecchymosis over right upper arm.    No results found for this or any previous visit (from the past 24 hour(s)).        Assessment & Plan     Insect bite of right upper extremity, initial encounter  Not classic erythema migrans, and is asymptomatic.  Will screen.  - Lyme Disease Maggie with reflex to WB Serum           Return in about 6 months (around 2/28/2021).    Norm Tenorio PA-C  [facility]    "

## 2020-09-01 LAB — B BURGDOR IGG+IGM SER QL: 0.05 (ref 0–0.89)

## 2020-09-01 NOTE — RESULT ENCOUNTER NOTE
Dear Bear    Your test results are attached, feel free to contact me via [employer]     Good news, your lyme test is negative.  If you start to have any body aches, fevers, or chills, please let me know.  Otherwise, I do not think anything needs to be done.    Hal Tenorio PA-C

## 2020-10-01 NOTE — PROGRESS NOTES
10/02/20  Neurotology Clinic Note    I had the pleasure of meeting Mr. Guido today in the neurotology clinic.  He is a 75-year-old man who is previously been a patient of my colleague Dr. Phillips, who has since retired.    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  CT demonstrates that he has had a canal plasty performed on the right and his ossicular chain including the stapes appears to still be in place.  He recalls having normal hearing before that surgery and normal hearing after that surgery and thus one suspects that the surgery was done for a different reason. That surgery was performed 30 to 35 years ago through [facility] and, about 15 years after that surgery, he suffered a right profound sudden sensorineural hearing loss accompanied by a vestibular crisis event. He had an infection at the time.  It took about a year for him to achieve vestibular compensation and he never recovered hearing in that ear.      He meaghan suffered a left sudden sensorineural hearing loss in 2014 during a head cold.  He was treated with steroids but did not achieve significant hearing improvement.  This remains his better hearing ear by far and he uses a BiCROS system.     He did not undergo MRI following the sudden loss in 2014, nor previously.      Past Medical History:   Diagnosis Date     Deafness in right ear 2002     HL (hearing loss)     Left     Ringing in ears      Tachycardia      Uncomplicated asthma      Patient Active Problem List   Diagnosis     Hyperlipidemia with target LDL less than 130     Moderate persistent asthma     Allergic rhinitis     Traumatic myositis ossificans     Vitreous degeneration     History of hematuria     Hyperglycemia     Transient insomnia     Tachycardia     Chest pain     SVT (supraventricular tachycardia) (H)     Stress fracture of left pubic ramus     Closed fracture of ramus of left pubis (H)        Past Surgical History:   Procedure Laterality Date     COLONOSCOPY N/A 10/10/2017    Procedure: COLONOSCOPY;  colonoscopy;  Surgeon: Ervin Irizarry MD;  Location:  GI     ENT SURGERY  1979    ear surgery     Current Outpatient Medications   Medication     acetaminophen (TYLENOL) 325 MG tablet     albuterol (PROAIR HFA, PROVENTIL HFA, VENTOLIN HFA) 108 (90 BASE) MCG/ACT inhaler     ciprofloxacin-dexamethasone (CIPRODEX) 0.3-0.1 % otic suspension     fluticasone (FLONASE) 50 MCG/ACT spray     fluticasone-vilanterol (BREO ELLIPTA) 200-25 MCG/INH inhaler     HYDROcodone-acetaminophen (NORCO) 5-325 MG tablet     ibuprofen (ADVIL/MOTRIN) 400 MG tablet     montelukast (SINGULAIR) 10 MG tablet     simvastatin (ZOCOR) 20 MG tablet     VITAMIN D PO     zolpidem (AMBIEN) 5 MG tablet     No current facility-administered medications for this visit.         Allergies   Allergen Reactions     Cyclobenzaprine Unknown     Flushing     Seasonal Allergies      Spring and fall allergies     Family History   Problem Relation Age of Onset     High cholesterol Mother      Allergies Mother      Cancer Father 62        Lung cancer as a result of smoking     Asthma Father      High cholesterol Father      Heart Disease Maternal Grandfather      Cerebrovascular Disease Maternal Grandfather      Heart Disease Paternal Grandmother      Heart Disease Paternal Grandfather      Social History:  Social History     Tobacco Use     Smoking status: Never Smoker     Smokeless tobacco: Never Used   Substance Use Topics     Alcohol use: Yes     Alcohol/week: 0.0 standard drinks     Comment: 1-2 galsses most days     Drug use: No   I met the patient and his wife, who is also now a patient of mine, in line for the bus for the [place] [employer].      ROS:   Patient Supplied Answers to Review of Systems  [facility] ROS 10/2/2020   Constitutional -   Neurology -   Ears, Nose, Throat Ringing/noise in ears   Cardiopulmonary -   Gastrointestinal/Genitourinary -  "  Musculoskeletal -   Allergy/Immunology -     Physical Examination:  /80   Pulse 74   Temp 97.9  F (36.6  C)   Resp 16   Ht 1.626 m (5' 4\")   Wt 62.6 kg (138 lb)   SpO2 98%   BMI 23.69 kg/m    General: The patient was unaccompanied, well-groomed, and in no distress  Respiratory: He was breathing comfortably without stridor or stertor or coughing  Neurologic: He is alert and oriented x3.  Facial nerve function is normal bilaterally.  Voice quality is normal.  Eyes: Extraocular motility was intact.  No nystagmus.  Ears: Auricles are normal bilaterally.  Ear microscope exam:  Left ear: Ear canal lined with healthy skin with a moderate amount of cerumen that was removed with a curette.  Tympanic membrane is intact and mobile with an aerated middle ear space.  Right ear: There is been a canal plasty performed and skin lines the widened canal completely.  The inferior aspect of the canal has been excavated or he could have previously had a canal cholesteatoma or keratosis obturans, as there is a large excavation inferior to the inferior annulus.  In this crevice, there was impacted debris with a bright green overcoat.  This was elevated with a right angle from the sulcus and from the surface of the tympanic membrane and back of the TMJ.  Underneath this there was moist mucopurulent debris which was suctioned clear.  At the very base of this along the most medial aspect of the posterior inferior ear canal, is a small ulceration or protuberance of granulation with a central indentation.  It did not bleed but rather was weeping.  The skin around this appeared healthy and the entire thing is about the width of a cerumen loop.    Audiogram:  Left PTA 43dB, 88% WRS      CT temporal bone, 2014, images reviewed  While the CT shows that the patient has indeed had ear surgery on the right side, characterized by a canal plasty that entered the mastoid air cells, the stapes superstructure appears to be intact and I see " no evidence of prosthesis.  Thus, it is not clear what kind of surgery he had and there is not overt evidence of otosclerosis.  Left temporal bone is normal.  Internal auditory canals appear fairly symmetric.    Impression and plan:  1.  Right profound sudden sensorineural hearing loss and vestibular crisis, onset approximately 15 years ago.  2.  Left sudden sensorineural hearing loss, 2014.    I recommend that the patient undergo an MRI scan with gadolinium with attention to the internal auditory canals.  He has not had prior evaluation for retrocochlear lesions following his sudden hearing losses and thus this is indicated, particularly with the bilateral sequential losses.  For now the left ear hearing loss is amenable to amplification.  I would recommend that he return for serial audiometry every 1 to 2 years, and sooner if he notes no changes of course.    3.  Ulceration versus granulation and floor of right external auditory canal, in canal that has previously undergone canal plasty.    The indications for his prior surgery are not completely clear given that he had normal hearing prior to the surgery and there is no evidence of otosclerosis on his CT scan.  The stapes is also in place and the middle ear space looks undisturbed.  Perhaps he had a canal cholesteatoma or other canal pathology.  In any event, this ear has drained periodically over the years and was collecting debris today.  We will first treat the inflammatory tissue with Ciprodex for 2 weeks to see if this will calm this area down.  I will reinspect it when he returns the clinic and we will consider biopsy of the area and treatment with cautery agent based on its response.  I explained that small skin cancers can arise in chronically inflamed ears over many years, or this could be an inflammatory effect of his prior surgery.    Plan:  1.  MRI IAC protocol  2.  Ciprodex to right ear, 6 drops twice daily x14 days  3.  Follow-up with in clinic  appointment after completion of imaging and topical therapy    Sonja Thornton MD  Otology & Neurotology  [facility]

## 2020-10-02 ENCOUNTER — OFFICE VISIT (OUTPATIENT)
Dept: AUDIOLOGY | Facility: CLINIC | Age: 75
End: 2020-10-02
Payer: COMMERCIAL

## 2020-10-02 ENCOUNTER — OFFICE VISIT (OUTPATIENT)
Dept: OTOLARYNGOLOGY | Facility: CLINIC | Age: 75
End: 2020-10-02
Payer: COMMERCIAL

## 2020-10-02 VITALS
WEIGHT: 138 LBS | BODY MASS INDEX: 23.56 KG/M2 | DIASTOLIC BLOOD PRESSURE: 80 MMHG | TEMPERATURE: 97.9 F | HEART RATE: 74 BPM | RESPIRATION RATE: 16 BRPM | SYSTOLIC BLOOD PRESSURE: 134 MMHG | HEIGHT: 64 IN | OXYGEN SATURATION: 98 %

## 2020-10-02 DIAGNOSIS — H92.11 OTORRHEA, RIGHT: ICD-10-CM

## 2020-10-02 DIAGNOSIS — H61.91 LESION OF EXTERNAL EAR CANAL, RIGHT: ICD-10-CM

## 2020-10-02 DIAGNOSIS — H90.3 ASYMMETRICAL SENSORINEURAL HEARING LOSS: ICD-10-CM

## 2020-10-02 DIAGNOSIS — H90.3 BILATERAL SENSORINEURAL HEARING LOSS: Primary | ICD-10-CM

## 2020-10-02 DIAGNOSIS — H90.3 BILATERAL SENSORINEURAL HEARING LOSS: ICD-10-CM

## 2020-10-02 PROCEDURE — 92567 TYMPANOMETRY: CPT | Mod: 52 | Performed by: AUDIOLOGIST

## 2020-10-02 PROCEDURE — 99215 OFFICE O/P EST HI 40 MIN: CPT | Mod: 25 | Performed by: OTOLARYNGOLOGY

## 2020-10-02 PROCEDURE — 92504 EAR MICROSCOPY EXAMINATION: CPT | Performed by: OTOLARYNGOLOGY

## 2020-10-02 PROCEDURE — 92557 COMPREHENSIVE HEARING TEST: CPT | Mod: 52 | Performed by: AUDIOLOGIST

## 2020-10-02 RX ORDER — CIPROFLOXACIN AND DEXAMETHASONE 3; 1 MG/ML; MG/ML
SUSPENSION/ DROPS AURICULAR (OTIC)
Qty: 7.5 ML | Refills: 0 | Status: SHIPPED | OUTPATIENT
Start: 2020-10-02 | End: 2020-10-16

## 2020-10-02 ASSESSMENT — PAIN SCALES - GENERAL: PAINLEVEL: NO PAIN (0)

## 2020-10-02 ASSESSMENT — MIFFLIN-ST. JEOR: SCORE: 1276.96

## 2020-10-02 NOTE — PATIENT INSTRUCTIONS
1. You were seen in the ENT Clinic today by .  If you have any questions or concerns after your appointment, please call   - Option 1: ENT Clinic: [phone]  - Option 2: Taryn (' Nurse): [phone]    2.   Plan to return to clinic after you have your MRI done.     SHANNON Centeno  Care Coordinator  [facility] Otolaryngology  [phone]

## 2020-10-02 NOTE — LETTER
10/2/2020       RE: Bear Guido  [address] [address]     Dear Colleague,    Thank you for referring your patient, Bear Guido, to the [facility] [place] [facility]. Please see a copy of my visit note below.    10/02/20  Neurotology Clinic Note    I had the pleasure of meeting Mr. Guido today in the neurotology clinic.  He is a 75-year-old man who is previously been a patient of my colleague Dr. Phillips, who has since retired.    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  CT demonstrates that he has had a canal plasty performed on the right and his ossicular chain including the stapes appears to still be in place.  He recalls having normal hearing before that surgery and normal hearing after that surgery and thus one suspects that the surgery was done for a different reason. That surgery was performed 30 to 35 years ago through [facility] and, about 15 years after that surgery, he suffered a right profound sudden sensorineural hearing loss accompanied by a vestibular crisis event. He had an infection at the time.  It took about a year for him to achieve vestibular compensation and he never recovered hearing in that ear.      He meaghan suffered a left sudden sensorineural hearing loss in 2014 during a head cold.  He was treated with steroids but did not achieve significant hearing improvement.  This remains his better hearing ear by far and he uses a BiCROS system.     He did not undergo MRI following the sudden loss in 2014, nor previously.      Past Medical History:   Diagnosis Date     Deafness in right ear 2002     HL (hearing loss)     Left     Ringing in ears      Tachycardia      Uncomplicated asthma      Patient Active Problem List   Diagnosis     Hyperlipidemia with target LDL less than 130     Moderate persistent asthma      Allergic rhinitis     Traumatic myositis ossificans     Vitreous degeneration     History of hematuria     Hyperglycemia     Transient insomnia     Tachycardia     Chest pain     SVT (supraventricular tachycardia) (H)     Stress fracture of left pubic ramus     Closed fracture of ramus of left pubis (H)       Past Surgical History:   Procedure Laterality Date     COLONOSCOPY N/A 10/10/2017    Procedure: COLONOSCOPY;  colonoscopy;  Surgeon: Ervin Irizarry MD;  Location:  GI     ENT SURGERY  1979    ear surgery     Current Outpatient Medications   Medication     acetaminophen (TYLENOL) 325 MG tablet     albuterol (PROAIR HFA, PROVENTIL HFA, VENTOLIN HFA) 108 (90 BASE) MCG/ACT inhaler     ciprofloxacin-dexamethasone (CIPRODEX) 0.3-0.1 % otic suspension     fluticasone (FLONASE) 50 MCG/ACT spray     fluticasone-vilanterol (BREO ELLIPTA) 200-25 MCG/INH inhaler     HYDROcodone-acetaminophen (NORCO) 5-325 MG tablet     ibuprofen (ADVIL/MOTRIN) 400 MG tablet     montelukast (SINGULAIR) 10 MG tablet     simvastatin (ZOCOR) 20 MG tablet     VITAMIN D PO     zolpidem (AMBIEN) 5 MG tablet     No current facility-administered medications for this visit.         Allergies   Allergen Reactions     Cyclobenzaprine Unknown     Flushing     Seasonal Allergies      Spring and fall allergies     Family History   Problem Relation Age of Onset     High cholesterol Mother      Allergies Mother      Cancer Father 62        Lung cancer as a result of smoking     Asthma Father      High cholesterol Father      Heart Disease Maternal Grandfather      Cerebrovascular Disease Maternal Grandfather      Heart Disease Paternal Grandmother      Heart Disease Paternal Grandfather      Social History:  Social History     Tobacco Use     Smoking status: Never Smoker     Smokeless tobacco: Never Used   Substance Use Topics     Alcohol use: Yes     Alcohol/week: 0.0 standard drinks     Comment: 1-2 galsses most days     Drug use: No   I met the  "patient and his wife, who is also now a patient of mine, in line for the bus for the [place] [employer].      ROS:   Patient Supplied Answers to Review of Systems  [facility] ROS 10/2/2020   Constitutional -   Neurology -   Ears, Nose, Throat Ringing/noise in ears   Cardiopulmonary -   Gastrointestinal/Genitourinary -   Musculoskeletal -   Allergy/Immunology -     Physical Examination:  /80   Pulse 74   Temp 97.9  F (36.6  C)   Resp 16   Ht 1.626 m (5' 4\")   Wt 62.6 kg (138 lb)   SpO2 98%   BMI 23.69 kg/m    General: The patient was unaccompanied, well-groomed, and in no distress  Respiratory: He was breathing comfortably without stridor or stertor or coughing  Neurologic: He is alert and oriented x3.  Facial nerve function is normal bilaterally.  Voice quality is normal.  Eyes: Extraocular motility was intact.  No nystagmus.  Ears: Auricles are normal bilaterally.  Ear microscope exam:  Left ear: Ear canal lined with healthy skin with a moderate amount of cerumen that was removed with a curette.  Tympanic membrane is intact and mobile with an aerated middle ear space.  Right ear: There is been a canal plasty performed and skin lines the widened canal completely.  The inferior aspect of the canal has been excavated or he could have previously had a canal cholesteatoma or keratosis obturans, as there is a large excavation inferior to the inferior annulus.  In this crevice, there was impacted debris with a bright green overcoat.  This was elevated with a right angle from the sulcus and from the surface of the tympanic membrane and back of the TMJ.  Underneath this there was moist mucopurulent debris which was suctioned clear.  At the very base of this along the most medial aspect of the posterior inferior ear canal, is a small ulceration or protuberance of granulation with a central indentation.  It did not bleed but rather was weeping.  The skin around this appeared healthy and the entire thing is about the width " of a cerumen loop.    Audiogram:  Left PTA 43dB, 88% WRS      CT temporal bone, 2014, images reviewed  While the CT shows that the patient has indeed had ear surgery on the right side, characterized by a canal plasty that entered the mastoid air cells, the stapes superstructure appears to be intact and I see no evidence of prosthesis.  Thus, it is not clear what kind of surgery he had and there is not overt evidence of otosclerosis.  Left temporal bone is normal.  Internal auditory canals appear fairly symmetric.    Impression and plan:  1.  Right profound sudden sensorineural hearing loss and vestibular crisis, onset approximately 15 years ago.  2.  Left sudden sensorineural hearing loss, 2014.    I recommend that the patient undergo an MRI scan with gadolinium with attention to the internal auditory canals.  He has not had prior evaluation for retrocochlear lesions following his sudden hearing losses and thus this is indicated, particularly with the bilateral sequential losses.  For now the left ear hearing loss is amenable to amplification.  I would recommend that he return for serial audiometry every 1 to 2 years, and sooner if he notes no changes of course.    3.  Ulceration versus granulation and floor of right external auditory canal, in canal that has previously undergone canal plasty.    The indications for his prior surgery are not completely clear given that he had normal hearing prior to the surgery and there is no evidence of otosclerosis on his CT scan.  The stapes is also in place and the middle ear space looks undisturbed.  Perhaps he had a canal cholesteatoma or other canal pathology.  In any event, this ear has drained periodically over the years and was collecting debris today.  We will first treat the inflammatory tissue with Ciprodex for 2 weeks to see if this will calm this area down.  I will reinspect it when he returns the clinic and we will consider biopsy of the area and treatment with  cautery agent based on its response.  I explained that small skin cancers can arise in chronically inflamed ears over many years, or this could be an inflammatory effect of his prior surgery.    Plan:  1.  MRI IAC protocol  2.  Ciprodex to right ear, 6 drops twice daily x14 days  3.  Follow-up with in clinic appointment after completion of imaging and topical therapy    Sonja Thornton MD  Otology & Neurotology  [facility]

## 2020-10-02 NOTE — PROGRESS NOTES
AUDIOLOGY REPORT    SUMMARY: Audiology visit completed. See audiogram for results.      RECOMMENDATIONS: Follow-up with ENT.    Earnest Winters, [facility]  Licensed Audiologist  [place] #[id number]

## 2020-10-02 NOTE — NURSING NOTE
"Chief Complaint   Patient presents with     RECHECK     chronic hearing loss      Blood pressure 134/80, pulse 74, temperature 97.9  F (36.6  C), resp. rate 16, height 1.626 m (5' 4\"), weight 62.6 kg (138 lb), SpO2 98 %.    Ellis Villa LPN    "

## 2020-10-16 ENCOUNTER — TELEPHONE (OUTPATIENT)
Dept: OTOLARYNGOLOGY | Facility: CLINIC | Age: 75
End: 2020-10-16

## 2020-10-16 DIAGNOSIS — H90.3 BILATERAL SENSORINEURAL HEARING LOSS: ICD-10-CM

## 2020-10-16 RX ORDER — PREDNISOLONE ACETATE 10 MG/ML
6 SUSPENSION/ DROPS OPHTHALMIC 2 TIMES DAILY
Qty: 10 ML | Refills: 0 | Status: SHIPPED | OUTPATIENT
Start: 2020-10-16 | End: 2020-10-21

## 2020-10-16 RX ORDER — CIPROFLOXACIN HYDROCHLORIDE 3.5 MG/ML
6 SOLUTION/ DROPS TOPICAL 2 TIMES DAILY
Qty: 10 ML | Refills: 0 | Status: SHIPPED | OUTPATIENT
Start: 2020-10-16 | End: 2021-03-30

## 2020-10-16 NOTE — TELEPHONE ENCOUNTER
Ciprodex was not covered so sending in alternative. Pt knows it is 2 bottles vs 1.     Kaylynn Gonzalez LPN

## 2020-10-16 NOTE — TELEPHONE ENCOUNTER
[facility] Call Center    Phone Message    May a detailed message be left on voicemail: yes     Reason for Call: Other: Pt said he has a medication question but didn't have the medication Name, Please call Pt to discuss    Thank You,    Action Taken: Message routed to:  [facility] ([facility] ENT    Travel Screening: Not Applicable

## 2020-10-21 ENCOUNTER — TELEPHONE (OUTPATIENT)
Dept: FAMILY MEDICINE | Facility: CLINIC | Age: 75
End: 2020-10-21

## 2020-10-21 DIAGNOSIS — H90.3 BILATERAL SENSORINEURAL HEARING LOSS: ICD-10-CM

## 2020-10-21 DIAGNOSIS — R79.9 ABNORMAL FINDING OF BLOOD CHEMISTRY, UNSPECIFIED: ICD-10-CM

## 2020-10-21 DIAGNOSIS — Z13.1 DIABETES MELLITUS SCREENING: ICD-10-CM

## 2020-10-21 DIAGNOSIS — Z13.220 LIPID SCREENING: Primary | ICD-10-CM

## 2020-10-21 DIAGNOSIS — Z11.59 NEED FOR HEPATITIS C SCREENING TEST: ICD-10-CM

## 2020-10-21 RX ORDER — PREDNISOLONE ACETATE 10 MG/ML
6 SUSPENSION/ DROPS OPHTHALMIC 2 TIMES DAILY
Qty: 10 ML | Refills: 0 | Status: SHIPPED | OUTPATIENT
Start: 2020-10-21 | End: 2021-11-09

## 2020-10-21 NOTE — TELEPHONE ENCOUNTER
Please convert to a phone call message, I can't enter orders in a Pt Schedule request message   ----- Message -----   From: Tanisha De Oliveira RN   Sent: 10/20/2020   3:41 PM CDT   To: Russell Alcantara MD   Subject: FW: Appointment Request                            JF,    Please advise on fasting lab only orders   Pt wants fasting labs prior to physical   Thanks,   Tanisha ALMARAZ RN   ----- Message -----   From: Deborah Ding   Sent: 10/20/2020   3:31 PM CDT   To: [place] T[facility]   Subject: FW: Appointment Request                               ----- Message -----   From: Bear Guido   Sent: 10/20/2020   3:17 PM CDT   To: [place]   Subject: RE: Appointment Request                            Oh, I thought if it was approved in the files I could just walk in for a blood draw without an appointment. But guess that's not the case now. 11/5 is fine at 8am. You will be getting a request from [employer] for a refill of my Simvastatin. I have enough for till after the 5th so test results should reflect effect of that drug. Thanks.

## 2020-11-21 ENCOUNTER — MYC MEDICAL ADVICE (OUTPATIENT)
Dept: FAMILY MEDICINE | Facility: CLINIC | Age: 75
End: 2020-11-21

## 2020-12-02 ENCOUNTER — ANCILLARY PROCEDURE (OUTPATIENT)
Dept: MRI IMAGING | Facility: CLINIC | Age: 75
End: 2020-12-02
Attending: OTOLARYNGOLOGY
Payer: COMMERCIAL

## 2020-12-02 DIAGNOSIS — H90.3 BILATERAL SENSORINEURAL HEARING LOSS: ICD-10-CM

## 2020-12-02 PROCEDURE — 70553 MRI BRAIN STEM W/O & W/DYE: CPT | Performed by: RADIOLOGY

## 2020-12-02 PROCEDURE — A9585 GADOBUTROL INJECTION: HCPCS | Performed by: RADIOLOGY

## 2020-12-02 RX ORDER — GADOBUTROL 604.72 MG/ML
7.5 INJECTION INTRAVENOUS ONCE
Status: COMPLETED | OUTPATIENT
Start: 2020-12-02 | End: 2020-12-02

## 2020-12-02 RX ADMIN — GADOBUTROL 6 ML: 604.72 INJECTION INTRAVENOUS at 11:47

## 2020-12-02 NOTE — DISCHARGE INSTRUCTIONS
MRI Contrast Discharge Instructions    The IV contrast you received today will pass out of your body in your  urine. This will happen in the next 24 hours. You will not feel this process.  Your urine will not change color.    Drink at least 4 extra glasses of water or juice today (unless your doctor  has restricted your fluids). This reduces the stress on your kidneys.  You may take your regular medicines.    If you are on dialysis: It is best to have dialysis today.    If you have a reaction: Most reactions happen right away. If you have  any new symptoms after leaving the hospital (such as hives or swelling),  call your hospital at the correct number below. Or call your family doctor.  If you have breathing distress or wheezing, call 911.    Special instructions: ***    I have read and understand the above information.    Signature:______________________________________ Date:___________    Staff:__________________________________________ Date:___________     Time:__________    [facility] Departments:    ___[place]: [phone]  ___[facility]: [phone]  ___[place]: [phone] ___[facility]: [phone]  ___[facility]: [phone]  ___[facility]: [phone]  ___[phone]  ___[facility]: [phone]  ___[phone]

## 2020-12-04 ENCOUNTER — TELEPHONE (OUTPATIENT)
Dept: OTOLARYNGOLOGY | Facility: CLINIC | Age: 75
End: 2020-12-04

## 2020-12-07 DIAGNOSIS — Z12.5 SCREENING PSA (PROSTATE SPECIFIC ANTIGEN): ICD-10-CM

## 2020-12-07 DIAGNOSIS — Z13.220 LIPID SCREENING: ICD-10-CM

## 2020-12-07 DIAGNOSIS — Z13.1 DIABETES MELLITUS SCREENING: ICD-10-CM

## 2020-12-07 DIAGNOSIS — R79.9 ABNORMAL FINDING OF BLOOD CHEMISTRY, UNSPECIFIED: ICD-10-CM

## 2020-12-07 DIAGNOSIS — Z11.59 NEED FOR HEPATITIS C SCREENING TEST: ICD-10-CM

## 2020-12-07 LAB
ALBUMIN SERPL-MCNC: 3.6 G/DL (ref 3.4–5)
ALP SERPL-CCNC: 86 U/L (ref 40–150)
ALT SERPL W P-5'-P-CCNC: 23 U/L (ref 0–70)
ANION GAP SERPL CALCULATED.3IONS-SCNC: 1 MMOL/L (ref 3–14)
AST SERPL W P-5'-P-CCNC: 22 U/L (ref 0–45)
BASOPHILS # BLD AUTO: 0 10E9/L (ref 0–0.2)
BASOPHILS NFR BLD AUTO: 0.5 %
BILIRUB SERPL-MCNC: 0.6 MG/DL (ref 0.2–1.3)
BUN SERPL-MCNC: 14 MG/DL (ref 7–30)
CALCIUM SERPL-MCNC: 9.3 MG/DL (ref 8.5–10.1)
CHLORIDE SERPL-SCNC: 106 MMOL/L (ref 94–109)
CHOLEST SERPL-MCNC: 183 MG/DL
CO2 SERPL-SCNC: 30 MMOL/L (ref 20–32)
CREAT SERPL-MCNC: 0.99 MG/DL (ref 0.66–1.25)
DIFFERENTIAL METHOD BLD: NORMAL
EOSINOPHIL # BLD AUTO: 0.5 10E9/L (ref 0–0.7)
EOSINOPHIL NFR BLD AUTO: 6.1 %
ERYTHROCYTE [DISTWIDTH] IN BLOOD BY AUTOMATED COUNT: 12.7 % (ref 10–15)
GFR SERPL CREATININE-BSD FRML MDRD: 75 ML/MIN/{1.73_M2}
GLUCOSE SERPL-MCNC: 94 MG/DL (ref 70–99)
HCT VFR BLD AUTO: 47.9 % (ref 40–53)
HDLC SERPL-MCNC: 58 MG/DL
HGB BLD-MCNC: 15.5 G/DL (ref 13.3–17.7)
LDLC SERPL CALC-MCNC: 103 MG/DL
LYMPHOCYTES # BLD AUTO: 2.1 10E9/L (ref 0.8–5.3)
LYMPHOCYTES NFR BLD AUTO: 25.9 %
MCH RBC QN AUTO: 30 PG (ref 26.5–33)
MCHC RBC AUTO-ENTMCNC: 32.4 G/DL (ref 31.5–36.5)
MCV RBC AUTO: 93 FL (ref 78–100)
MONOCYTES # BLD AUTO: 0.8 10E9/L (ref 0–1.3)
MONOCYTES NFR BLD AUTO: 10.4 %
NEUTROPHILS # BLD AUTO: 4.5 10E9/L (ref 1.6–8.3)
NEUTROPHILS NFR BLD AUTO: 57.1 %
NONHDLC SERPL-MCNC: 125 MG/DL
PLATELET # BLD AUTO: 260 10E9/L (ref 150–450)
POTASSIUM SERPL-SCNC: 4.1 MMOL/L (ref 3.4–5.3)
PROT SERPL-MCNC: 7.6 G/DL (ref 6.8–8.8)
PSA SERPL-ACNC: 1.45 UG/L (ref 0–4)
RBC # BLD AUTO: 5.17 10E12/L (ref 4.4–5.9)
SODIUM SERPL-SCNC: 137 MMOL/L (ref 133–144)
TRIGL SERPL-MCNC: 111 MG/DL
WBC # BLD AUTO: 7.9 10E9/L (ref 4–11)

## 2020-12-07 PROCEDURE — 36415 COLL VENOUS BLD VENIPUNCTURE: CPT | Performed by: FAMILY MEDICINE

## 2020-12-07 PROCEDURE — 85025 COMPLETE CBC W/AUTO DIFF WBC: CPT | Performed by: FAMILY MEDICINE

## 2020-12-07 PROCEDURE — 80061 LIPID PANEL: CPT | Performed by: FAMILY MEDICINE

## 2020-12-07 PROCEDURE — 86803 HEPATITIS C AB TEST: CPT | Performed by: FAMILY MEDICINE

## 2020-12-07 PROCEDURE — G0103 PSA SCREENING: HCPCS | Performed by: FAMILY MEDICINE

## 2020-12-07 PROCEDURE — 80053 COMPREHEN METABOLIC PANEL: CPT | Performed by: FAMILY MEDICINE

## 2020-12-08 ENCOUNTER — TELEPHONE (OUTPATIENT)
Dept: FAMILY MEDICINE | Facility: CLINIC | Age: 75
End: 2020-12-08

## 2020-12-08 LAB — HCV AB SERPL QL IA: NONREACTIVE

## 2020-12-08 NOTE — TELEPHONE ENCOUNTER
Left message for patient to callback.  SHANNON Cornelius Jared Matthew, MD  [facility]    Cc[facility]             Pt should have an appt to review labs and refill medications.  Can be virtual.

## 2020-12-10 ENCOUNTER — VIRTUAL VISIT (OUTPATIENT)
Dept: FAMILY MEDICINE | Facility: CLINIC | Age: 75
End: 2020-12-10
Payer: COMMERCIAL

## 2020-12-10 DIAGNOSIS — Z00.00 ENCOUNTER FOR MEDICARE ANNUAL WELLNESS EXAM: Primary | ICD-10-CM

## 2020-12-10 DIAGNOSIS — E78.5 HYPERLIPIDEMIA WITH TARGET LDL LESS THAN 130: ICD-10-CM

## 2020-12-10 PROCEDURE — G0439 PPPS, SUBSEQ VISIT: HCPCS | Mod: 95 | Performed by: FAMILY MEDICINE

## 2020-12-10 RX ORDER — ATORVASTATIN CALCIUM 10 MG/1
10 TABLET, FILM COATED ORAL DAILY
Qty: 90 TABLET | Refills: 3 | Status: SHIPPED | OUTPATIENT
Start: 2020-12-10 | End: 2021-03-04

## 2020-12-10 ASSESSMENT — ACTIVITIES OF DAILY LIVING (ADL): CURRENT_FUNCTION: NO ASSISTANCE NEEDED

## 2020-12-10 NOTE — PATIENT INSTRUCTIONS
Patient Education   Personalized Prevention Plan  You are due for the preventive services outlined below.  Your care team is available to assist you in scheduling these services.  If you have already completed any of these items, please share that information with your care team to update in your medical record.  Health Maintenance Due   Topic Date Due     Pneumococcal Vaccine (2 of 2 - PPSV23) 10/21/2016     Asthma Action Plan - yearly  06/26/2018     FALL RISK ASSESSMENT  12/17/2019     Asthma Control Test  03/04/2020     Zoster (Shingles) Vaccine (3 of 3) 12/10/2020       Urinary Incontinence (Male)    Urinary incontinence means not being able to control the release of urine from the bladder.   Causes  Common causes of urinary incontinence in men include:    Infection    Certain medicines    Aging    Poor pelvic muscle tone    Bladder spasms    Obesity    Trouble urinating and fully emptying the bladder (urinary retention)  Other things that can cause incontinence are:     Nervous system diseases    Diabetes    Sleep apnea    Urinary tract infections    Prostate surgery    Pelvic injury  Constipation and smoking have also been identified as risk factors.   Symptoms    Urge incontinence (overactive bladder). This is a sudden urge to urinate. It occurs even though there may not be much urine in the bladder. The need to urinate often during the night is common. It's due to bladder spasms.    Stress incontinence. This is urine leakage that you can't control. It can occur with sneezing, coughing, and other actions that put stress on the bladder.    Treatment  Treatment depends on what is causing the condition. Bladder infections are treated with antibiotics. Urinary retention is treated with a bladder catheter.   Home care  Follow these guidelines when caring for yourself at home:    Don't have any foods and drinks that may irritate the bladder. This includes:  ? Chocolate  ? Alcohol  ? Caffeine  ? Carbonated  drinks  ? Acidic fruits and juices    Limit fluids to 6 to 8 cups a day.    Lose weight if you are overweight. This will reduce your symptoms.    If advised, do regular pelvic muscle-strengthening exercises such as Kegel exercises.    If needed, wear absorbent pads to catch urine. Change the pads often. This is for good hygiene and to prevent skin and bladder infections.    Bathe daily for good hygiene.    If an antibiotic was prescribed to treat a bladder infection, take it until it's finished. Keep taking it even if you are feeling better. This is to make sure your infection has cleared.    If a catheter was left in place, keep bacteria from getting into the collection bag. Don't disconnect the catheter from the collection bag.    Use a leg band to secure the catheter drainage tube, so it does not pull on the catheter. Drain the collection bag when it becomes full. To do this, use the drain spout at the bottom of the bag. Don't disconnect the bag from the catheter.    Don't pull on or try to remove a catheter. The catheter must be removed by a healthcare provider.    If you smoke, stop. Ask your provider for help if you can't do this on your own.  Follow-up care  Follow up with your healthcare provider, or as advised.  When to get medical advice  Call your healthcare provider right away if any of these occur:    Fever over 100.4 F (38 C), or as directed by your provider    Bladder pain or fullness    Belly swelling, nausea, or vomiting    Back pain    Weakness, dizziness, or fainting    If a catheter was left in place, return if:  ? The catheter falls out  ? The catheter stops draining for 6 hours  ? Your urine gets cloudy or smells bad  [employer] last reviewed this educational content on 1/1/2020 2000-2020 [employer]. [address] [place] [address], [place] [address]. All rights reserved. This information is not intended as a substitute for professional medical care. Always follow your healthcare  professional's instructions.

## 2020-12-10 NOTE — PROGRESS NOTES
"Bear Guido is a 75 year old male who is being evaluated via a billable video visit.      The patient has been notified of following:     \"This video visit will be conducted via a call between you and your physician/provider. We have found that certain health care needs can be provided without the need for an in-person physical exam.  This service lets us provide the care you need with a video conversation.  If a prescription is necessary we can send it directly to your pharmacy.  If lab work is needed we can place an order for that and you can then stop by our lab to have the test done at a later time.    Video visits are billed at different rates depending on your insurance coverage.  Please reach out to your insurance provider with any questions.    If during the course of the call the physician/provider feels a video visit is not appropriate, you will not be charged for this service.\"    Patient has given verbal consent for Video visit? Yes  How would you like to obtain your AVS? MyChart  If you are dropped from the video visit, the video invite should be resent to: Send to e-mail at: [email or web address]  Will anyone else be joining your video visit? No      Subjective     Bear Guido is a 75 year old male who presents today via video visit for the following health issues:      SUBJECTIVE:   Bear Guido is a 75 year old male who presents for Preventive Visit.      Patient has been advised of split billing requirements and indicates understanding: Yes   Are you in the first 12 months of your Medicare coverage?  No    Healthy Habits:    In general, how would you rate your overall health?  Excellent    Frequency of exercise:  4-5 days/week    Duration of exercise:  30-45 minutes    Do you usually eat at least 4 servings of fruit and vegetables a day, include whole grains    & fiber and avoid regularly eating high fat or \"junk\" foods?  Yes    Taking medications regularly:  Yes    Barriers to taking medications:  " None    Medication side effects:  None    Ability to successfully perform activities of daily living:  No assistance needed    Home Safety:  No safety concerns identified    Hearing Impairment:  No hearing concerns (Patient wears hearing aids )    In the past 6 months, have you been bothered by leaking of urine? Yes    In general, how would you rate your overall mental or emotional health?  Excellent      PHQ-2 Total Score:    Additional concerns today:  Yes (Review Labs )    BP at dentist 135/72    notices that the LDL is not as good as it could be.  We discussed switching to 10 mg liptor      Do you feel safe in your environment? Yes    Have you ever done Advance Care Planning? (For example, a Health Directive, POLST, or a discussion with a medical provider or your loved ones about your wishes): Yes, patient states has an Advance Care Planning document and will bring a copy to the clinic.      Fall risk  Fallen 2 or more times in the past year?: No  Any fall with injury in the past year?: No    Cognitive Screening Unable to complete due to virtual visit; need for additional assessment in future face-to-face visit    Do you have sleep apnea, excessive snoring or daytime drowsiness?: no    Reviewed and updated as needed this visit by clinical staff  Tobacco  Allergies  Meds   Med Hx  Surg Hx  Fam Hx  Soc Hx        Reviewed and updated as needed this visit by Provider                Social History     Tobacco Use     Smoking status: Never Smoker     Smokeless tobacco: Never Used   Substance Use Topics     Alcohol use: Yes     Alcohol/week: 0.0 standard drinks     Comment: 1 - 2 drinks daily in summer, less in winter     If you drink alcohol do you typically have >3 drinks per day or >7 drinks per week? No    No flowsheet data found.    Patient would like to review labs done earlier this week.         Current providers sharing in care for this patient include:   Patient Care Team:  Russell Alcantara MD as  PCP - General (Family Practice)  Norm Tenorio PA-C as Assigned PCP  Sonja Thornton MD as Assigned Surgical Provider    The following health maintenance items are reviewed in Epic and correct as of today:  Health Maintenance   Topic Date Due     Pneumococcal Vaccine: 65+ Years (2 of 2 - PPSV23) 10/21/2016     ASTHMA ACTION PLAN  06/26/2018     FALL RISK ASSESSMENT  12/17/2019     ASTHMA CONTROL TEST  03/04/2020     ZOSTER IMMUNIZATION (3 of 3) 12/10/2020     MEDICARE ANNUAL WELLNESS VISIT  12/10/2021     LIPID  12/07/2025     ADVANCE CARE PLANNING  12/10/2025     DTAP/TDAP/TD IMMUNIZATION (3 - Td) 01/06/2026     COLORECTAL CANCER SCREENING  10/10/2027     HEPATITIS C SCREENING  Completed     PHQ-2  Completed     INFLUENZA VACCINE  Completed     AORTIC ANEURYSM SCREENING (SYSTEM ASSIGNED)  Completed     Pneumococcal Vaccine: Pediatrics (0 to 5 Years) and At-Risk Patients (6 to 64 Years)  Aged Out     IPV IMMUNIZATION  Aged Out     MENINGITIS IMMUNIZATION  Aged Out     HEPATITIS B IMMUNIZATION  Aged Out     Lab work is in process  Labs reviewed in EPIC  BP Readings from Last 3 Encounters:   10/02/20 134/80   08/31/20 133/67   07/20/20 107/57    Wt Readings from Last 3 Encounters:   10/02/20 62.6 kg (138 lb)   08/31/20 62.3 kg (137 lb 4.8 oz)   07/19/20 59.6 kg (131 lb 6.4 oz)                  Patient Active Problem List   Diagnosis     Hyperlipidemia with target LDL less than 130     Moderate persistent asthma     Allergic rhinitis     Traumatic myositis ossificans     Vitreous degeneration     History of hematuria     Hyperglycemia     Transient insomnia     Tachycardia     Chest pain     SVT (supraventricular tachycardia) (H)     Stress fracture of left pubic ramus     Closed fracture of ramus of left pubis (H)     Past Surgical History:   Procedure Laterality Date     COLONOSCOPY N/A 10/10/2017    Procedure: COLONOSCOPY;  colonoscopy;  Surgeon: Ervin Irizarry MD;  Location:  GI     ENT SURGERY   1979    ear surgery       Social History     Tobacco Use     Smoking status: Never Smoker     Smokeless tobacco: Never Used   Substance Use Topics     Alcohol use: Yes     Alcohol/week: 0.0 standard drinks     Comment: 1 - 2 drinks daily in summer, less in winter     Family History   Problem Relation Age of Onset     High cholesterol Mother      Allergies Mother      Hyperlipidemia Mother      Cancer Father 62        Lung cancer as a result of smoking     Asthma Father      High cholesterol Father      Hyperlipidemia Father      Heart Disease Maternal Grandfather      Cerebrovascular Disease Maternal Grandfather      Heart Disease Paternal Grandmother      Coronary Artery Disease Paternal Grandmother      Heart Disease Paternal Grandfather      Coronary Artery Disease Paternal Grandfather          Current Outpatient Medications   Medication Sig Dispense Refill     albuterol (PROAIR HFA, PROVENTIL HFA, VENTOLIN HFA) 108 (90 BASE) MCG/ACT inhaler Inhale 2 puffs into the lungs every 6 hours as needed for shortness of breath / dyspnea or wheezing 3 Inhaler 1     ciprofloxacin (CILOXAN) 0.3 % ophthalmic solution Place 6 drops into the right ear 2 times daily 10 mL 0     fluticasone (FLONASE) 50 MCG/ACT spray Spray 1 spray into both nostrils as needed for rhinitis or allergies       fluticasone-vilanterol (BREO ELLIPTA) 200-25 MCG/INH inhaler Inhale 1 puff into the lungs daily       ibuprofen (ADVIL/MOTRIN) 400 MG tablet Take 1 tablet (400 mg) by mouth every 6 hours as needed for moderate pain       montelukast (SINGULAIR) 10 MG tablet Take 10 mg by mouth At Bedtime       prednisoLONE acetate (PRED FORTE) 1 % ophthalmic suspension Place 6 drops into the right ear 2 times daily 10 mL 0     simvastatin (ZOCOR) 20 MG tablet TAKE 1/2 TABLET AT BEDTIME 45 tablet 0     VITAMIN D PO Take 1 tablet by mouth daily       zolpidem (AMBIEN) 5 MG tablet Take 1 tablet (5 mg) by mouth nightly as needed for sleep 15 tablet 1  "    Immunization History   Administered Date(s) Administered     Cholera, unspecified formulation 04/29/1991     Flu, Unspecified 10/13/1997     HEPA 11/15/1995, 02/17/1997     HepB 09/30/2009, 12/08/2009, 09/30/2010     Influenza (High Dose) 3 valent vaccine 10/27/2011, 11/07/2012, 09/30/2013, 10/02/2014, 10/21/2015, 09/28/2016, 09/21/2017, 10/25/2018, 11/25/2019     Influenza (IIV3) PF 10/13/2005, 11/08/2007, 10/29/2008, 12/08/2009, 11/10/2010     Influenza Vaccine IM > 6 months Valent IIV4 10/13/2020     Meningococcal (Menactra ) 12/08/2009     Meningococcal (Menomune ) 12/11/1997     Pneumo Conj 13-V (2010&after) 10/21/2015     Pneumococcal 23 valent 07/20/2009     Poliovirus, inactivated (IPV) 12/11/1997     Rabies - IM Diploid Cell Culture 02/10/2000, 02/17/2000     TDAP Vaccine (Adacel) 01/06/2016     Td (Adult), Adsorbed 03/06/1991, 01/29/2001     Tdap (Adacel,Boostrix) 12/28/2007     Typhoid IM 02/10/2000     Typhoid Oral 12/28/2007, 10/20/2014     Yellow Fever 04/11/1991, 12/08/2009     Zoster vaccine, live 10/28/2014         Review of Systems  10 point ROS of systems including Constitutional, Eyes, Respiratory, Cardiovascular, Gastroenterology, Genitourinary, Integumentary, Muscularskeletal, Psychiatric were all negative except for pertinent positives noted in my HPI.    OBJECTIVE:   There were no vitals taken for this visit. Estimated body mass index is 23.69 kg/m  as calculated from the following:    Height as of 10/2/20: 1.626 m (5' 4\").    Weight as of 10/2/20: 62.6 kg (138 lb).     Physical Exam  GENERAL: Healthy, alert and no distress  EYES: Eyes grossly normal to inspection.  No discharge or erythema, or obvious scleral/conjunctival abnormalities.  RESP: No audible wheeze, cough, or visible cyanosis.  No visible retractions or increased work of breathing.    SKIN: Visible skin clear. No significant rash, abnormal pigmentation or lesions.  NEURO: Cranial nerves grossly intact.  Mentation and speech " "appropriate for age.  PSYCH: Mentation appears normal, affect normal/bright, judgement and insight intact, normal speech and appearance well-groomed.      ASSESSMENT / PLAN:   1. Encounter for Medicare annual wellness exam    2. Hyperlipidemia with target LDL less than 130  - atorvastatin (LIPITOR) 10 MG tablet; Take 1 tablet (10 mg) by mouth daily  Dispense: 90 tablet; Refill: 3        Patient has been advised of split billing requirements and indicates understanding: Yes  COUNSELING:  Reviewed preventive health counseling, as reflected in patient instructions       Regular exercise       Healthy diet/nutrition    Estimated body mass index is 23.69 kg/m  as calculated from the following:    Height as of 10/2/20: 1.626 m (5' 4\").    Weight as of 10/2/20: 62.6 kg (138 lb).        He reports that he has never smoked. He has never used smokeless tobacco.      Appropriate preventive services were discussed with this patient, including applicable screening as appropriate for cardiovascular disease, diabetes, osteopenia/osteoporosis, and glaucoma.  As appropriate for age/gender, discussed screening for colorectal cancer, prostate cancer, breast cancer, and cervical cancer. Checklist reviewing preventive services available has been given to the patient.    Reviewed patients plan of care and provided an AVS. The Basic Care Plan (routine screening as documented in Health Maintenance) for Bear meets the Care Plan requirement. This Care Plan has been established and reviewed with the Patient.    Counseling Resources:  ATP IV Guidelines  Pooled Cohorts Equation Calculator  Breast Cancer Risk Calculator  Breast Cancer: Medication to Reduce Risk  FRAX Risk Assessment  ICSI Preventive Guidelines  Dietary Guidelines for Americans, 2010  [employer]'s MyPlate  ASA Prophylaxis  Lung CA Screening    Russell Murali Alcantara MD  [facility]    Identified Health Risks:        Video-Visit Details    Type of service:  Video " Visit    Video End Time:1:57 PM    Originating Location (pt. Location): Home    Distant Location (provider location):  [facility]     Platform used for Video Visit: José Miguel

## 2020-12-11 ASSESSMENT — ASTHMA QUESTIONNAIRES: ACT_TOTALSCORE: 25

## 2020-12-16 NOTE — PATIENT INSTRUCTIONS
1. You were seen in the ENT Clinic today by Dr. Thornton .  If you have any questions or concerns after your appointment, please call   - Option 1: ENT Clinic: [phone]   - Option 2: Kaylynn (Dr. Thornton' Nurse): [phone]  2.   Plan to return to clinic in 1 year with hearing test    Kaylynn Gonzalez LPN  [facility] - Otolaryngology

## 2020-12-18 ENCOUNTER — OFFICE VISIT (OUTPATIENT)
Dept: OTOLARYNGOLOGY | Facility: CLINIC | Age: 75
End: 2020-12-18
Payer: COMMERCIAL

## 2020-12-18 VITALS
TEMPERATURE: 98 F | HEART RATE: 68 BPM | WEIGHT: 136 LBS | OXYGEN SATURATION: 98 % | SYSTOLIC BLOOD PRESSURE: 130 MMHG | HEIGHT: 64 IN | BODY MASS INDEX: 23.22 KG/M2 | RESPIRATION RATE: 20 BRPM | DIASTOLIC BLOOD PRESSURE: 78 MMHG

## 2020-12-18 DIAGNOSIS — H90.3 ASYMMETRICAL SENSORINEURAL HEARING LOSS: Primary | ICD-10-CM

## 2020-12-18 DIAGNOSIS — H61.91 LESION OF EXTERNAL EAR CANAL, RIGHT: ICD-10-CM

## 2020-12-18 PROCEDURE — 99213 OFFICE O/P EST LOW 20 MIN: CPT | Mod: 25 | Performed by: OTOLARYNGOLOGY

## 2020-12-18 PROCEDURE — 92504 EAR MICROSCOPY EXAMINATION: CPT | Performed by: OTOLARYNGOLOGY

## 2020-12-18 ASSESSMENT — PAIN SCALES - GENERAL: PAINLEVEL: NO PAIN (0)

## 2020-12-18 ASSESSMENT — MIFFLIN-ST. JEOR: SCORE: 1267.89

## 2020-12-18 NOTE — LETTER
12/18/2020       RE: Bear Guido  [address] [id number]     Dear Colleague,    Thank you for referring your patient, Bear Guido, to the [facility] [place] [facility]. Please see a copy of my visit note below.    12/18/20    Neurotology Clinic Note    I had the pleasure of seeing Mr. Guido in follow up today in the neurotology clinic.  He is a 75-year-old man who is previously been a patient of my colleague Dr. Phillips, who has since retired.    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  CT demonstrates that he has had a canal plasty performed on the right and his ossicular chain including the stapes appears to still be in place.  He recalls having normal hearing before that surgery and normal hearing after that surgery and thus one suspects that the surgery was done for a different reason. That surgery was performed 30 to 35 years ago through [facility] and, about 15 years after that surgery, he suffered a right profound sudden sensorineural hearing loss accompanied by a vestibular crisis event. He had an infection at the time.  It took about a year for him to achieve vestibular compensation and he never recovered hearing in that ear.      He later suffered a left sudden sensorineural hearing loss in 2014 during a head cold.  He was treated with steroids but did not achieve significant hearing improvement.  This remains his better hearing ear by far and he uses a BiCROS system.  He did not undergo MRI following the sudden loss in 2014, nor previously and this was acquired before today's visit.    I also observed an ulceration in his canal in October and we discussed treatment of the area first with drops and then consideration of biopsy or other intervention based on the appearance today.  I explained that small skin cancers can arise  "in chronically inflamed ears over many years, or this could be an inflammatory effect of his prior surgery.     Today he reports that he has been doing well, and the ear feels like it is ready to be cleaned.  No pain or drainage or further fluctuations in hearing.  We ordered an MRI to review today given the significant asymmetry in his sensorineural hearing loss.    I met the patient and his wife, who is also now a patient of mine, in line for the bus for the [place] [employer].      ROS:   Patient Supplied Answers to Review of Systems  [facility] ROS 12/18/2020   Constitutional -   Neurology -   Ears, Nose, Throat Nasal congestion or drainage   Cardiopulmonary -   Gastrointestinal/Genitourinary -   Musculoskeletal -   Allergy/Immunology -     Physical Examination:  /78   Pulse 68   Temp 98  F (36.7  C)   Resp 20   Ht 1.626 m (5' 4\")   Wt 61.7 kg (136 lb)   SpO2 98%   BMI 23.34 kg/m    General: The patient was unaccompanied, well-groomed, and in no distress  Respiratory: He was breathing comfortably without stridor or stertor or coughing  Neurologic: He is alert and oriented x3.  Facial nerve function is normal bilaterally.  Voice quality is normal  Ears: Auricles are normal bilaterally.  Ear microscope exam:  Left ear: Ear canal lined with healthy skin with a moderate amount of cerumen that was removed.  Tympanic membrane is intact and mobile with an aerated middle ear space.  Right ear: There is been a canal plasty performed and skin lines the widened canal completely. There is no longer an ulceration, rather there remains an excavation inferior to the inferior annulus and it is line with mucosalized skin and this was suctioned clean today.  No granulation or heaped up edges. The skin around this appeared healthy and the entire thing is about the width of a cerumen loop. Canal was debrided.    Audiogram:    In October, Left PTA 43dB, 88% WRS      CT temporal bone, 2014, images reviewed  While the CT shows " that the patient has indeed had ear surgery on the right side, characterized by a canal plasty that entered the mastoid air cells, the stapes superstructure appears to be intact and I see no evidence of prosthesis.  Thus, it is not clear what kind of surgery he had and there is not overt evidence of otosclerosis.  Left temporal bone is normal.  Internal auditory canals appear fairly symmetric.    MRI:  12/2/2020.  Images independently reviewed.  No retrocochlear lesion to explain asymmetric sensorineural hearing loss, which is reassuring.    Impression and plan:  1.  Right profound sudden sensorineural hearing loss and vestibular crisis, onset approximately 15 years ago.  2.  Left sudden sensorineural hearing loss, 2014.  Reviewed the MRI images and there is no retrocochlear lesion.  Ongoing amplification left ear and BiCROS right ear (HAE) at his discretion.   I would recommend that he return for serial audiometry every 1 to 2 years, and sooner if he notes any changes in hearing.    3.  Prior right canalplasty with inferior excavation.  Does not appear ulcerated today.    - Recommend routine cleaning to avoid further bone remodeling.  - Return visit in 1 year with an audiogram    Sonja Thornton MD  Otology & Neurotology  [facility]

## 2020-12-18 NOTE — PROGRESS NOTES
12/18/20    Neurotology Clinic Note    I had the pleasure of seeing Mr. Guido in follow up today in the neurotology clinic.  He is a 75-year-old man who is previously been a patient of my colleague Dr. Phillips, who has since retired.    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  CT demonstrates that he has had a canal plasty performed on the right and his ossicular chain including the stapes appears to still be in place.  He recalls having normal hearing before that surgery and normal hearing after that surgery and thus one suspects that the surgery was done for a different reason. That surgery was performed 30 to 35 years ago through [facility] and, about 15 years after that surgery, he suffered a right profound sudden sensorineural hearing loss accompanied by a vestibular crisis event. He had an infection at the time.  It took about a year for him to achieve vestibular compensation and he never recovered hearing in that ear.      He later suffered a left sudden sensorineural hearing loss in 2014 during a head cold.  He was treated with steroids but did not achieve significant hearing improvement.  This remains his better hearing ear by far and he uses a BiCROS system.  He did not undergo MRI following the sudden loss in 2014, nor previously and this was acquired before today's visit.    I also observed an ulceration in his canal in October and we discussed treatment of the area first with drops and then consideration of biopsy or other intervention based on the appearance today.  I explained that small skin cancers can arise in chronically inflamed ears over many years, or this could be an inflammatory effect of his prior surgery.     Today he reports that he has been doing well, and the ear feels like it is ready to be cleaned.  No pain or drainage or further fluctuations in hearing.  We ordered an MRI to review today given  "the significant asymmetry in his sensorineural hearing loss.    I met the patient and his wife, who is also now a patient of mine, in line for the bus for the [place] [employer].      ROS:   Patient Supplied Answers to Review of Systems  [facility] ROS 12/18/2020   Constitutional -   Neurology -   Ears, Nose, Throat Nasal congestion or drainage   Cardiopulmonary -   Gastrointestinal/Genitourinary -   Musculoskeletal -   Allergy/Immunology -     Physical Examination:  /78   Pulse 68   Temp 98  F (36.7  C)   Resp 20   Ht 1.626 m (5' 4\")   Wt 61.7 kg (136 lb)   SpO2 98%   BMI 23.34 kg/m    General: The patient was unaccompanied, well-groomed, and in no distress  Respiratory: He was breathing comfortably without stridor or stertor or coughing  Neurologic: He is alert and oriented x3.  Facial nerve function is normal bilaterally.  Voice quality is normal  Ears: Auricles are normal bilaterally.  Ear microscope exam:  Left ear: Ear canal lined with healthy skin with a moderate amount of cerumen that was removed.  Tympanic membrane is intact and mobile with an aerated middle ear space.  Right ear: There is been a canal plasty performed and skin lines the widened canal completely. There is no longer an ulceration, rather there remains an excavation inferior to the inferior annulus and it is line with mucosalized skin and this was suctioned clean today.  No granulation or heaped up edges. The skin around this appeared healthy and the entire thing is about the width of a cerumen loop. Canal was debrided.    Audiogram:    In October, Left PTA 43dB, 88% WRS      CT temporal bone, 2014, images reviewed  While the CT shows that the patient has indeed had ear surgery on the right side, characterized by a canal plasty that entered the mastoid air cells, the stapes superstructure appears to be intact and I see no evidence of prosthesis.  Thus, it is not clear what kind of surgery he had and there is not overt evidence of " otosclerosis.  Left temporal bone is normal.  Internal auditory canals appear fairly symmetric.    MRI:  12/2/2020.  Images independently reviewed.  No retrocochlear lesion to explain asymmetric sensorineural hearing loss, which is reassuring.    Impression and plan:  1.  Right profound sudden sensorineural hearing loss and vestibular crisis, onset approximately 15 years ago.  2.  Left sudden sensorineural hearing loss, 2014.  Reviewed the MRI images and there is no retrocochlear lesion.  Ongoing amplification left ear and BiCROS right ear (HAE) at his discretion.   I would recommend that he return for serial audiometry every 1 to 2 years, and sooner if he notes any changes in hearing.    3.  Prior right canalplasty with inferior excavation.  Does not appear ulcerated today.    - Recommend routine cleaning to avoid further bone remodeling.  - Return visit in 1 year with an audiogram    Sonja Thornton MD  Otology & Neurotology  [facility]

## 2020-12-18 NOTE — NURSING NOTE
"Chief Complaint   Patient presents with     RECHECK     follow up after MRI     Blood pressure 130/78, pulse 68, temperature 98  F (36.7  C), resp. rate 20, height 1.626 m (5' 4\"), weight 61.7 kg (136 lb), SpO2 98 %.    Ellis Villa LPN    "

## 2021-01-09 PROBLEM — S32.592A CLOSED FRACTURE OF RAMUS OF LEFT PUBIS (H): Status: RESOLVED | Noted: 2020-08-18 | Resolved: 2021-01-09

## 2021-01-09 NOTE — PROGRESS NOTES
DISCHARGE REPORT    Bear did not return for further treatment after the last visit on 8/27/20.   Current status is unknown.  Please see information below for last known relevant information.  Patient seen for 2 visits.    SUBJECTIVE  Subjective changes noted by patient:  Patient reports that he is doing better.  Pain is improving.  He's less dependent on his cane.   .  Current pain level is  .     Previous pain level was   .   Changes in function: (See Goal flowsheet attached for changes in current functional level)  Adverse reaction to treatment or activity: None    OBJECTIVE  Changes noted in objective findings:   Attempted SL hip abduction, but notable pain with that yet.  Added clamshell instead and tolerated that better.  Gait is fairly good without SEC, mild change in stance time side to side, but no notable hip drop observed     ASSESSMENT/PLAN  Diagnosis: Fx pubic ramus   Updated problem list and treatment plan:  Patient will continue to utilize HEP for any remaining deficits.   STG/LTGs have been met or progress has been made towards goals:  Please see goal flowsheet for most current information  Assessment of Progress: current status is unknown.    Last current status: Pt is progressing as expected   Self Management Plans:  HEP  I have re-evaluated this patient and find that the nature, scope, duration and intensity of the therapy is appropriate for the medical condition of the patient.  Bear continues to require the following intervention to meet STG and LTG's:  HEP.    Recommendations:  Discharge with current home program.  Patient to follow up with MD as needed.    Please refer to the daily flowsheet for treatment today, total treatment time and time spent performing 1:1 timed codes.

## 2021-03-03 ENCOUNTER — MYC MEDICAL ADVICE (OUTPATIENT)
Dept: FAMILY MEDICINE | Facility: CLINIC | Age: 76
End: 2021-03-03

## 2021-03-03 DIAGNOSIS — E78.5 HYPERLIPIDEMIA WITH TARGET LDL LESS THAN 130: ICD-10-CM

## 2021-03-03 DIAGNOSIS — F51.02 TRANSIENT INSOMNIA: ICD-10-CM

## 2021-03-03 DIAGNOSIS — J45.40 MODERATE PERSISTENT ASTHMA WITHOUT COMPLICATION: Primary | ICD-10-CM

## 2021-03-04 RX ORDER — MONTELUKAST SODIUM 10 MG/1
10 TABLET ORAL AT BEDTIME
Qty: 90 TABLET | Refills: 0 | Status: SHIPPED | OUTPATIENT
Start: 2021-03-04 | End: 2021-06-22

## 2021-03-04 RX ORDER — ATORVASTATIN CALCIUM 10 MG/1
10 TABLET, FILM COATED ORAL DAILY
Qty: 90 TABLET | Refills: 2 | Status: SHIPPED | OUTPATIENT
Start: 2021-03-04 | End: 2022-01-05

## 2021-03-04 RX ORDER — ZOLPIDEM TARTRATE 5 MG/1
5 TABLET ORAL
Qty: 15 TABLET | Refills: 1 | Status: SHIPPED | OUTPATIENT
Start: 2021-03-04 | End: 2022-04-12

## 2021-03-04 NOTE — TELEPHONE ENCOUNTER
DAVINA    montelukast      Last Written Prescription Date:  7/19/20 in patient  Last Fill Quantity: ?,   # refills: ?  Last Office Visit: 12/10/20  Future Office visit:       Routing refill request to provider for review/approval because:  Medication is reported/historical    Controlled Substance Refill Request for zolpidem    Last refill: 7/25/20 #15    Last clinic visit: 12/10/20     Clinic visit frequency required: Q 6  months  Next appt:     Controlled substance agreement on file: No.    Documentation in problem list reviewed:  Yes    Processing:  Rx to be electronically transmitted to pharmacy by provider     [place] reviewed:  not reviewed -  Not working, again  [place] profile:  [email or web address]    Please approve if appropriate  Bhavya Vieira RN

## 2021-03-22 ENCOUNTER — MYC MEDICAL ADVICE (OUTPATIENT)
Dept: FAMILY MEDICINE | Facility: CLINIC | Age: 76
End: 2021-03-22

## 2021-03-29 NOTE — PROGRESS NOTES
Assessment & Plan     Dysuria  Follow up UA in 1-2 weeks.  - UA reflex to Microscopic and Culture  - Urine Microscopic  - sulfamethoxazole-trimethoprim (BACTRIM DS) 800-160 MG tablet; Take 1 tablet by mouth 2 times daily for 3 days                 No follow-ups on file.    JOHN Rowley [facility] Sifuentes is a 75 year old who presents for the following health issues     HPI     Genitourinary - Male  Onset/Duration: 2 days   Description:   Dysuria (painful urination): YES- burning  Hematuria (blood in urine): YES  Frequency: YES- due to drinking a lot more water   Waking at night to urinate: YES- but that is normal   Hesitancy (delay in urine): no  Retention (unable to empty): no  Decrease in urinary flow: no  Incontinence: no  Progression of Symptoms:  Burning sensation- same as yesterday, blood has gotten better   Accompanying Signs & Symptoms:  Fever: no  Back/Flank pain: no  Urethral discharge: no  Testicle lumps/masses/pain: no  Nausea and/or vomiting: no  Abdominal pain: no  History:   History of frequent UTI s: no  History of kidney stones: no  History of hernias: no  Personal or Family history of Prostate problems: no  Sexually active: YES  Precipitating or alleviating factors: None   Therapies tried and outcome: increase fluid intake        Review of Systems   Constitutional, HEENT, cardiovascular, pulmonary, gi and gu systems are negative, except as otherwise noted.      Objective    /77 (BP Location: Right arm, Patient Position: Sitting, Cuff Size: Adult Large)   Pulse 73   Temp 97.8  F (36.6  C) (Temporal)   Wt 61.7 kg (136 lb 2 oz)   SpO2 96%   BMI 23.37 kg/m    Body mass index is 23.37 kg/m .  Physical Exam   GENERAL: alert and no distress  EYES: Eyes grossly normal to inspection  PSYCH: mentation appears normal, affect normal/bright    Results for orders placed or performed in visit on 03/30/21   UA reflex to Microscopic and Culture      Status: Abnormal    Specimen: Midstream Urine   Result Value Ref Range    Color Urine Yellow     Appearance Urine Clear     Glucose Urine Negative NEG^Negative mg/dL    Bilirubin Urine Negative NEG^Negative    Ketones Urine Negative NEG^Negative mg/dL    Specific Gravity Urine 1.010 1.003 - 1.035    Blood Urine Moderate (A) NEG^Negative    pH Urine 6.5 5.0 - 7.0 pH    Protein Albumin Urine Negative NEG^Negative mg/dL    Urobilinogen Urine 0.2 0.2 - 1.0 EU/dL    Nitrite Urine Negative NEG^Negative    Leukocyte Esterase Urine Trace (A) NEG^Negative    Source Midstream Urine    Urine Microscopic     Status: Abnormal   Result Value Ref Range    WBC Urine 0 - 5 OTO5^0 - 5 /HPF    RBC Urine 5-10 (A) OTO2^O - 2 /HPF    Squamous Epithelial /LPF Urine Few FEW^Few /LPF

## 2021-03-30 ENCOUNTER — OFFICE VISIT (OUTPATIENT)
Dept: FAMILY MEDICINE | Facility: CLINIC | Age: 76
End: 2021-03-30
Payer: COMMERCIAL

## 2021-03-30 VITALS
DIASTOLIC BLOOD PRESSURE: 77 MMHG | HEART RATE: 73 BPM | WEIGHT: 136.13 LBS | TEMPERATURE: 97.8 F | SYSTOLIC BLOOD PRESSURE: 126 MMHG | BODY MASS INDEX: 23.37 KG/M2 | OXYGEN SATURATION: 96 %

## 2021-03-30 DIAGNOSIS — R30.0 DYSURIA: Primary | ICD-10-CM

## 2021-03-30 LAB
ALBUMIN UR-MCNC: NEGATIVE MG/DL
APPEARANCE UR: CLEAR
BILIRUB UR QL STRIP: NEGATIVE
COLOR UR AUTO: YELLOW
GLUCOSE UR STRIP-MCNC: NEGATIVE MG/DL
HGB UR QL STRIP: ABNORMAL
KETONES UR STRIP-MCNC: NEGATIVE MG/DL
LEUKOCYTE ESTERASE UR QL STRIP: ABNORMAL
NITRATE UR QL: NEGATIVE
NON-SQ EPI CELLS #/AREA URNS LPF: ABNORMAL /LPF
PH UR STRIP: 6.5 PH (ref 5–7)
RBC #/AREA URNS AUTO: ABNORMAL /HPF
SOURCE: ABNORMAL
SP GR UR STRIP: 1.01 (ref 1–1.03)
UROBILINOGEN UR STRIP-ACNC: 0.2 EU/DL (ref 0.2–1)
WBC #/AREA URNS AUTO: ABNORMAL /HPF

## 2021-03-30 PROCEDURE — 81001 URINALYSIS AUTO W/SCOPE: CPT | Performed by: PHYSICIAN ASSISTANT

## 2021-03-30 PROCEDURE — 99213 OFFICE O/P EST LOW 20 MIN: CPT | Performed by: PHYSICIAN ASSISTANT

## 2021-03-30 RX ORDER — SULFAMETHOXAZOLE/TRIMETHOPRIM 800-160 MG
1 TABLET ORAL 2 TIMES DAILY
Qty: 6 TABLET | Refills: 0 | Status: SHIPPED | OUTPATIENT
Start: 2021-03-30 | End: 2021-04-02

## 2021-04-02 ENCOUNTER — TELEPHONE (OUTPATIENT)
Dept: FAMILY MEDICINE | Facility: CLINIC | Age: 76
End: 2021-04-02

## 2021-04-02 NOTE — TELEPHONE ENCOUNTER
Reason for Call:  Other call back and symptoms not better    Detailed comments: patient was seen on 3/30/21 by Hal Tenorio and he sent a mychart msg today -- Hi Dr. Tenorio, I saw you Tuesday noon and you diagnosed a UTI. I started taking Sulfamethoxazole that afternoon, and finished my last pill yesterday evening. You said to check back today if it wasn't cleared up. I am still having pink color in my urine every time this morning. Should I come in Monday if it hasn't cleared? Thanks, Bear Guido  requesting a call back      Phone Number Patient can be reached at: Home number on file [phone] (home)    Best Time: Today    Can we leave a detailed message on this number? Not Applicable    Call taken on 4/2/2021 at 1:22 PM by Loren Adam

## 2021-04-05 ENCOUNTER — TELEPHONE (OUTPATIENT)
Dept: FAMILY MEDICINE | Facility: CLINIC | Age: 76
End: 2021-04-05

## 2021-04-05 ENCOUNTER — TELEPHONE (OUTPATIENT)
Dept: UROLOGY | Facility: CLINIC | Age: 76
End: 2021-04-05

## 2021-04-05 DIAGNOSIS — N20.0 NEPHROLITHIASIS: ICD-10-CM

## 2021-04-05 DIAGNOSIS — Z87.448 HISTORY OF HEMATURIA: Primary | ICD-10-CM

## 2021-04-05 NOTE — TELEPHONE ENCOUNTER
Reason for Call:  Other call back and referral for Neurologist    Detailed comments: patient was seen at [facility] on 4/2/21 previous;ly seen here on 3/30/21  Hospital said he needs a referal for a Neuorologist please call and advise    Phone Number Patient can be reached at: Home number on file [phone] (home)    Best Time: Today    Can we leave a detailed message on this number? YES    Call taken on 4/5/2021 at 1:14 PM by Loren Adam

## 2021-04-05 NOTE — TELEPHONE ENCOUNTER
JF,   Please see below -   Pt in ER for kidney stones though   Looks like urology recommended  Pt wants referral in [place]  Please advise  Thanks,  Tanisha ALMARAZ RN

## 2021-04-05 NOTE — TELEPHONE ENCOUNTER
[facility] Call Center    Phone Message    May a detailed message be left on voicemail: yes     Reason for Call: Appointment Intake    Referring Provider Name: Russell Alcantara  Diagnosis and/or Symptoms: Hematuria, unspecified type    Action Taken: Message routed to:  [facility] ua uro    Travel Screening: Not Applicable

## 2021-04-05 NOTE — TELEPHONE ENCOUNTER
Pt is calling again to schedule an urgent referral for potential bladder cancer, he had significant amount of blood in his urine as well as abdominal pain, please review ED med recs from [facility],he needs an appt asap, please call Bear, thank you

## 2021-04-06 DIAGNOSIS — R31.0 GROSS HEMATURIA: Primary | ICD-10-CM

## 2021-04-07 ENCOUNTER — HOSPITAL ENCOUNTER (OUTPATIENT)
Dept: CT IMAGING | Facility: CLINIC | Age: 76
Discharge: HOME | End: 2021-04-07
Attending: STUDENT IN AN ORGANIZED HEALTH CARE EDUCATION/TRAINING PROGRAM | Admitting: STUDENT IN AN ORGANIZED HEALTH CARE EDUCATION/TRAINING PROGRAM
Payer: COMMERCIAL

## 2021-04-07 ENCOUNTER — OFFICE VISIT (OUTPATIENT)
Dept: UROLOGY | Facility: CLINIC | Age: 76
End: 2021-04-07
Payer: COMMERCIAL

## 2021-04-07 VITALS
WEIGHT: 135 LBS | DIASTOLIC BLOOD PRESSURE: 70 MMHG | SYSTOLIC BLOOD PRESSURE: 118 MMHG | HEART RATE: 76 BPM | BODY MASS INDEX: 22.49 KG/M2 | HEIGHT: 65 IN | OXYGEN SATURATION: 97 %

## 2021-04-07 DIAGNOSIS — R31.0 GROSS HEMATURIA: ICD-10-CM

## 2021-04-07 LAB
ALBUMIN UR-MCNC: NEGATIVE MG/DL
APPEARANCE UR: CLEAR
BILIRUB UR QL STRIP: NEGATIVE
COLOR UR AUTO: YELLOW
CREAT BLD-MCNC: 1.2 MG/DL (ref 0.66–1.25)
GFR SERPL CREATININE-BSD FRML MDRD: 59 ML/MIN/{1.73_M2}
GLUCOSE UR STRIP-MCNC: NEGATIVE MG/DL
HGB UR QL STRIP: ABNORMAL
KETONES UR STRIP-MCNC: NEGATIVE MG/DL
LEUKOCYTE ESTERASE UR QL STRIP: NEGATIVE
NITRATE UR QL: NEGATIVE
PH UR STRIP: 7 PH (ref 5–7)
SOURCE: ABNORMAL
SP GR UR STRIP: 1.02 (ref 1–1.03)
UROBILINOGEN UR STRIP-ACNC: 0.2 EU/DL (ref 0.2–1)

## 2021-04-07 PROCEDURE — 250N000011 HC RX IP 250 OP 636: Performed by: STUDENT IN AN ORGANIZED HEALTH CARE EDUCATION/TRAINING PROGRAM

## 2021-04-07 PROCEDURE — 74178 CT ABD&PLV WO CNTR FLWD CNTR: CPT

## 2021-04-07 PROCEDURE — 99203 OFFICE O/P NEW LOW 30 MIN: CPT | Performed by: STUDENT IN AN ORGANIZED HEALTH CARE EDUCATION/TRAINING PROGRAM

## 2021-04-07 PROCEDURE — 81003 URINALYSIS AUTO W/O SCOPE: CPT | Performed by: STUDENT IN AN ORGANIZED HEALTH CARE EDUCATION/TRAINING PROGRAM

## 2021-04-07 PROCEDURE — 82565 ASSAY OF CREATININE: CPT

## 2021-04-07 PROCEDURE — 250N000009 HC RX 250: Performed by: STUDENT IN AN ORGANIZED HEALTH CARE EDUCATION/TRAINING PROGRAM

## 2021-04-07 RX ORDER — IOPAMIDOL 755 MG/ML
120 INJECTION, SOLUTION INTRAVASCULAR ONCE
Status: COMPLETED | OUTPATIENT
Start: 2021-04-07 | End: 2021-04-07

## 2021-04-07 RX ADMIN — IOPAMIDOL 120 ML: 755 INJECTION, SOLUTION INTRAVENOUS at 17:13

## 2021-04-07 RX ADMIN — SODIUM CHLORIDE 100 ML: 9 INJECTION, SOLUTION INTRAVENOUS at 17:14

## 2021-04-07 ASSESSMENT — PAIN SCALES - GENERAL: PAINLEVEL: NO PAIN (0)

## 2021-04-07 ASSESSMENT — MIFFLIN-ST. JEOR: SCORE: 1279.24

## 2021-04-07 NOTE — LETTER
"4/7/2021       RE: Bear Guido  [address] [place] [facility] [address]     Dear Colleague,    Thank you for referring your patient, Bear Guido, to the [facility] [place] at [facility]. Please see a copy of my visit note below.    [facility]  Main Office: [address]  [address]  [place] [address]       CHIEF COMPLAINT:  Gross hematuria, right renal colic    HISTORY:   76 yo M with gross hematuria, right renal colic    Seen in the ER 4/2/2021, see note from that encounter    \"The patient began experiencing hematuria four days ago and was placed on a sulfa antibiotic by his PCP three days ago. He finished his course of antibiotics yesterday and today, began experiencing hematuria again. He states that he has had hematuria all day and then began passing blood clots during urination around 13:00 this afternoon. He also mentions that after passing these clots, he experienced onset of mild lightheadedness, dizziness, right lower quadrant pain, nausea, and three episodes of vomiting. His right lower quadrant pain has been worsening throughout the afternoon, it was a 4/10 upon initial arrival to the ED and is now a 7 or 8/10 at the time of my exam. The pain has been on and off but worse in the last half hour and he has been trying to move around to get comfortable.\"    Today in the office patient recounts the same history. He also shows some pictures of gross hematuria in the toilet, note some clots in the toilet.    Went to ER. CT with findings of possible recently passed stone. Felt better, pain resolved. Last episode of gross hematuria was two days ago.    No prior personal or family history of kidney stones. No UTI. Denies any urinary difficulty.     Nonsmoker. No FH of  malignancy    PAST MEDICAL HISTORY:   Past Medical History:   Diagnosis Date     Deafness in right ear 2002     HL (hearing loss)     Left     Palpitations      Ringing " in ears      SVT (supraventricular tachycardia) (H)      Tachycardia      Uncomplicated asthma        PAST SURGICAL HISTORY:   Past Surgical History:   Procedure Laterality Date     COLONOSCOPY N/A 10/10/2017    Procedure: COLONOSCOPY;  colonoscopy;  Surgeon: Ervin Irizarry MD;  Location:  GI     ENT SURGERY  1979    ear surgery       FAMILY HISTORY:   Family History   Problem Relation Age of Onset     High cholesterol Mother      Allergies Mother      Hyperlipidemia Mother      Cancer Father 62        Lung cancer as a result of smoking     Asthma Father      High cholesterol Father      Hyperlipidemia Father      Heart Disease Maternal Grandfather      Cerebrovascular Disease Maternal Grandfather      Heart Disease Paternal Grandmother      Coronary Artery Disease Paternal Grandmother      Heart Disease Paternal Grandfather      Coronary Artery Disease Paternal Grandfather        SOCIAL HISTORY:   Social History     Tobacco Use     Smoking status: Never Smoker     Smokeless tobacco: Never Used   Substance Use Topics     Alcohol use: Yes     Alcohol/week: 0.0 standard drinks     Comment: 1 - 2 drinks daily in summer, less in winter          Allergies   Allergen Reactions     Cyclobenzaprine Unknown     Flushing     Seasonal Allergies      Spring and fall allergies         Current Outpatient Medications:      albuterol (PROAIR HFA, PROVENTIL HFA, VENTOLIN HFA) 108 (90 BASE) MCG/ACT inhaler, Inhale 2 puffs into the lungs every 6 hours as needed for shortness of breath / dyspnea or wheezing, Disp: 3 Inhaler, Rfl: 1     atorvastatin (LIPITOR) 10 MG tablet, Take 1 tablet (10 mg) by mouth daily, Disp: 90 tablet, Rfl: 2     fluticasone (FLONASE) 50 MCG/ACT spray, Spray 1 spray into both nostrils as needed for rhinitis or allergies, Disp: , Rfl:      fluticasone-vilanterol (BREO ELLIPTA) 200-25 MCG/INH inhaler, Inhale 1 puff into the lungs daily, Disp: , Rfl:      ibuprofen (ADVIL/MOTRIN) 400 MG tablet, Take 1 tablet  "(400 mg) by mouth every 6 hours as needed for moderate pain, Disp:  , Rfl:      montelukast (SINGULAIR) 10 MG tablet, Take 1 tablet (10 mg) by mouth At Bedtime, Disp: 90 tablet, Rfl: 0     prednisoLONE acetate (PRED FORTE) 1 % ophthalmic suspension, Place 6 drops into the right ear 2 times daily, Disp: 10 mL, Rfl: 0     VITAMIN D PO, Take 1 tablet by mouth daily, Disp: , Rfl:      zolpidem (AMBIEN) 5 MG tablet, Take 1 tablet (5 mg) by mouth nightly as needed for sleep, Disp: 15 tablet, Rfl: 1    Review Of Systems:  Skin: No rash, pruritis, or skin pigmentation  Eyes: No changes in vision  Ears/Nose/Throat: No changes in hearing, no nosebleeds  Respiratory: No shortness of breath, dyspnea on exertion, cough, or hemoptysis  Cardiovascular: No chest pain or palpitations  Gastrointestinal: No diarrhea or constipation. No abdominal pain. No hematochezia  Genitourinary: see HPI  Musculoskeletal: No pain or swelling of joints, normal range of motion  Neurologic: No weakness or tremors  Psychiatric: No recent changes in memory or mood  Hematologic/Lymphatic/Immunologic: No easy bruising or enlarged lymph nodes  Endocrine: No weight gain or loss      PHYSICAL EXAM:    /70 (BP Location: Left arm, Patient Position: Sitting, Cuff Size: Adult Regular)   Pulse 76   Ht 1.651 m (5' 5\")   Wt 61.2 kg (135 lb)   SpO2 97%   BMI 22.47 kg/m    General appearance: In NAD, conversant  HEENT: Normocephalic and atraumatic, anicteric sclera  Cardiovascular: Not examined  Respiratory: normal, non-labored breathing  Gastrointestinal: negative, Abdomen soft, non-tender, and non-distended.   Musculoskeletal: Not Examined  Peripheral Vascular/extremity: No peripheral edema  Skin: Normal temperature, turgor, and texture. No rash  Psychiatric: Appropriate affect, alert and oriented to person, place, and time    Penis: uncirc phallus  Scrotal Skin: normal  Testicles: normal if somewhat atrophic bilat  Epididymis: normal bilat  Lymphatic: " Not examined  Digital Rectal Exam: tight rectal tone, ~45 gram prostate, anodular, firm, symmetric    Cystoscopy: Not done    UA RESULTS:  Recent Labs   Lab Test 04/07/21  0830 03/30/21  1230   COLOR Yellow Yellow   APPEARANCE Clear Clear   URINEGLC Negative Negative   URINEBILI Negative Negative   URINEKETONE Negative Negative   SG 1.020 1.010   UBLD Large* Moderate*   URINEPH 7.0 6.5   PROTEIN Negative Negative   UROBILINOGEN 0.2 0.2   NITRITE Negative Negative   LEUKEST Negative Trace*   RBCU  --  5-10*   WBCU  --  0 - 5       Bladder Scan:     Other Labs:      Imaging Studies: outside CT abd/pelvis w/o contrast 4/2/2021 report reviewed    assymetric enlargement of the right kidney, perinephric stranding and mild hydronephrosis and hydroureter extending to the right UVJ. No obstrutive stone or stricture. Findings suspicious for recently passed right ureteral stone. Occult distal stricture or obstruction is not excluded. Pyelonephritis is not excluded      CLINICAL IMPRESSION:   74 yo M with gross hematuria, right renal colic. Likely hematuria was from a passed stone but the hematuria in the toilet which the patient showed me seems out of proportion to simply being from a stone    PLAN:   CT urogram  Return for cystoscopy    John Fisher MD   [facility]  [phone] clinic phone

## 2021-04-07 NOTE — PATIENT INSTRUCTIONS
CT urogram  Return for cystoscopy      Patient Education     Cystoscopy    Cystoscopy is a procedure that lets your healthcare provider look directly inside your urethra and bladder. It can be used to:     Help diagnose a problem with your urethra, bladder, or kidneys.    Take a sample (biopsy) of bladder or urethral tissue.    Treat certain problems (such as removing kidney stones).    Place a tube (stent) to bypass a blockage.    Take special X-rays of the kidneys.  Based on the findings, your provider may advise other tests or treatments.   What is a cystoscope?  A cystoscope is a telescope-like tube that contains lenses and small glass wires that make bright light (fiberoptics). The cystoscope may be straight and rigid. Or it may be flexible to bend around curves in the urethra. The healthcare provider may look directly into the cystoscope, or project the image onto a screen.   Getting ready    Ask your healthcare provider if you should stop taking any medicines before the procedure.    Follow any directions you are given for not eating or drinking before the procedure.    Follow any other instructions your healthcare provider gives you.    Tell your healthcare provider before the exam if you:     Take any medicines, such as aspirin or blood thinners. This also includes any over-the-counter and prescription medicines, vitamins, herbs, and other supplements.    Have allergies to any medicines    Are pregnant or think you may be pregnant    During the procedure  Cystoscopy is done in the healthcare provider s office, surgery center, or hospital. The doctor and a nurse are present during the procedure. It takes only a few minutes. It takes longer if a biopsy, X-ray, or treatment needs to be done.   During the procedure:    You lie on an exam table on your back, knees bent and legs apart. You are covered with a drape.    Your urethra and the area around it are washed. Anesthetic jelly may be applied to numb the  urethra. Other pain medicine is often not needed. In some cases, you may be offered a mild sedative to help you relax. If a more extensive procedure is to be done, such as a biopsy or kidney stone removal, general anesthesia may be needed. Often a one-time antibiotic is given.    The cystoscope is inserted. A sterile fluid is put into the bladder to expand it. You may feel pressure from this fluid.    When the procedure is done, the cystoscope is removed.  After the procedure  If you had a sedative, general anesthesia, or spinal anesthesia, you must have someone drive you home. Once you re home:     Drink plenty of fluids.    You may have burning or light bleeding when you urinate. This is normal.    Medicines may be prescribed to ease any mild pain or prevent infection. Take these as directed.    Call your healthcare provider if you have heavy bleeding or blood clots, burning that lasts more than a day, a fever over  100  F  ( 38 C ), or trouble urinating.  [employer] last reviewed this educational content on 10/1/2019    6534-8673 [employer]. All rights reserved. This information is not intended as a substitute for professional medical care. Always follow your healthcare professional's instructions.

## 2021-04-07 NOTE — NURSING NOTE
Chief Complaint   Patient presents with     Hematuria     last time patient saw blood was 4/5/21 mid day   Mireya Piña LPN

## 2021-04-07 NOTE — PROGRESS NOTES
"[facility]  Main Office: [address]  [address]  [place] [address]       CHIEF COMPLAINT:  Gross hematuria, right renal colic    HISTORY:   76 yo M with gross hematuria, right renal colic    Seen in the ER 4/2/2021, see note from that encounter    \"The patient began experiencing hematuria four days ago and was placed on a sulfa antibiotic by his PCP three days ago. He finished his course of antibiotics yesterday and today, began experiencing hematuria again. He states that he has had hematuria all day and then began passing blood clots during urination around 13:00 this afternoon. He also mentions that after passing these clots, he experienced onset of mild lightheadedness, dizziness, right lower quadrant pain, nausea, and three episodes of vomiting. His right lower quadrant pain has been worsening throughout the afternoon, it was a 4/10 upon initial arrival to the ED and is now a 7 or 8/10 at the time of my exam. The pain has been on and off but worse in the last half hour and he has been trying to move around to get comfortable.\"    Today in the office patient recounts the same history. He also shows some pictures of gross hematuria in the toilet, note some clots in the toilet.    Went to ER. CT with findings of possible recently passed stone. Felt better, pain resolved. Last episode of gross hematuria was two days ago.    No prior personal or family history of kidney stones. No UTI. Denies any urinary difficulty.     Nonsmoker. No FH of  malignancy    PAST MEDICAL HISTORY:   Past Medical History:   Diagnosis Date     Deafness in right ear 2002     HL (hearing loss)     Left     Palpitations      Ringing in ears      SVT (supraventricular tachycardia) (H)      Tachycardia      Uncomplicated asthma        PAST SURGICAL HISTORY:   Past Surgical History:   Procedure Laterality Date     COLONOSCOPY N/A 10/10/2017    Procedure: COLONOSCOPY;  colonoscopy;  Surgeon: Ervin Irizarry MD;  Location: [facility]"     ENT SURGERY  1979    ear surgery       FAMILY HISTORY:   Family History   Problem Relation Age of Onset     High cholesterol Mother      Allergies Mother      Hyperlipidemia Mother      Cancer Father 62        Lung cancer as a result of smoking     Asthma Father      High cholesterol Father      Hyperlipidemia Father      Heart Disease Maternal Grandfather      Cerebrovascular Disease Maternal Grandfather      Heart Disease Paternal Grandmother      Coronary Artery Disease Paternal Grandmother      Heart Disease Paternal Grandfather      Coronary Artery Disease Paternal Grandfather        SOCIAL HISTORY:   Social History     Tobacco Use     Smoking status: Never Smoker     Smokeless tobacco: Never Used   Substance Use Topics     Alcohol use: Yes     Alcohol/week: 0.0 standard drinks     Comment: 1 - 2 drinks daily in summer, less in winter          Allergies   Allergen Reactions     Cyclobenzaprine Unknown     Flushing     Seasonal Allergies      Spring and fall allergies         Current Outpatient Medications:      albuterol (PROAIR HFA, PROVENTIL HFA, VENTOLIN HFA) 108 (90 BASE) MCG/ACT inhaler, Inhale 2 puffs into the lungs every 6 hours as needed for shortness of breath / dyspnea or wheezing, Disp: 3 Inhaler, Rfl: 1     atorvastatin (LIPITOR) 10 MG tablet, Take 1 tablet (10 mg) by mouth daily, Disp: 90 tablet, Rfl: 2     fluticasone (FLONASE) 50 MCG/ACT spray, Spray 1 spray into both nostrils as needed for rhinitis or allergies, Disp: , Rfl:      fluticasone-vilanterol (BREO ELLIPTA) 200-25 MCG/INH inhaler, Inhale 1 puff into the lungs daily, Disp: , Rfl:      ibuprofen (ADVIL/MOTRIN) 400 MG tablet, Take 1 tablet (400 mg) by mouth every 6 hours as needed for moderate pain, Disp:  , Rfl:      montelukast (SINGULAIR) 10 MG tablet, Take 1 tablet (10 mg) by mouth At Bedtime, Disp: 90 tablet, Rfl: 0     prednisoLONE acetate (PRED FORTE) 1 % ophthalmic suspension, Place 6 drops into the right ear 2 times daily,  "Disp: 10 mL, Rfl: 0     VITAMIN D PO, Take 1 tablet by mouth daily, Disp: , Rfl:      zolpidem (AMBIEN) 5 MG tablet, Take 1 tablet (5 mg) by mouth nightly as needed for sleep, Disp: 15 tablet, Rfl: 1    Review Of Systems:  Skin: No rash, pruritis, or skin pigmentation  Eyes: No changes in vision  Ears/Nose/Throat: No changes in hearing, no nosebleeds  Respiratory: No shortness of breath, dyspnea on exertion, cough, or hemoptysis  Cardiovascular: No chest pain or palpitations  Gastrointestinal: No diarrhea or constipation. No abdominal pain. No hematochezia  Genitourinary: see HPI  Musculoskeletal: No pain or swelling of joints, normal range of motion  Neurologic: No weakness or tremors  Psychiatric: No recent changes in memory or mood  Hematologic/Lymphatic/Immunologic: No easy bruising or enlarged lymph nodes  Endocrine: No weight gain or loss      PHYSICAL EXAM:    /70 (BP Location: Left arm, Patient Position: Sitting, Cuff Size: Adult Regular)   Pulse 76   Ht 1.651 m (5' 5\")   Wt 61.2 kg (135 lb)   SpO2 97%   BMI 22.47 kg/m    General appearance: In NAD, conversant  HEENT: Normocephalic and atraumatic, anicteric sclera  Cardiovascular: Not examined  Respiratory: normal, non-labored breathing  Gastrointestinal: negative, Abdomen soft, non-tender, and non-distended.   Musculoskeletal: Not Examined  Peripheral Vascular/extremity: No peripheral edema  Skin: Normal temperature, turgor, and texture. No rash  Psychiatric: Appropriate affect, alert and oriented to person, place, and time    Penis: uncirc phallus  Scrotal Skin: normal  Testicles: normal if somewhat atrophic bilat  Epididymis: normal bilat  Lymphatic: Not examined  Digital Rectal Exam: tight rectal tone, ~45 gram prostate, anodular, firm, symmetric    Cystoscopy: Not done    UA RESULTS:  Recent Labs   Lab Test 04/07/21  0830 03/30/21  1230   COLOR Yellow Yellow   APPEARANCE Clear Clear   URINEGLC Negative Negative   URINEBILI Negative Negative "   URINEKETONE Negative Negative   SG 1.020 1.010   UBLD Large* Moderate*   URINEPH 7.0 6.5   PROTEIN Negative Negative   UROBILINOGEN 0.2 0.2   NITRITE Negative Negative   LEUKEST Negative Trace*   RBCU  --  5-10*   WBCU  --  0 - 5       Bladder Scan:     Other Labs:      Imaging Studies: outside CT abd/pelvis w/o contrast 4/2/2021 report reviewed    assymetric enlargement of the right kidney, perinephric stranding and mild hydronephrosis and hydroureter extending to the right UVJ. No obstrutive stone or stricture. Findings suspicious for recently passed right ureteral stone. Occult distal stricture or obstruction is not excluded. Pyelonephritis is not excluded      CLINICAL IMPRESSION:   74 yo M with gross hematuria, right renal colic. Likely hematuria was from a passed stone but the hematuria in the toilet which the patient showed me seems out of proportion to simply being from a stone    PLAN:   CT urogram  Return for cystoscopy    John Fisher MD   [facility]  [phone] clinic phone

## 2021-04-08 DIAGNOSIS — R31.0 GROSS HEMATURIA: Primary | ICD-10-CM

## 2021-04-12 ENCOUNTER — OFFICE VISIT (OUTPATIENT)
Dept: UROLOGY | Facility: CLINIC | Age: 76
End: 2021-04-12
Payer: COMMERCIAL

## 2021-04-12 VITALS
HEART RATE: 75 BPM | SYSTOLIC BLOOD PRESSURE: 120 MMHG | HEIGHT: 65 IN | WEIGHT: 135 LBS | DIASTOLIC BLOOD PRESSURE: 80 MMHG | OXYGEN SATURATION: 96 % | BODY MASS INDEX: 22.49 KG/M2

## 2021-04-12 DIAGNOSIS — R31.0 GROSS HEMATURIA: Primary | ICD-10-CM

## 2021-04-12 LAB
ALBUMIN UR-MCNC: NEGATIVE MG/DL
APPEARANCE UR: CLEAR
BILIRUB UR QL STRIP: NEGATIVE
COLOR UR AUTO: YELLOW
GLUCOSE UR STRIP-MCNC: NEGATIVE MG/DL
HGB UR QL STRIP: ABNORMAL
KETONES UR STRIP-MCNC: NEGATIVE MG/DL
LEUKOCYTE ESTERASE UR QL STRIP: ABNORMAL
NITRATE UR QL: NEGATIVE
PH UR STRIP: 5.5 PH (ref 5–7)
SOURCE: ABNORMAL
SP GR UR STRIP: 1.01 (ref 1–1.03)
UROBILINOGEN UR STRIP-ACNC: 0.2 EU/DL (ref 0.2–1)

## 2021-04-12 PROCEDURE — 52000 CYSTOURETHROSCOPY: CPT | Performed by: STUDENT IN AN ORGANIZED HEALTH CARE EDUCATION/TRAINING PROGRAM

## 2021-04-12 PROCEDURE — 81003 URINALYSIS AUTO W/O SCOPE: CPT | Performed by: STUDENT IN AN ORGANIZED HEALTH CARE EDUCATION/TRAINING PROGRAM

## 2021-04-12 RX ORDER — LIDOCAINE HYDROCHLORIDE 20 MG/ML
JELLY TOPICAL ONCE
Status: DISCONTINUED | OUTPATIENT
Start: 2021-04-12 | End: 2021-05-11 | Stop reason: HOSPADM

## 2021-04-12 ASSESSMENT — MIFFLIN-ST. JEOR: SCORE: 1279.24

## 2021-04-12 ASSESSMENT — PAIN SCALES - GENERAL: PAINLEVEL: NO PAIN (0)

## 2021-04-12 NOTE — PATIENT INSTRUCTIONS

## 2021-04-12 NOTE — NURSING NOTE
Chief Complaint   Patient presents with     Gross Hematuria     Here for in office cystoscopy     Prior to the start of the procedure and with procedural staff participation, I verbally confirmed the patient s identity using two indicators, relevant allergies, that the procedure was appropriate and matched the consent or emergent situation, and that the correct equipment/implants were available. Immediately prior to starting the procedure I conducted the Time Out with the procedural staff and re-confirmed the patient s name, procedure, and site/side. I have wiped the patient off with the povidone-Iodine solution, draped them,  used Lidocaine hydrochloride jelly, and instilled sterile water into the bladder. (The Joint Commission universal protocol was followed.)  Yes    Sedation (Moderate or Deep): Urojet    5mL 2% lidocaine hydrochloride Urojet instilled into urethra.    NDC# [address]-1  Lot #: [id number]  Expiration Date:  7-22    Angeles Beal

## 2021-04-12 NOTE — LETTER
4/12/2021       RE: Bear Guido  [address] [id number]     Dear Colleague,    Thank you for referring your patient, Bear Guido, to the [facility] [place] at [facility]. Please see a copy of my visit note below.    PRE-PROCEDURE DIAGNOSIS: gross hematuria    POST-PROCEDURE DIAGNOSIS: gross hematuria    PROCEDURE: Cystoscopy    HISTORY: 76 yo M with gross hematuria, right renal colic. CT w/o contrast on 4/2/2021 showed no obstructing stone but assymmetric enlargement of the right kidney and hydroureteronephrosis down to the right ureterovesical junction.    REVIEW OF OFFICE STUDIES (e.g., uroflow or PVR): CT urogram 4/7/2021  IMPRESSION:   1.  Striated right nephrogram suggestive of acute right  pyelonephritis. The mild perinephric stranding seen on 4/2/2021 has  resolved. Mild linear urothelial thickening in the right renal pelvis,  likely inflammatory.  2.  No urinary system calculi. No suspicious renal masses. No  suspicious filling defects in the renal collecting system and ureters.  3.  Incidental small distal esophageal possible varices.    DESCRIPTION OF PROCEDURE: After informed consent was obtained, the patient was brought to the procedure room where he was placed in the supine position with all pressure points well padded.  The penis was prepped and draped in sterile fashion. A flexible cystoscope was introduced through a well-lubricated urethra.     Anterior urethra normal    Prostate short about 3 cm with bilobar moderately obstructive hypertrophy, slightly elevated bladder neck  Mild trabeculation, no cellules or diverticulae  No concerning urothelial lesions    The flexible cystoscope was removed and the findings were described to the patient.     ASSESSMENT AND PLAN: 76 yo M with gross hematuria, right renal colic. CT w/o contrast on 4/2/2021 showed no obstructing stone but assymmetric enlargement of the right kidney and  hydroureteronephrosis down to the right ureterovesical junction. Repeat CT scan showed a striated right nephrogram with some urothelial thickening of the right renal pelvis, possibly inflammatory. Cystoscopy with no concerning cause of gross hematuria. Will get repeat imaging to ensure resolution of the inflammatory changes as he is currently asymptomatic     - follow up in 1 month with repeat CT scan prior    John Fisher MD   [facility]  [phone] clinic phone

## 2021-04-12 NOTE — PROGRESS NOTES
PRE-PROCEDURE DIAGNOSIS: gross hematuria    POST-PROCEDURE DIAGNOSIS: gross hematuria    PROCEDURE: Cystoscopy    HISTORY: 74 yo M with gross hematuria, right renal colic. CT w/o contrast on 4/2/2021 showed no obstructing stone but assymmetric enlargement of the right kidney and hydroureteronephrosis down to the right ureterovesical junction.    REVIEW OF OFFICE STUDIES (e.g., uroflow or PVR): CT urogram 4/7/2021  IMPRESSION:   1.  Striated right nephrogram suggestive of acute right  pyelonephritis. The mild perinephric stranding seen on 4/2/2021 has  resolved. Mild linear urothelial thickening in the right renal pelvis,  likely inflammatory.  2.  No urinary system calculi. No suspicious renal masses. No  suspicious filling defects in the renal collecting system and ureters.  3.  Incidental small distal esophageal possible varices.    DESCRIPTION OF PROCEDURE: After informed consent was obtained, the patient was brought to the procedure room where he was placed in the supine position with all pressure points well padded.  The penis was prepped and draped in sterile fashion. A flexible cystoscope was introduced through a well-lubricated urethra.     Anterior urethra normal    Prostate short about 3 cm with bilobar moderately obstructive hypertrophy, slightly elevated bladder neck  Mild trabeculation, no cellules or diverticulae  No concerning urothelial lesions    The flexible cystoscope was removed and the findings were described to the patient.     ASSESSMENT AND PLAN: 74 yo M with gross hematuria, right renal colic. CT w/o contrast on 4/2/2021 showed no obstructing stone but assymmetric enlargement of the right kidney and hydroureteronephrosis down to the right ureterovesical junction. Repeat CT scan showed a striated right nephrogram with some urothelial thickening of the right renal pelvis, possibly inflammatory. Cystoscopy with no concerning cause of gross hematuria. Will get repeat imaging to ensure resolution  of the inflammatory changes as he is currently asymptomatic     - follow up in 1 month with repeat CT scan prior    John Fisher MD   [facility]  [phone] clinic phone

## 2021-04-29 NOTE — NURSING NOTE
"No chief complaint on file.    /68   Pulse 72   Temp 98.5  F (36.9  C) (Oral)   Resp 12   Ht 1.626 m (5' 4\")   SpO2 96%   BMI 23.61 kg/m   Estimated body mass index is 23.61 kg/m  as calculated from the following:    Height as of this encounter: 1.626 m (5' 4\").    Weight as of 12/17/18: 62.4 kg (137 lb 9 oz).  bp completed using cuff size: regular      Health Maintenance addressed:  ACT    Possibly completing today per provider review.    Gertrudis Briones MA    " Prescription sent

## 2021-05-18 ENCOUNTER — TELEPHONE (OUTPATIENT)
Dept: UROLOGY | Facility: CLINIC | Age: 76
End: 2021-05-18

## 2021-05-18 DIAGNOSIS — R31.0 GROSS HEMATURIA: Primary | ICD-10-CM

## 2021-05-18 NOTE — TELEPHONE ENCOUNTER
----- Message from John Fisher MD sent at 5/18/2021 11:13 AM CDT -----  Regarding: follow up with me with urinalysis, BMP and CBC prior  follow up with me with urinalysis, BMP and CBC prior, in the next few weeks. Prefer in person but could be virtual    John Fisher MD   [facility]  [phone] clinic phone

## 2021-05-19 ENCOUNTER — TELEPHONE (OUTPATIENT)
Dept: UROLOGY | Facility: CLINIC | Age: 76
End: 2021-05-19

## 2021-05-19 NOTE — TELEPHONE ENCOUNTER
[facility] Call Center    Phone Message    May a detailed message be left on voicemail: yes     Reason for Call: Other: Michelle calling from [facility] to let clinic know that they approved the prior auth for CT abdomen/pelvis, code [id number], from 4/22/21 to 11/14/21. No callback needed, just wanted to let us know.     Action Taken: Message routed to:  [facility] ([facility] uro    Travel Screening: Not Applicable

## 2021-05-28 ENCOUNTER — OFFICE VISIT (OUTPATIENT)
Dept: PODIATRY | Facility: CLINIC | Age: 76
End: 2021-05-28
Payer: COMMERCIAL

## 2021-05-28 VITALS
BODY MASS INDEX: 22.53 KG/M2 | DIASTOLIC BLOOD PRESSURE: 64 MMHG | HEIGHT: 65 IN | SYSTOLIC BLOOD PRESSURE: 102 MMHG | WEIGHT: 135.2 LBS

## 2021-05-28 DIAGNOSIS — M79.672 ARCH PAIN, LEFT: ICD-10-CM

## 2021-05-28 DIAGNOSIS — M76.822 POSTERIOR TIBIAL TENDON DYSFUNCTION (PTTD) OF LEFT LOWER EXTREMITY: Primary | ICD-10-CM

## 2021-05-28 DIAGNOSIS — R31.0 GROSS HEMATURIA: ICD-10-CM

## 2021-05-28 LAB
ALBUMIN UR-MCNC: NEGATIVE MG/DL
ANION GAP SERPL CALCULATED.3IONS-SCNC: 5 MMOL/L (ref 3–14)
APPEARANCE UR: CLEAR
BILIRUB UR QL STRIP: NEGATIVE
BUN SERPL-MCNC: 12 MG/DL (ref 7–30)
CALCIUM SERPL-MCNC: 9 MG/DL (ref 8.5–10.1)
CHLORIDE SERPL-SCNC: 106 MMOL/L (ref 94–109)
CO2 SERPL-SCNC: 28 MMOL/L (ref 20–32)
COLOR UR AUTO: YELLOW
CREAT SERPL-MCNC: 0.88 MG/DL (ref 0.66–1.25)
ERYTHROCYTE [DISTWIDTH] IN BLOOD BY AUTOMATED COUNT: 13.3 % (ref 10–15)
GFR SERPL CREATININE-BSD FRML MDRD: 84 ML/MIN/{1.73_M2}
GLUCOSE SERPL-MCNC: 111 MG/DL (ref 70–99)
GLUCOSE UR STRIP-MCNC: NEGATIVE MG/DL
HCT VFR BLD AUTO: 45.4 % (ref 40–53)
HGB BLD-MCNC: 14.7 G/DL (ref 13.3–17.7)
HGB UR QL STRIP: ABNORMAL
KETONES UR STRIP-MCNC: ABNORMAL MG/DL
LEUKOCYTE ESTERASE UR QL STRIP: NEGATIVE
MCH RBC QN AUTO: 29.7 PG (ref 26.5–33)
MCHC RBC AUTO-ENTMCNC: 32.4 G/DL (ref 31.5–36.5)
MCV RBC AUTO: 92 FL (ref 78–100)
NITRATE UR QL: NEGATIVE
PH UR STRIP: 6 PH (ref 5–7)
PLATELET # BLD AUTO: 265 10E9/L (ref 150–450)
POTASSIUM SERPL-SCNC: 4.1 MMOL/L (ref 3.4–5.3)
RBC # BLD AUTO: 4.95 10E12/L (ref 4.4–5.9)
SODIUM SERPL-SCNC: 139 MMOL/L (ref 133–144)
SOURCE: ABNORMAL
SP GR UR STRIP: 1.02 (ref 1–1.03)
UROBILINOGEN UR STRIP-ACNC: 0.2 EU/DL (ref 0.2–1)
WBC # BLD AUTO: 7 10E9/L (ref 4–11)

## 2021-05-28 PROCEDURE — 36415 COLL VENOUS BLD VENIPUNCTURE: CPT | Performed by: STUDENT IN AN ORGANIZED HEALTH CARE EDUCATION/TRAINING PROGRAM

## 2021-05-28 PROCEDURE — 85027 COMPLETE CBC AUTOMATED: CPT | Performed by: STUDENT IN AN ORGANIZED HEALTH CARE EDUCATION/TRAINING PROGRAM

## 2021-05-28 PROCEDURE — 99203 OFFICE O/P NEW LOW 30 MIN: CPT | Performed by: PODIATRIST

## 2021-05-28 PROCEDURE — 80048 BASIC METABOLIC PNL TOTAL CA: CPT | Performed by: STUDENT IN AN ORGANIZED HEALTH CARE EDUCATION/TRAINING PROGRAM

## 2021-05-28 PROCEDURE — 81003 URINALYSIS AUTO W/O SCOPE: CPT | Performed by: STUDENT IN AN ORGANIZED HEALTH CARE EDUCATION/TRAINING PROGRAM

## 2021-05-28 ASSESSMENT — MIFFLIN-ST. JEOR: SCORE: 1280.14

## 2021-05-28 NOTE — PATIENT INSTRUCTIONS
Thank you for choosing [facility] Podiatry / Foot & Ankle Surgery!    DR TAYLOR'S CLINIC LOCATIONS  [facility]   [address] [facility] [id number]   [place] [address] [place] [address]   [phone]       SET UP SURGERY: [phone]    APPOINTMENTS: [phone]    BILLING QUESTIONS: [phone]    FAX NUMBER: [phone]        Follow up: 3 weeks      TENDONITIS   Tendons are the strong fibrous portions of muscles that attach to bones and allow the muscle to move a joint when it contracts. Tendons are very strong because they have a lot of force exerted on them. Sometimes tendons can become painful because they have suffered an acute injury, in which too much force was exerted at one time, or an overuse injury, in which a normal force was exerted too frequently or over a prolonged period of time. As a result, there is damage to the tendon and its surrounding soft tissue structures and they become inflammed. Because tendons do not have a great blood supply, they do not heal rapidly and the inflammation can become chronic.   Conservative treatment for tendinitis involves rest and anti-inflammatory measures. Ice is applied 15 minutes 2-3 times daily. Anti-inflammatory medications called NSAIDs (ibuprofen, example) can be taken provided they are used with caution, as they can lead to internal bleeding and increase the risk ofstroke and heart attack. Sometimes topical nitroglycerin is prescribed to help with pain. Often your doctor will use a special shoe or removable walking cast to immobilize the tendon, allowing it to heal without further damage from use. These devices are very useful in helping tendons heal, but they may slow you down or make you feel like your hip, knee, or back are out ofalignment. This is temporary and should go away once you are out ofthe immobilization. You should not use a walking cast when showering or driving. Another option is  Platelet Rich Plasma injections. (Normally done with a Sports and Orthorapedic doctor.   If conservative measures fail, your physician may need to surgically repair the tendon by removing any chronic inflammatory tissue and sewing it back together. Sometimes it is sewn to an adjacent tendon with similar function for support and sometimes it is lengthened. . Sometimes the bones around the tendon need to be realigned or reshaped to better support the tendon or prevent further damage. Your foot and ankle surgeon will discuss the specifics of your surgery with you, should you need it.      Towel stretch: Sit on a hard surface with your injured leg stretched out in front of you. Loop a towel around your toes and the ball of your foot and pull the towel toward your body keeping your leg straight. Hold this position for 15 to 30 seconds and then relax. Repeat 3 times. Then push the towel away with the ball of your foot. Repeat 3 times.  When you don't feel much of a stretch using the towel, you can start the standing calf stretch and the following exercises.    Standing calf stretch: Stand facing a wall with your hands on the wall at about eye level. Keep your injured leg back with your heel on the floor. Keep the other leg forward with the knee bent. Turn your back foot slightly inward (as if you were pigeon-toed). Slowly lean into the wall until you feel a stretch in the back of your calf. Hold the stretch for 15 to 30 seconds. Return to the starting position. Repeat 3 times. Do this exercise several times each day.     Standing soleus stretch: Stand facing a wall with your hands on the wall at about chest height. Keep your injured leg back with your heel on the floor. Keep the other leg forward with the knee bent. Turn your back foot slightly inward (as if you were pigeon-toed). Bend your back knee slightly and gently lean into the wall until you feel a stretch in the lower calf of your injured leg. Hold the stretch for  15 to 30 seconds. Return to the starting position. Repeat 3 times.     Achilles stretch: Stand with the ball of one foot on a stair. Reach for the step below with your heel until you feel a stretch in the arch of your foot. Hold this position for 15 to 30 seconds and then relax. Repeat 3 times.     Heel raise: Balance yourself while standing behind a chair or counter. Using the chair or counter as a support to help you, raise your body up onto your toes and hold for 5 seconds. Then slowly lower yourself down without holding onto the support. (It's OK to keep holding onto the support if you need to.) When this exercise becomes less painful, try lowering yourself down on the injured leg only. Repeat 15 times. Do 2 sets of 15. Rest 30 seconds between sets.     Step-up: Stand with the foot of your injured leg on a support 3 to 5 inches high (like a small step or block of wood). Keep your other foot flat on the floor. Shift your weight onto the injured leg on the support. Straighten your injured leg as the other leg comes off the floor. Return to the starting position by bending your injured leg and slowly lowering your uninjured leg back to the floor. Do 2 sets of 15.     Resisted ankle eversion: Sit with both legs stretched out in front of you, with your feet about a shoulder's width apart. Tie a loop in one end of elastic tubing. Put the foot of your injured leg through the loop so that the tubing goes around the arch of that foot and wraps around the outside of the other foot. Hold onto the other end of the tubing with your hand to provide tension. Turn the foot of your injured leg up and out. Make sure you keep your other foot still so that it will allow the tubing to stretch as you move the foot of your injured leg. Return to the starting position. Do 2 sets of 15.     Balance and reach exercises: Stand next to a chair with your injured leg farther from the chair. The chair will provide support if you need it.  Stand on the foot of your injured leg and bend your knee slightly. Try to raise the arch of this foot while keeping your big toe on the floor. Keep your foot in this position. With the hand that is farther away from the chair, reach forward in front of you by bending at the waist. Avoid bending your knee any more as you do this. Repeat this 10 times. To make the exercise more challenging, reach farther in front of you. Do 2 sets of 10.  the same position as above. While keeping your arch height, reach the hand that is farther away from the chair across your body toward the chair. The farther you reach, the more challenging the exercise. Do 2 sets of 10.     Resisted ankle eversion: Sit with both legs stretched out in front of you, with your feet about a shoulder's width apart. Tie a loop in one end of elastic tubing. Put the foot of your injured leg through the loop so that the tubing goes around the arch of that foot and wraps around the outside of the other foot. Hold onto the other end of the tubing with your hand to provide tension. Turn the foot of your injured leg up and out. Make sure you keep your other foot still so that it will allow the tubing to stretch as you move the foot of your injured leg. Return to the starting position. Do 2 sets of 15.   If you have access to a wobble board, do the following exercises:  Wobble board exercises:     Stand on a wobble board with your feet shoulder width apart. Rock the board forwards and backwards 30 times, then side to side 30 times. Hold on to a chair if you need support.     Rotate the wobble board around so that the edge of the board is in contact with the floor at all times. Do this 30 times in a clockwise and then a counterclockwise direction.     Balance on the wobble board for as long as you can without letting the edges touch the floor. Try to do this for 2 minutes without touching the floor.     Rotate the wobble board in clockwise and  "counterclockwise circles, but do not let the edge of the board touch the floor.     When you have mastered exercises A through D, try repeating them while standing on just your injured leg.     After you are able to do these exercises on one leg, try to do them with your eyes closed. Make sure you have something nearby to support you in case you lose your balance.  PRICE Therapy    Many aches and pains throughout the foot and ankle can be helped with many simple treatments.  This is usually described as PRICE Therapy.      P - Protection - often times, inflammation/pain in the lower extremity is not able to improve simply because the areas involved are never allowed to rest.  Every step we take can bother the problematic area.  Protecting those areas is an important step in the healing process.  This may involve a walking cast boot, a special insert/orthotic device, an ankle brace, or simply avoiding barefoot walking.    R - Rest - in addition to protecting the foot/ankle, resting is an important, but often times difficult, treatment option.  Getting off your feet when they bother you, and specifically avoiding activities that cause pain/discomfort, are very beneficial to prevent, and treat, foot/ankle pain.  \"If there's something that makes it hurt(eg activities, shoe gear), and you keep doing the thing that makes it hurt, it's just going to keep hurting\".      I - Ice - icing regularly can help to decrease inflammation and swelling in the foot, thus decreasing pain.  Using an ice pack or a bag of frozen peas works very well.  Ice for 20 minutes multiple times per day as needed.  Do not place the ice directly on the skin as this can cause tissue damage.    C - Compression - using a compression wrap or an ACE wrap can help to decrease swelling, which can help to decrease pain.  Wearing the wraps is generally not needed at night, but they should be worn on a regular basis when you are going to be on your feet for " prolonged periods as gravity tends to pull fluids down to your feet/ankles.    E - Elevation - elevating your lower extremities multiple times daily for 15-20 minutes can help to decrease swelling, which works well in decreasing pain levels.      NSAID/Tylenol - An anti-inflammatory, like Aleve or ibuprofen, and/or a pain medication, such as Tylenol, can help to improve pain levels and get the issue resolved sooner rather than later.  Anyone with liver issues should be careful with Tylenol, and anyone with high blood pressure or heart, stomach or kidney issues should be careful with anti-inflammatories.  Please ask if you have questions about these medications, including dosage.    OVER THE COUNTER INSERTS    Most of these can be found at your local [employer], [employer], or online:    SuperFeet   Sofsole Fit Spenco   Power Step   Walk-Fit ([employer])  *For heel pain* Arch Cradles  *For heel pain*       ** A good high quality over the counter insert should cost around $40-$50    ** Capsulitis/Metarasalgia - try adding a metatarsal pad on your inserts or find an insert with one built in

## 2021-05-28 NOTE — PROGRESS NOTES
"Foot & Ankle Surgery  May 28, 2021    CC: \" Pain on the inside of left foot for the last 3 and half weeks, improving\"    I was asked to see Bear Guido regarding the chief complaint by:  self    HPI:  Pt is a 75 year old male who presents with above complaint.  Medial left foot pain x3 and half weeks.  No injury.  He points to the medial arch.  He had an episode of throbbing in the area and a 1 day history of tingling on the bottom of the left foot.  This is bad with walking and minimal pain with biking.  He indicates this is better without shoes.  He would like to \"walk 10 miles without pain\".  \"Inside sharp at times when walking, always sore\".  Pain 3-10 \"almost always\".  He is tried different shoes and padding.  This is not helped sufficiently    ROS:   Pos for CC.  The patient denies current nausea, vomiting, chills, fevers, belly pain, calf pain, chest pain or SOB.  Complete remainder of ROS is otherwise neg.    VITALS:  There were no vitals filed for this visit.    PMH:    Past Medical History:   Diagnosis Date     Deafness in right ear 2002     HL (hearing loss)     Left     Palpitations      Ringing in ears      SVT (supraventricular tachycardia) (H)      Tachycardia      Uncomplicated asthma        SXHX:    Past Surgical History:   Procedure Laterality Date     COLONOSCOPY N/A 10/10/2017    Procedure: COLONOSCOPY;  colonoscopy;  Surgeon: Ervin Irizarry MD;  Location:  GI     ENT SURGERY  1979    ear surgery        MEDS:    Current Outpatient Medications   Medication     albuterol (PROAIR HFA, PROVENTIL HFA, VENTOLIN HFA) 108 (90 BASE) MCG/ACT inhaler     atorvastatin (LIPITOR) 10 MG tablet     fluticasone (FLONASE) 50 MCG/ACT spray     fluticasone-vilanterol (BREO ELLIPTA) 200-25 MCG/INH inhaler     ibuprofen (ADVIL/MOTRIN) 400 MG tablet     montelukast (SINGULAIR) 10 MG tablet     prednisoLONE acetate (PRED FORTE) 1 % ophthalmic suspension     VITAMIN D PO     zolpidem (AMBIEN) 5 MG tablet     No " current facility-administered medications for this visit.        ALL:     Allergies   Allergen Reactions     Cyclobenzaprine Unknown     Flushing     Seasonal Allergies      Spring and fall allergies       FMH:    Family History   Problem Relation Age of Onset     High cholesterol Mother      Allergies Mother      Hyperlipidemia Mother      Cancer Father 62        Lung cancer as a result of smoking     Asthma Father      High cholesterol Father      Hyperlipidemia Father      Heart Disease Maternal Grandfather      Cerebrovascular Disease Maternal Grandfather      Heart Disease Paternal Grandmother      Coronary Artery Disease Paternal Grandmother      Heart Disease Paternal Grandfather      Coronary Artery Disease Paternal Grandfather        SocHx:    Social History     Socioeconomic History     Marital status:      Spouse name: Not on file     Number of children: Not on file     Years of education: Not on file     Highest education level: Not on file   Occupational History     Not on file   Social Needs     Financial resource strain: Not on file     Food insecurity     Worry: Not on file     Inability: Not on file     Transportation needs     Medical: Not on file     Non-medical: Not on file   Tobacco Use     Smoking status: Never Smoker     Smokeless tobacco: Never Used   Substance and Sexual Activity     Alcohol use: Yes     Alcohol/week: 0.0 standard drinks     Comment: 1 - 2 drinks daily in summer, less in winter     Drug use: No     Sexual activity: Yes     Partners: Female     Birth control/protection: Post-menopausal   Lifestyle     Physical activity     Days per week: Not on file     Minutes per session: Not on file     Stress: Not on file   Relationships     Social connections     Talks on phone: Not on file     Gets together: Not on file     Attends Religion service: Not on file     Active member of club or organization: Not on file     Attends meetings of clubs or organizations: Not on file      Relationship status: Not on file     Intimate partner violence     Fear of current or ex partner: Not on file     Emotionally abused: Not on file     Physically abused: Not on file     Forced sexual activity: Not on file   Other Topics Concern     Parent/sibling w/ CABG, MI or angioplasty before 65F 55M? No      Service Not Asked     Blood Transfusions Not Asked     Caffeine Concern Not Asked     Occupational Exposure Not Asked     Hobby Hazards Not Asked     Sleep Concern Not Asked     Stress Concern Not Asked     Weight Concern Not Asked     Special Diet No     Back Care Not Asked     Exercise Yes     Bike Helmet Not Asked     Seat Belt Not Asked     Self-Exams Not Asked   Social History Narrative     Not on file           EXAMINATION:  Gen:   No apparent distress  Neuro:   A&Ox3, no deficits  Psych:    Answering questions appropriately for age and situation with normal affect  Head:    NCAT  Eye:    Visual scanning without deficit  Ear:    Response to auditory stimuli wnl  Lung:    Non-labored breathing on RA noted  Abd:    NTND per patient report  Lymph:    Neg for pitting/non-pitting edema BLE  Vasc:    Pulses palpable, CFT minimally delayed  Neuro:    Light touch sensation intact to all sensory nerve distributions without paresthesias  Derm:    Neg for nodules, lesions or ulcerations  MSK:    Left lower extremity -mild pes planus.  He is tender at the navicular tuberosity/PT tendon insertion.  He is also tender along the abductor hallucis muscle belly at the level of the navicular tuberosity.  Calf:    Neg for redness, swelling or tenderness      Assessment:  75 year old male with PT tendinitis and abductor hallucis muscle belly strain in setting of pes planus      Plan:  Discussed etiologies, anatomy and options  1.  PT tendinitis and abductor hallucis muscle belly strain in setting of pes planus left lower extremity  -I personally reviewed the patient's lower extremity history pertinent to today's  visit, including imaging/labs, in preparation for initiating a treatment program.  -Tendinitis handout for patient information   -I advised comfortable accommodative shoe gear  -Minimize shoeless walking.  However, despite the fact that shoes added cushion, padding, shock absorption, arch support, the patient has minimal discomfort with shoeless walking, he can do so  -RICE/NSAID versus Tylenol as needed based on pain about which helps some  -OTC insert for arch support   -conside walking cast boot, PT, custom orthotics      Follow up: 3weeks from primary or sooner with acute issues      Patient's medical history was reviewed today      João Evans DPM FACFAS FACFAOM  Podiatric Foot & Ankle Surgeon  [facility]  [phone]    Disclaimer: This note consists of symbols derived from keyboarding, dictation and/or voice recognition software. As a result, there may be errors in the script that have gone undetected. Please consider this when interpreting information found in this chart.

## 2021-06-10 ENCOUNTER — TELEPHONE (OUTPATIENT)
Dept: UROLOGY | Facility: CLINIC | Age: 76
End: 2021-06-10

## 2021-06-10 NOTE — TELEPHONE ENCOUNTER
[facility] Call Center    Phone Message    May a detailed message be left on voicemail: yes     Reason for Call: Other: Pt returning call and stated he scheduled CT for tomorrow 6/11 and wanted to updated the care team. Update only.     Action Taken: Message routed to:  [facility]  clinical pool    Travel Screening: Not Applicable

## 2021-06-11 ENCOUNTER — HOSPITAL ENCOUNTER (OUTPATIENT)
Dept: CT IMAGING | Facility: CLINIC | Age: 76
Discharge: HOME | End: 2021-06-11
Attending: STUDENT IN AN ORGANIZED HEALTH CARE EDUCATION/TRAINING PROGRAM | Admitting: STUDENT IN AN ORGANIZED HEALTH CARE EDUCATION/TRAINING PROGRAM
Payer: COMMERCIAL

## 2021-06-11 DIAGNOSIS — R31.0 GROSS HEMATURIA: ICD-10-CM

## 2021-06-11 PROCEDURE — 250N000009 HC RX 250: Performed by: STUDENT IN AN ORGANIZED HEALTH CARE EDUCATION/TRAINING PROGRAM

## 2021-06-11 PROCEDURE — 250N000011 HC RX IP 250 OP 636: Performed by: STUDENT IN AN ORGANIZED HEALTH CARE EDUCATION/TRAINING PROGRAM

## 2021-06-11 PROCEDURE — 74178 CT ABD&PLV WO CNTR FLWD CNTR: CPT

## 2021-06-11 RX ORDER — IOPAMIDOL 755 MG/ML
120 INJECTION, SOLUTION INTRAVASCULAR ONCE
Status: COMPLETED | OUTPATIENT
Start: 2021-06-11 | End: 2021-06-11

## 2021-06-11 RX ADMIN — IOPAMIDOL 120 ML: 755 INJECTION, SOLUTION INTRAVENOUS at 16:53

## 2021-06-11 RX ADMIN — SODIUM CHLORIDE 100 ML: 9 INJECTION, SOLUTION INTRAVENOUS at 16:53

## 2021-06-21 DIAGNOSIS — J45.40 MODERATE PERSISTENT ASTHMA WITHOUT COMPLICATION: ICD-10-CM

## 2021-06-22 RX ORDER — MONTELUKAST SODIUM 10 MG/1
TABLET ORAL
Qty: 90 TABLET | Refills: 0 | Status: SHIPPED | OUTPATIENT
Start: 2021-06-22 | End: 2021-09-27

## 2021-06-22 NOTE — TELEPHONE ENCOUNTER
Approved per [facility] protocol.    Brunilda Maddox RN  [facility]

## 2021-07-05 ENCOUNTER — MYC MEDICAL ADVICE (OUTPATIENT)
Dept: FAMILY MEDICINE | Facility: CLINIC | Age: 76
End: 2021-07-05

## 2021-07-05 DIAGNOSIS — J45.40 MODERATE PERSISTENT ASTHMA WITHOUT COMPLICATION: Primary | ICD-10-CM

## 2021-07-06 NOTE — TELEPHONE ENCOUNTER
DAVINA Pink      Last Written Prescription Date:  ?  Last Fill Quantity: ?,   # refills: ?  Last Office Visit: 3/30/21  Future Office visit:       Routing refill request to provider for review/approval because:  Medication is reported/historical    Please approve if appropriate  Bhavya Vieira RN

## 2021-07-23 ENCOUNTER — MYC MEDICAL ADVICE (OUTPATIENT)
Dept: FAMILY MEDICINE | Facility: CLINIC | Age: 76
End: 2021-07-23

## 2021-08-02 ENCOUNTER — TELEPHONE (OUTPATIENT)
Dept: UROLOGY | Facility: CLINIC | Age: 76
End: 2021-08-02

## 2021-08-02 ENCOUNTER — MYC MEDICAL ADVICE (OUTPATIENT)
Dept: FAMILY MEDICINE | Facility: CLINIC | Age: 76
End: 2021-08-02

## 2021-08-02 DIAGNOSIS — J45.40 MODERATE PERSISTENT ASTHMA WITHOUT COMPLICATION: ICD-10-CM

## 2021-08-02 NOTE — TELEPHONE ENCOUNTER
[facility] Call Center    Phone Message    May a detailed message be left on voicemail: yes     Reason for Call: Other: PT Bear called regarding CT scans he had 6/11, and missed follow-up with Dr. Fisher due to a family emergency. Per PT requests callbck regarding results. Pls call PT to discuss.     Action Taken: Other: [facility]    Travel Screening: Not Applicable

## 2021-08-10 ENCOUNTER — TELEPHONE (OUTPATIENT)
Dept: FAMILY MEDICINE | Facility: CLINIC | Age: 76
End: 2021-08-10

## 2021-08-10 DIAGNOSIS — J45.40 MODERATE PERSISTENT ASTHMA WITHOUT COMPLICATION: ICD-10-CM

## 2021-08-10 NOTE — TELEPHONE ENCOUNTER
Patient walked in to clinic to discuss Breo Ellipta Rx.  [employer] will cover 3 inhalers at a time - can get 3 for price of 2.    See recently [employer] messages.    Rx sent to [facility] in [employer] for 3 inhalers with 1 refill.  Called pharmacy to confirm they received.  They will get refill ready for patient to , patient will stop by pharmacy after leaving clinic.    Patient informed.  Mar Aleman RN

## 2021-08-16 ENCOUNTER — VIRTUAL VISIT (OUTPATIENT)
Dept: UROLOGY | Facility: CLINIC | Age: 76
End: 2021-08-16
Payer: COMMERCIAL

## 2021-08-16 VITALS — BODY MASS INDEX: 22.49 KG/M2 | WEIGHT: 135 LBS | HEIGHT: 65 IN

## 2021-08-16 DIAGNOSIS — N40.1 BENIGN PROSTATIC HYPERPLASIA WITH URINARY FREQUENCY: Primary | ICD-10-CM

## 2021-08-16 DIAGNOSIS — R35.0 BENIGN PROSTATIC HYPERPLASIA WITH URINARY FREQUENCY: Primary | ICD-10-CM

## 2021-08-16 DIAGNOSIS — R31.0 GROSS HEMATURIA: ICD-10-CM

## 2021-08-16 PROCEDURE — 99214 OFFICE O/P EST MOD 30 MIN: CPT | Mod: 95 | Performed by: STUDENT IN AN ORGANIZED HEALTH CARE EDUCATION/TRAINING PROGRAM

## 2021-08-16 RX ORDER — TAMSULOSIN HYDROCHLORIDE 0.4 MG/1
0.4 CAPSULE ORAL EVERY EVENING
Qty: 90 CAPSULE | Refills: 3 | Status: SHIPPED | OUTPATIENT
Start: 2021-08-16 | End: 2022-04-12

## 2021-08-16 ASSESSMENT — PAIN SCALES - GENERAL: PAINLEVEL: NO PAIN (0)

## 2021-08-16 ASSESSMENT — MIFFLIN-ST. JEOR: SCORE: 1271.3

## 2021-08-16 NOTE — PROGRESS NOTES
"*PT WILL MEET YOU IN MYCHART*    Bear is a 75 year old who is being evaluated via a billable video visit.      How would you like to obtain your AVS? [employer]  If the video visit is dropped, the invitation should be resent by: Text to cell phone: [phone]  Will anyone else be joining your video visit? No    Video Start Time: 11:23 AM    CHIEF COMPLAINT   Bear Guido who is a 75 year old male returns today for follow-up of gross hematuria, abnormal right kidney on CT scan    HPI   Bear Guido is a 75 year old male who presents with a history of gross hematuria, abnormal right kidney on CT scan.     Is feeling well. Has not noted any further gross hematuria. Denies any right flank pain.    Denies any specific issues with urination and he is not on any prostate medications. In retrospect he has been having some urinary urgency with what feels like less urine coming.    Still sexually active    PHYSICAL EXAM  Patient is a 75 year old  male   Vitals: Height 1.638 m (5' 4.5\"), weight 61.2 kg (135 lb).  Body mass index is 22.81 kg/m .  General Appearance Adult:   Alert, no acute distress, oriented  HENT: throat/mouth:normal, good dentition  Lungs: no respiratory distress, or pursed lip breathing  Heart: No obvious jugular venous distension present  Musculoskeltal: extremities normal, no peripheral edema  Skin: no suspicious lesions or rashes  Neuro: Alert, oriented, speech and mentation normal  Psych: affect and mood normal    All pertinent imaging reviewed:    CT urogram 6/11/2021    IMPRESSION:   1. No radiodense kidney stones, filling defect within the renal  collecting system or hydronephrosis in either kidney.  2. Mild diffuse urinary bladder wall thickening, nonspecific, can be  seen with chronic bladder outflow obstruction or chronic cystitis.  3. Small hiatal hernia and paraesophageal varices.    ASSESSMENT and PLAN  75 year old male who presents with a history of gross hematuria, abnormal right kidney on CT " scan. He is asymptomatic from his right kidney and has not had further gross hematuria. CT shows resolution of the kidney abnormality previously seen. Has some incidental findings of bladder wall thickening, as well as small hiatal hernia and paresophageal varices    He does have some mild urinary symptoms and is not on prostate meds. I discussed starting alpha blocker.    Start tamsulosin 0.4 mg daily  Return in one year   If patient desires he could see a gastroenterologist about the incidental findings of esophageal varices, hiatal hernia; he will check with his PCP about this and think about it      John Fisher MD   [facility]  [facility] Phone: [phone]    Video-Visit Details    Type of service:  Video Visit    Video End Time:11:34 AM    Originating Location (pt. Location): Home    Distant Location (provider location):  [facility] UROLOGY [facility]     Platform used for Video Visit: [employer]

## 2021-08-16 NOTE — LETTER
"8/16/2021       RE: Bear Guido  [address] [address]     Dear Colleague,    Thank you for referring your patient, Bear Guido, to the [facility] UROLOGY CLINIC [place] at [facility]. Please see a copy of my visit note below.    *PT WILL MEET YOU IN MYCHART*    Bear is a 75 year old who is being evaluated via a billable video visit.      How would you like to obtain your AVS? MyChart  If the video visit is dropped, the invitation should be resent by: Text to cell phone: [phone]  Will anyone else be joining your video visit? No    Video Start Time: 11:23 AM    CHIEF COMPLAINT   Bear Guido who is a 75 year old male returns today for follow-up of gross hematuria, abnormal right kidney on CT scan    HPI   Bear Guido is a 75 year old male who presents with a history of gross hematuria, abnormal right kidney on CT scan.     Is feeling well. Has not noted any further gross hematuria. Denies any right flank pain.    Denies any specific issues with urination and he is not on any prostate medications. In retrospect he has been having some urinary urgency with what feels like less urine coming.    Still sexually active    PHYSICAL EXAM  Patient is a 75 year old  male   Vitals: Height 1.638 m (5' 4.5\"), weight 61.2 kg (135 lb).  Body mass index is 22.81 kg/m .  General Appearance Adult:   Alert, no acute distress, oriented  HENT: throat/mouth:normal, good dentition  Lungs: no respiratory distress, or pursed lip breathing  Heart: No obvious jugular venous distension present  Musculoskeltal: extremities normal, no peripheral edema  Skin: no suspicious lesions or rashes  Neuro: Alert, oriented, speech and mentation normal  Psych: affect and mood normal    All pertinent imaging reviewed:    CT urogram 6/11/2021    IMPRESSION:   1. No radiodense kidney stones, filling defect within the renal  collecting system or hydronephrosis in either kidney.  2. Mild " diffuse urinary bladder wall thickening, nonspecific, can be  seen with chronic bladder outflow obstruction or chronic cystitis.  3. Small hiatal hernia and paraesophageal varices.    ASSESSMENT and PLAN  75 year old male who presents with a history of gross hematuria, abnormal right kidney on CT scan. He is asymptomatic from his right kidney and has not had further gross hematuria. CT shows resolution of the kidney abnormality previously seen. Has some incidental findings of bladder wall thickening, as well as small hiatal hernia and paresophageal varices    He does have some mild urinary symptoms and is not on prostate meds. I discussed starting alpha blocker.    Start tamsulosin 0.4 mg daily  Return in one year   If patient desires he could see a gastroenterologist about the incidental findings of esophageal varices, hiatal hernia; he will check with his PCP about this and think about it      John Fisher MD   [facility] Urology  [facility] Phone: [phone]    Video-Visit Details    Type of service:  Video Visit    Video End Time:11:34 AM    Originating Location (pt. Location): Home    Distant Location (provider location):  [facility] UROLOGY [facility]     Platform used for Video Visit: [facility] [employer]

## 2021-11-05 DIAGNOSIS — H90.3 BILATERAL SENSORINEURAL HEARING LOSS: Primary | ICD-10-CM

## 2021-11-05 NOTE — PATIENT INSTRUCTIONS
1. You were seen in the ENT Clinic today by Dr. Thornton.  If you have any questions or concerns after your appointment, please call   - Option 1: ENT Clinic: [phone]   - Option 2: Kaylynn (Dr. Thornton' Nurse): [phone]                    Kaylynn(Dr. Thornton' Nurse): [phone]      2.   Plan to return to clinic in 6 months    3. Ciprodex- right ear- 3 drops twice daily x 5 days    4. Ear biopsy- will call with results    Kaylynn Gonzalez LPN  [facility] - Otolaryngology

## 2021-11-09 ENCOUNTER — OFFICE VISIT (OUTPATIENT)
Dept: FAMILY MEDICINE | Facility: CLINIC | Age: 76
End: 2021-11-09
Payer: COMMERCIAL

## 2021-11-09 VITALS
DIASTOLIC BLOOD PRESSURE: 68 MMHG | HEART RATE: 95 BPM | TEMPERATURE: 97.8 F | WEIGHT: 134.3 LBS | BODY MASS INDEX: 22.93 KG/M2 | OXYGEN SATURATION: 97 % | SYSTOLIC BLOOD PRESSURE: 124 MMHG | HEIGHT: 64 IN

## 2021-11-09 DIAGNOSIS — Z01.818 PREOP GENERAL PHYSICAL EXAM: Primary | ICD-10-CM

## 2021-11-09 DIAGNOSIS — J45.40 MODERATE PERSISTENT ASTHMA WITHOUT COMPLICATION: ICD-10-CM

## 2021-11-09 DIAGNOSIS — H26.9 CATARACT OF BOTH EYES, UNSPECIFIED CATARACT TYPE: ICD-10-CM

## 2021-11-09 PROBLEM — I47.10 SVT (SUPRAVENTRICULAR TACHYCARDIA) (H): Status: RESOLVED | Noted: 2018-12-17 | Resolved: 2021-11-09

## 2021-11-09 PROCEDURE — 99214 OFFICE O/P EST MOD 30 MIN: CPT | Performed by: PHYSICIAN ASSISTANT

## 2021-11-09 ASSESSMENT — ASTHMA QUESTIONNAIRES
QUESTION_1 LAST FOUR WEEKS HOW MUCH OF THE TIME DID YOUR ASTHMA KEEP YOU FROM GETTING AS MUCH DONE AT WORK, SCHOOL OR AT HOME: NONE OF THE TIME
QUESTION_4 LAST FOUR WEEKS HOW OFTEN HAVE YOU USED YOUR RESCUE INHALER OR NEBULIZER MEDICATION (SUCH AS ALBUTEROL): ONCE A WEEK OR LESS
QUESTION_5 LAST FOUR WEEKS HOW WOULD YOU RATE YOUR ASTHMA CONTROL: WELL CONTROLLED
ACUTE_EXACERBATION_TODAY: NO
ACT_TOTALSCORE: 22
QUESTION_2 LAST FOUR WEEKS HOW OFTEN HAVE YOU HAD SHORTNESS OF BREATH: ONCE OR TWICE A WEEK
QUESTION_3 LAST FOUR WEEKS HOW OFTEN DID YOUR ASTHMA SYMPTOMS (WHEEZING, COUGHING, SHORTNESS OF BREATH, CHEST TIGHTNESS OR PAIN) WAKE YOU UP AT NIGHT OR EARLIER THAN USUAL IN THE MORNING: NOT AT ALL

## 2021-11-09 ASSESSMENT — MIFFLIN-ST. JEOR: SCORE: 1262.93

## 2021-11-09 NOTE — PROGRESS NOTES
[facility]  [address]  [facility] [address]  Phone: [phone]  Primary Provider: Russell Alcantara  Pre-op Performing Provider: ANNIE RAMIREZ      PREOPERATIVE EVALUATION:  Today's date: 11/9/2021    Bear Guido is a 76 year old male who presents for a preoperative evaluation.    Surgical Information:  Surgery/Procedure: cataract    Surgery Location: [facility]   Surgeon: Mary   Surgery Date: 11/15/2021   Time of Surgery: TBD   Where patient plans to recover: At home with family  Fax number for surgical facility: [phone]    Type of Anesthesia Anticipated: to be determined    Assessment & Plan     The proposed surgical procedure is considered LOW risk.    Preop general physical exam      Cataract of both eyes, unspecified cataract type      Moderate persistent asthma without complication  Under great control           Risks and Recommendations:  The patient has the following additional risks and recommendations for perioperative complications:   - No identified additional risk factors other than previously addressed    Medication Instructions:  Patient is to take all scheduled medications on the day of surgery    RECOMMENDATION:  APPROVAL GIVEN to proceed with proposed procedure, without further diagnostic evaluation.                      Subjective     HPI related to upcoming procedure: 77 y/o male here for pre op exam.  Symptomatic cataracts proceeding with surgery.  Has been feeling well otherwise.    Patient has had surgery in past and has never had any issues with anaesthesia, bleeding or blood clots.       Preop Questions 11/9/2021   1. Have you ever had a heart attack or stroke? No   2. Have you ever had surgery on your heart or blood vessels, such as a stent placement, a coronary artery bypass, or surgery on an artery in your head, neck, heart, or legs? No   3. Do you have chest pain with activity? No   4. Do you have a history of   heart failure? No   5. Do you currently have a cold, bronchitis or symptoms of other infection? No   6. Do you have a cough, shortness of breath, or wheezing? No   7. Do you or anyone in your family have previous history of blood clots? No   8. Do you or does anyone in your family have a serious bleeding problem such as prolonged bleeding following surgeries or cuts? No   9. Have you ever had problems with anemia or been told to take iron pills? No   10. Have you had any abnormal blood loss such as black, tarry or bloody stools? No   11. Have you ever had a blood transfusion? No   12. Are you willing to have a blood transfusion if it is medically needed before, during, or after your surgery? Yes   13. Have you or any of your relatives ever had problems with anesthesia? No   14. Do you have sleep apnea, excessive snoring or daytime drowsiness? No   15. Do you have any artifical heart valves or other implanted medical devices like a pacemaker, defibrillator, or continuous glucose monitor? No   16. Do you have artificial joints? No   17. Are you allergic to latex? No     Health Care Directive:  Patient does not have a Health Care Directive or Living Will:     Preoperative Review of :   reviewed - controlled substances reflected in medication list.      Status of Chronic Conditions:  ASTHMA - Patient has a longstanding history of moderate-severe Asthma . Patient has been doing well overall noting NO SYMPTOMS and continues on medication regimen consisting of breo, prn albuterol without adverse reactions or side effects.       Review of Systems  CONSTITUTIONAL: NEGATIVE for fever, chills, change in weight  ENT/MOUTH: NEGATIVE for ear, mouth and throat problems  RESP: NEGATIVE for significant cough or SOB  CV: NEGATIVE for chest pain, palpitations or peripheral edema    Patient Active Problem List    Diagnosis Date Noted     Stress fracture of left pubic ramus 07/19/2020     Priority: Medium     SVT (supraventricular  tachycardia) (H) 12/17/2018     Priority: Medium     Tachycardia 01/17/2017     Priority: Medium     Chest pain 01/17/2017     Priority: Medium     Transient insomnia 01/05/2016     Priority: Medium     Patient is followed by Russell Alcantara for ongoing prescription of benzodiazepines.  All refills should be approved by this provider, or covering partner.    Medication(s): Ambien.   Maximum quantity per month: #15 every 3 months  Clinic visit frequency required:  Q 6 months    Controlled substance agreement on file: No    Last [facility] website verification:  done on 5/7/2019   [email or web address]           History of hematuria 10/21/2015     Priority: Medium     Hyperglycemia 10/21/2015     Priority: Medium     Diagnosis updated by automated process. Provider to review and confirm.       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     Priority: Medium     Vitreous degeneration 05/31/2008     Priority: Medium     Allergic rhinitis 04/29/2003     Priority: Medium      Past Medical History:   Diagnosis Date     Deafness in right ear 2002     HL (hearing loss)     Left     Palpitations      Ringing in ears      SVT (supraventricular tachycardia) (H)      Tachycardia      Uncomplicated asthma      Past Surgical History:   Procedure Laterality Date     COLONOSCOPY N/A 10/10/2017    Procedure: COLONOSCOPY;  colonoscopy;  Surgeon: Ervin Irizarry MD;  Location:  GI     ENT SURGERY  [facility]    ear surgery     Current Outpatient Medications   Medication Sig Dispense Refill     albuterol (PROAIR HFA, PROVENTIL HFA, VENTOLIN HFA) 108 (90 BASE) MCG/ACT inhaler Inhale 2 puffs into the lungs every 6 hours as needed for shortness of breath / dyspnea or wheezing 3 Inhaler 1     atorvastatin (LIPITOR) 10 MG tablet Take 1 tablet (10 mg) by mouth daily 90 tablet 2     fluticasone (FLONASE) 50  "MCG/ACT spray Spray 1 spray into both nostrils as needed for rhinitis or allergies       fluticasone-vilanterol (BREO ELLIPTA) 200-25 MCG/INH inhaler Inhale 1 puff into the lungs daily 3 each 1     ibuprofen (ADVIL/MOTRIN) 400 MG tablet Take 1 tablet (400 mg) by mouth every 6 hours as needed for moderate pain       montelukast (SINGULAIR) 10 MG tablet TAKE 1 TABLET BY MOUTH EVERYDAY AT BEDTIME 90 tablet 0     prednisoLONE acetate (PRED FORTE) 1 % ophthalmic suspension Place 6 drops into the right ear 2 times daily (Patient not taking: Reported on 4/12/2021) 10 mL 0     tamsulosin (FLOMAX) 0.4 MG capsule Take 1 capsule (0.4 mg) by mouth every evening 90 capsule 3     VITAMIN D PO Take 1 tablet by mouth daily       zolpidem (AMBIEN) 5 MG tablet Take 1 tablet (5 mg) by mouth nightly as needed for sleep 15 tablet 1       Allergies   Allergen Reactions     Cyclobenzaprine Unknown     Flushing     Seasonal Allergies      Spring and fall allergies        Social History     Tobacco Use     Smoking status: Never Smoker     Smokeless tobacco: Never Used   Substance Use Topics     Alcohol use: Yes     Alcohol/week: 0.0 standard drinks     Comment: 1 - 2 drinks daily in summer, less in winter       History   Drug Use No         Objective     /68   Pulse 95   Temp 97.8  F (36.6  C) (Temporal)   Ht 1.638 m (5' 4.49\")   Wt 60.9 kg (134 lb 4.8 oz)   SpO2 97%   BMI 22.70 kg/m      Physical Exam  GENERAL APPEARANCE: alert and no distress  RESP: lungs clear to auscultation - no rales, rhonchi or wheezes  CV: regular rate and rhythm, normal S1 S2, no S3 or S4 and no murmur, click or rub   NEURO: Normal strength and tone, sensory exam grossly normal, mentation intact and speech normal    Recent Labs   Lab Test 05/28/21  0823 12/07/20  0820 07/19/20  1422   HGB 14.7 15.5 14.7    260 229   INR  --   --  1.08    137 137   POTASSIUM 4.1 4.1 4.3   CR 0.88 0.99 0.90        Diagnostics:  No labs were ordered during " this visit.   No EKG required for low risk surgery (cataract, skin procedure, breast biopsy, etc).    Revised Cardiac Risk Index (RCRI):  The patient has the following serious cardiovascular risks for perioperative complications:   - No serious cardiac risks = 0 points     RCRI Interpretation: 0 points: Class I (very low risk - 0.4% complication rate)           Signed Electronically by: Norm Tenorio PA-C  Copy of this evaluation report is provided to requesting physician.

## 2021-11-09 NOTE — PATIENT INSTRUCTIONS

## 2021-11-10 ASSESSMENT — ASTHMA QUESTIONNAIRES: ACT_TOTALSCORE: 22

## 2021-11-12 ENCOUNTER — OFFICE VISIT (OUTPATIENT)
Dept: OTOLARYNGOLOGY | Facility: CLINIC | Age: 76
End: 2021-11-12
Payer: COMMERCIAL

## 2021-11-12 ENCOUNTER — OFFICE VISIT (OUTPATIENT)
Dept: AUDIOLOGY | Facility: CLINIC | Age: 76
End: 2021-11-12
Payer: COMMERCIAL

## 2021-11-12 VITALS
TEMPERATURE: 98.5 F | WEIGHT: 136 LBS | HEIGHT: 65 IN | OXYGEN SATURATION: 96 % | BODY MASS INDEX: 22.66 KG/M2 | HEART RATE: 88 BPM

## 2021-11-12 DIAGNOSIS — H90.3 BILATERAL SENSORINEURAL HEARING LOSS: ICD-10-CM

## 2021-11-12 DIAGNOSIS — H90.3 ASYMMETRIC SNHL (SENSORINEURAL HEARING LOSS): Primary | ICD-10-CM

## 2021-11-12 DIAGNOSIS — H61.91 LESION OF EXTERNAL EAR CANAL, RIGHT: Primary | ICD-10-CM

## 2021-11-12 PROCEDURE — 88304 TISSUE EXAM BY PATHOLOGIST: CPT | Mod: 26 | Performed by: PATHOLOGY

## 2021-11-12 PROCEDURE — 92557 COMPREHENSIVE HEARING TEST: CPT | Mod: 52 | Performed by: AUDIOLOGIST

## 2021-11-12 PROCEDURE — 88304 TISSUE EXAM BY PATHOLOGIST: CPT | Mod: TC | Performed by: OTOLARYNGOLOGY

## 2021-11-12 PROCEDURE — 99213 OFFICE O/P EST LOW 20 MIN: CPT | Mod: 25 | Performed by: OTOLARYNGOLOGY

## 2021-11-12 PROCEDURE — 92504 EAR MICROSCOPY EXAMINATION: CPT | Mod: 51 | Performed by: OTOLARYNGOLOGY

## 2021-11-12 PROCEDURE — 69105 BIOPSY OF EXTERNAL EAR CANAL: CPT | Mod: RT | Performed by: OTOLARYNGOLOGY

## 2021-11-12 PROCEDURE — 92550 TYMPANOMETRY & REFLEX THRESH: CPT | Mod: 52 | Performed by: AUDIOLOGIST

## 2021-11-12 RX ORDER — CIPROFLOXACIN AND DEXAMETHASONE 3; 1 MG/ML; MG/ML
3 SUSPENSION/ DROPS AURICULAR (OTIC) 2 TIMES DAILY
Qty: 7.5 ML | Refills: 0 | Status: SHIPPED | OUTPATIENT
Start: 2021-11-12 | End: 2021-11-17

## 2021-11-12 ASSESSMENT — MIFFLIN-ST. JEOR: SCORE: 1278.77

## 2021-11-12 ASSESSMENT — PAIN SCALES - GENERAL: PAINLEVEL: NO PAIN (0)

## 2021-11-12 NOTE — PROGRESS NOTES
"  Neurotology Clinic      Name: Bear Guido  MRN: [id number]  Age: 76 year old  : 2020      Chief Complaint:   Follow up     History of Present Illness:   Bear Guido is a 76 year old male with a history of right ear surgeries about 30-35 years ago through [facility] with otosclerosis who presents for follow up. The patient was noted to have a canal plasty and his ossicular plain including the stapes that appeared to still be in place on CT. He was noted to have sudden sensorineural hearing loss accompanied by a vestibular crisis event and infection, which hearing never recovered from this time in . The patient has used a BiCROS system and has enjoyed this. At his first visit with me last year (2020), he reported that his ear was doing well. It just felt like it needs to be clean. Of note: the patient is undergoing cataract extraction with lens implant surgery in 3 days (11/15/21).      Today, his right ear has had some drainage recently and this has been worse than baseline.     Review of Systems:   Pertinent items are noted in HPI or as in patient entered ROS below, remainder of complete ROS is negative.    ENT ROS 2021   Constitutional -   Neurology -   Ears, Nose, Throat Ringing/noise in ears   Cardiopulmonary -   Gastrointestinal/Genitourinary -   Musculoskeletal -   Allergy/Immunology -       Physical Exam:   Pulse 88   Temp 98.5  F (36.9  C)   Ht 1.651 m (5' 5\")   Wt 61.7 kg (136 lb)   SpO2 96%   BMI 22.63 kg/m       Constitutional:  The patient was unaccompanied, well-groomed, and in no acute distress.     Skin: Normal:  warm and pink without rash   Neurologic: Alert and oriented x 3.  CN's III-XII within normal limits.  Voice normal.    Psychiatric: The patient's affect was calm, cooperative, and appropriate.     Communication:  Normal; communicates verbally, normal voice quality.   Respiratory: Breathing comfortably without stridor or exertion of accessory " muscles.    Head/Face:  No lesions or scars.    Eyes: Pupils were equal and reactive.  Extraocular movement intact.     Ears: Pinnae and tragus non-tender.  EAC's and TM's were clear.        Otologic microscope exam:  Right ear: concrescence of greenish wax stuck in the sulcus, which was debrided with suction and straight pick. Floor of medial and inferior canal excavated with white fluffy squamous debride and granulation surrounding.   Left ear: Normal ear    I performed a biopsy of tissue in the right ear with cups forceps.    Audiogram:  AUDIOGRAM: He underwent an audiogram today. This demonstrated:      Right: DNT  Left: Speech reception threshold is 15 dB with 88/96% word recognition. Tympanogram A type     Audiogram was independently reviewed       Assessment and Plan:  Asymmetric hearing loss  Ear canal lesion    Moderate debridement performed today as patient was found to have excavating inferior and medial canal with fluffy squamous and granulated tissue. I then  took multiple biopsies of soft fleshy pink tissue of medial floor of canal and this will be sent to pathology to rule out canacer versus granulation. Informed him that his mychart may receive results before I am able to review them, but I will inform him about his pathology results when I have available. I will start him on antibiotic/steroid drop for the right ear. He should return in 6 months, depending on pathology results.        Scribe Disclosure:  I, Loco Pan, am serving as a scribe to document services personally performed by Sonja Thornton MD at this visit, based upon the provider's statements to me. All documentation has been reviewed by the aforementioned provider prior to being entered into the official medical record.    The documentation recorded by the scribe accurately reflects the services I personally performed and the decisions made by me.    Sonja Thornton MD  Otology & Neurotology  [facility]

## 2021-11-12 NOTE — LETTER
"2021       RE: Bear Guido  [address] [facility] [address]     Dear Colleague,    Thank you for referring your patient, Bear Guido, to the [facility] EAR NOSE AND THROAT CLINIC [place] at [facility]. Please see a copy of my visit note below.      Neurotology Clinic      Name: Bear Guido  MRN: [id number]  Age: 76 year old  : 2020      Chief Complaint:   Follow up     History of Present Illness:   Bear Guido is a 76 year old male with a history of right ear surgeries about 30-[facility] ago [facility] with otosclerosis who presents for follow up. The patient was noted to have a canal plasty and his ossicular plain including the stapes that appeared to still be in place on CT. He was noted to have sudden sensorineural hearing loss accompanied by a vestibular crisis event and infection, which hearing never recovered from this time in . The patient has used a BiCROS system and has enjoyed this. At his first visit with me last year (2020), he reported that his ear was doing well. It just felt like it needs to be clean. Of note: the patient is undergoing cataract extraction with lens implant surgery in 3 days (11/15/21).      Today, his right ear has had some drainage recently and this has been worse than baseline.     Review of Systems:   Pertinent items are noted in HPI or as in patient entered ROS below, remainder of complete ROS is negative.    ENT ROS 2021   Constitutional -   Neurology -   Ears, Nose, Throat Ringing/noise in ears   Cardiopulmonary -   Gastrointestinal/Genitourinary -   Musculoskeletal -   Allergy/Immunology -       Physical Exam:   Pulse 88   Temp 98.5  F (36.9  C)   Ht 1.651 m (5' 5\")   Wt 61.7 kg (136 lb)   SpO2 96%   BMI 22.63 kg/m       Constitutional:  The patient was unaccompanied, well-groomed, and in no acute distress.     Skin: Normal:  warm and pink without rash "   Neurologic: Alert and oriented x 3.  CN's III-XII within normal limits.  Voice normal.    Psychiatric: The patient's affect was calm, cooperative, and appropriate.     Communication:  Normal; communicates verbally, normal voice quality.   Respiratory: Breathing comfortably without stridor or exertion of accessory muscles.    Head/Face:  No lesions or scars.    Eyes: Pupils were equal and reactive.  Extraocular movement intact.     Ears: Pinnae and tragus non-tender.  EAC's and TM's were clear.        Otologic microscope exam:  Right ear: concrescence of greenish wax stuck in the sulcus, which was debrided with suction and straight pick. Floor of medial and inferior canal excavated with white fluffy squamous debride and granulation surrounding.   Left ear: Normal ear    I performed a biopsy of tissue in the right ear with cups forceps.    Audiogram:  AUDIOGRAM: He underwent an audiogram today. This demonstrated:      Right: DNT  Left: Speech reception threshold is 15 dB with 88/96% word recognition. Tympanogram A type     Audiogram was independently reviewed       Assessment and Plan:  Asymmetric hearing loss  Ear canal lesion    Moderate debridement performed today as patient was found to have excavating inferior and medial canal with fluffy squamous and granulated tissue. I then  took multiple biopsies of soft fleshy pink tissue of medial floor of canal and this will be sent to pathology to rule out canacer versus granulation. Informed him that his mychart may receive results before I am able to review them, but I will inform him about his pathology results when I have available. I will start him on antibiotic/steroid drop for the right ear. He should return in 6 months, depending on pathology results.        Scribe Disclosure:  I, Loco Pan, am serving as a scribe to document services personally performed by Sonja Thornton MD at this visit, based upon the provider's statements to me. All documentation has  been reviewed by the aforementioned provider prior to being entered into the official medical record.    The documentation recorded by the scribe accurately reflects the services I personally performed and the decisions made by me.    Sonja Thornton MD  Otology & Neurotology  [facility]

## 2021-11-12 NOTE — NURSING NOTE
"Chief Complaint   Patient presents with     RECHECK     yearly follow up  with audio      Pulse 88, temperature 98.5  F (36.9  C), height 1.651 m (5' 5\"), weight 61.7 kg (136 lb), SpO2 96 %.    Ellis Villa LPN    "

## 2021-11-12 NOTE — PROGRESS NOTES
AUDIOLOGY REPORT    SUMMARY: Audiology visit completed. See audiogram for results.      RECOMMENDATIONS: Follow-up with ENT.      Sammy Chahal.  Licensed Audiologist  [place] #[id number]

## 2021-11-15 LAB
PATH REPORT.COMMENTS IMP SPEC: NORMAL
PATH REPORT.COMMENTS IMP SPEC: NORMAL
PATH REPORT.FINAL DX SPEC: NORMAL
PATH REPORT.GROSS SPEC: NORMAL
PATH REPORT.MICROSCOPIC SPEC OTHER STN: NORMAL
PATH REPORT.RELEVANT HX SPEC: NORMAL
PHOTO IMAGE: NORMAL

## 2022-02-12 ENCOUNTER — HEALTH MAINTENANCE LETTER (OUTPATIENT)
Age: 77
End: 2022-02-12

## 2022-03-31 ENCOUNTER — MYC MEDICAL ADVICE (OUTPATIENT)
Dept: FAMILY MEDICINE | Facility: CLINIC | Age: 77
End: 2022-03-31
Payer: COMMERCIAL

## 2022-03-31 DIAGNOSIS — E78.5 HYPERLIPIDEMIA WITH TARGET LDL LESS THAN 130: Primary | ICD-10-CM

## 2022-03-31 DIAGNOSIS — J45.40 MODERATE PERSISTENT ASTHMA WITHOUT COMPLICATION: ICD-10-CM

## 2022-03-31 DIAGNOSIS — Z12.5 SCREENING PSA (PROSTATE SPECIFIC ANTIGEN): ICD-10-CM

## 2022-03-31 NOTE — TELEPHONE ENCOUNTER
JS,    Patient scheduled to see you 4/12 for his annual wellness exam.  Would like to have labs done before he sees you.    Lipids and BMP orders T'd up.  Not sure what else you would like to check.    Thanks,  Mar Aleman RN

## 2022-03-31 NOTE — TELEPHONE ENCOUNTER
Medication is being filled for 1 time refill only due to:  Patient needs to be seen because due for physical.     Mar Aleman RN

## 2022-04-07 ENCOUNTER — LAB (OUTPATIENT)
Dept: LAB | Facility: CLINIC | Age: 77
End: 2022-04-07
Payer: COMMERCIAL

## 2022-04-07 DIAGNOSIS — E78.5 HYPERLIPIDEMIA WITH TARGET LDL LESS THAN 130: ICD-10-CM

## 2022-04-07 DIAGNOSIS — Z12.5 SCREENING PSA (PROSTATE SPECIFIC ANTIGEN): ICD-10-CM

## 2022-04-07 LAB
ALBUMIN SERPL-MCNC: 3.5 G/DL (ref 3.4–5)
ALP SERPL-CCNC: 60 U/L (ref 40–150)
ALT SERPL W P-5'-P-CCNC: 23 U/L (ref 0–70)
ANION GAP SERPL CALCULATED.3IONS-SCNC: 8 MMOL/L (ref 3–14)
AST SERPL W P-5'-P-CCNC: 19 U/L (ref 0–45)
BASOPHILS # BLD AUTO: 0 10E3/UL (ref 0–0.2)
BASOPHILS NFR BLD AUTO: 0 %
BILIRUB SERPL-MCNC: 0.6 MG/DL (ref 0.2–1.3)
BUN SERPL-MCNC: 13 MG/DL (ref 7–30)
CALCIUM SERPL-MCNC: 9.1 MG/DL (ref 8.5–10.1)
CHLORIDE BLD-SCNC: 105 MMOL/L (ref 94–109)
CHOLEST SERPL-MCNC: 155 MG/DL
CO2 SERPL-SCNC: 26 MMOL/L (ref 20–32)
CREAT SERPL-MCNC: 0.92 MG/DL (ref 0.66–1.25)
EOSINOPHIL # BLD AUTO: 0.6 10E3/UL (ref 0–0.7)
EOSINOPHIL NFR BLD AUTO: 6 %
ERYTHROCYTE [DISTWIDTH] IN BLOOD BY AUTOMATED COUNT: 13.6 % (ref 10–15)
FASTING STATUS PATIENT QL REPORTED: YES
GFR SERPL CREATININE-BSD FRML MDRD: 87 ML/MIN/1.73M2
GLUCOSE BLD-MCNC: 95 MG/DL (ref 70–99)
HCT VFR BLD AUTO: 49.2 % (ref 40–53)
HDLC SERPL-MCNC: 60 MG/DL
HGB BLD-MCNC: 15.7 G/DL (ref 13.3–17.7)
LDLC SERPL CALC-MCNC: 74 MG/DL
LYMPHOCYTES # BLD AUTO: 2.7 10E3/UL (ref 0.8–5.3)
LYMPHOCYTES NFR BLD AUTO: 28 %
MCH RBC QN AUTO: 29.7 PG (ref 26.5–33)
MCHC RBC AUTO-ENTMCNC: 31.9 G/DL (ref 31.5–36.5)
MCV RBC AUTO: 93 FL (ref 78–100)
MONOCYTES # BLD AUTO: 1 10E3/UL (ref 0–1.3)
MONOCYTES NFR BLD AUTO: 10 %
NEUTROPHILS # BLD AUTO: 5.4 10E3/UL (ref 1.6–8.3)
NEUTROPHILS NFR BLD AUTO: 56 %
NONHDLC SERPL-MCNC: 95 MG/DL
PLATELET # BLD AUTO: 258 10E3/UL (ref 150–450)
POTASSIUM BLD-SCNC: 3.9 MMOL/L (ref 3.4–5.3)
PROT SERPL-MCNC: 7 G/DL (ref 6.8–8.8)
PSA SERPL-MCNC: 2.37 UG/L (ref 0–4)
RBC # BLD AUTO: 5.29 10E6/UL (ref 4.4–5.9)
SODIUM SERPL-SCNC: 139 MMOL/L (ref 133–144)
TRIGL SERPL-MCNC: 107 MG/DL
WBC # BLD AUTO: 9.6 10E3/UL (ref 4–11)

## 2022-04-07 PROCEDURE — 80061 LIPID PANEL: CPT

## 2022-04-07 PROCEDURE — 85025 COMPLETE CBC W/AUTO DIFF WBC: CPT

## 2022-04-07 PROCEDURE — G0103 PSA SCREENING: HCPCS

## 2022-04-07 PROCEDURE — 80053 COMPREHEN METABOLIC PANEL: CPT

## 2022-04-07 PROCEDURE — 36415 COLL VENOUS BLD VENIPUNCTURE: CPT

## 2022-04-08 ENCOUNTER — OFFICE VISIT (OUTPATIENT)
Dept: OTHER | Facility: CLINIC | Age: 77
End: 2022-04-08
Payer: COMMERCIAL

## 2022-04-08 VITALS
OXYGEN SATURATION: 99 % | SYSTOLIC BLOOD PRESSURE: 125 MMHG | DIASTOLIC BLOOD PRESSURE: 78 MMHG | BODY MASS INDEX: 22.13 KG/M2 | RESPIRATION RATE: 17 BRPM | TEMPERATURE: 98.1 F | HEART RATE: 80 BPM | WEIGHT: 133 LBS

## 2022-04-08 DIAGNOSIS — Z00.00 ENCOUNTER FOR MEDICARE ANNUAL WELLNESS EXAM: Primary | ICD-10-CM

## 2022-04-08 PROCEDURE — G0439 PPPS, SUBSEQ VISIT: HCPCS | Performed by: FAMILY MEDICINE

## 2022-04-08 ASSESSMENT — ACTIVITIES OF DAILY LIVING (ADL)
CURRENT_FUNCTION: NO ASSISTANCE NEEDED
CURRENT_FUNCTION: NO ASSISTANCE NEEDED

## 2022-04-08 NOTE — PROGRESS NOTES
"Assessment & Plan   No diagnosis found.    Follow Up/Next Steps    No follow-ups on file.  Recommended that patient follow up with PCP to address the following : recommend that patient follow up with pcp to address chronic health conditions. patient due for pneumococcal vaccine.    Counseling and Education  Reviewed preventive health counseling, as reflected in patient instructions        Appropriate preventive services were discussed with this patient, including applicable screening as appropriate for cardiovascular disease, diabetes, osteopenia/osteoporosis, and glaucoma.  As appropriate for age/gender, discussed screening for colorectal cancer, prostate cancer, breast cancer, and cervical cancer. Checklist reviewing preventive services available has been given to the patient.    Reviewed patients plan of care and provided an AVS. The Basic Care Plan (routine screening as documented in Health Maintenance) for Bear meets the Care Plan requirement. This Care Plan has been established and reviewed with the Patient.    Visit Provider: Lesley Nunn RN  Supervising Provider: María Suero MD, MD  [facility]       Subjective   Bear is a 76 year old who is being seen for an Annual Wellness Visit     Healthy Habits:     In general, how would you rate your overall health?  Excellent    Frequency of exercise:  4-5 days/week    Duration of exercise:  30-45 minutes    Do you usually eat at least 4 servings of fruit and vegetables a day, include whole grains    & fiber and avoid regularly eating high fat or \"junk\" foods?  Yes    Taking medications regularly:  Yes    Barriers to taking medications:  None    Medication side effects:  None    Ability to successfully perform activities of daily living:  No assistance needed    Home Safety:  Throw rugs in the hallway    Hearing Impairment:  No hearing concerns    In the past 6 months, have you been bothered by leaking of urine?  " No    In general, how would you rate your overall mental or emotional health?  Excellent      PHQ-2 Total Score: 0    Additional concerns today:  No    Do you feel safe in your environment? Yes    Have you ever done Advance Care Planning? (For example, a Health Directive, POLST, or a discussion with a medical provider or your loved ones about your wishes): No, advance care planning information given to patient to review.  Patient plans to discuss their wishes with loved ones or provider.      Fall risk  Fallen 2 or more times in the past year?: No  Any fall with injury in the past year?: No  Cognitive Screening   1) Repeat 3 items (Leader, Season, Table)    2) Clock draw: NORMAL  3) 3 item recall: Recalls 3 objects  Results: 3 items recalled: COGNITIVE IMPAIRMENT LESS LIKELY    Mini-CogTM Copyright S Helder. Licensed by the author for use in [facility]; reprinted with permission (cruz@[email or web address]). All rights reserved.      Do you have sleep apnea, excessive snoring or daytime drowsiness?: no    Reviewed and updated as needed this visit by clinical staff   Tobacco  Allergies  Meds   Med Hx  Surg Hx  Fam Hx  Soc Hx        Social History     Tobacco Use     Smoking status: Never Smoker     Smokeless tobacco: Never Used   Substance Use Topics     Alcohol use: Yes     Alcohol/week: 0.0 standard drinks     Comment: 1 - 2 drinks daily in summer, less in winter       Current providers sharing in care for this patient include:   Patient Care Team:  Norm Tenorio PA-C as PCP - General (Family Medicine)  João Evans DPM as Assigned Musculoskeletal Provider  Hortensia Shah AuD as Audiologist (Audiology)  Sonja Thornton MD as MD (Otolaryngology)  Sonja Thornton MD as Assigned Surgical Provider  Russell Alcantara MD as Assigned PCP    The following health maintenance items are reviewed in Epic and correct as of today:  Health Maintenance Due   Topic Date Due     ANNUAL  "REVIEW OF  ORDERS  Never done     Pneumococcal Vaccine: 65+ Years (2 of 2 - PPSV23) 10/21/2016     ASTHMA ACTION PLAN  06/26/2018     MEDICARE ANNUAL WELLNESS VISIT  12/10/2021     FALL RISK ASSESSMENT  12/18/2021               Objective    /78 (BP Location: Right arm, Patient Position: Sitting)   Pulse 80   Temp 98.1  F (36.7  C) (Temporal)   Resp 17   Wt 60.3 kg (133 lb)   SpO2 99%   BMI 22.13 kg/m   Estimated body mass index is 22.13 kg/m  as calculated from the following:    Height as of 11/12/21: 1.651 m (5' 5\").    Weight as of this encounter: 60.3 kg (133 lb).  Physical Exam  Patient appears comfortable and in no acute distress.        Identified Health Risks:  "

## 2022-04-08 NOTE — PATIENT INSTRUCTIONS
Patient Education   Personalized Prevention Plan  You are due for the preventive services outlined below.  Your care team is available to assist you in scheduling these services.  If you have already completed any of these items, please share that information with your care team to update in your medical record.  Health Maintenance Due   Topic Date Due     ANNUAL REVIEW OF HM ORDERS  Never done     Pneumococcal Vaccine (2 of 2 - PPSV23) 10/21/2016     Asthma Action Plan - yearly  06/26/2018     FALL RISK ASSESSMENT  12/18/2021

## 2022-04-10 ASSESSMENT — ACTIVITIES OF DAILY LIVING (ADL): CURRENT_FUNCTION: NO ASSISTANCE NEEDED

## 2022-04-10 NOTE — PROGRESS NOTES
"SUBJECTIVE:   Bear Guido is a 76 year old male who presents for Preventive Visit.      Patient has been advised of split billing requirements and indicates understanding: Yes  Are you in the first 12 months of your Medicare coverage?  No    Healthy Habits:     In general, how would you rate your overall health?  Excellent    Frequency of exercise:  4-5 days/week    Duration of exercise:  15-30 minutes    Do you usually eat at least 4 servings of fruit and vegetables a day, include whole grains    & fiber and avoid regularly eating high fat or \"junk\" foods?  Yes    Taking medications regularly:  Yes    Medication side effects:  None    Ability to successfully perform activities of daily living:  No assistance needed    Home Safety:  Throw rugs in the hallway    Hearing Impairment:  No hearing concerns    In the past 6 months, have you been bothered by leaking of urine?  No    In general, how would you rate your overall mental or emotional health?  Excellent      PHQ-2 Total Score: 0    Additional concerns today:  No    Do you feel safe in your environment? Yes    Have you ever done Advance Care Planning? (For example, a Health Directive, POLST, or a discussion with a medical provider or your loved ones about your wishes): Yes, patient states has an Advance Care Planning document and will bring a copy to the clinic.       Fall risk  Fallen 2 or more times in the past year?: No  Any fall with injury in the past year?: No    Cognitive Screening   1) Repeat 3 items (Leader, Season, Table)    2) Clock draw: NORMAL  3) 3 item recall: Recalls 3 objects  Results: 3 items recalled: COGNITIVE IMPAIRMENT LESS LIKELY    Mini-CogTM Copyright [employer]. Licensed by the author for use in [facility]; reprinted with permission ([email or web address]). All rights reserved.          Reviewed and updated as needed this visit by clinical staff                    Reviewed and updated as needed this visit by Provider                 "   Social History     Tobacco Use     Smoking status: Never Smoker     Smokeless tobacco: Never Used   Substance Use Topics     Alcohol use: Yes     Alcohol/week: 0.0 standard drinks     Comment: 1 - 2 drinks daily in summer, less in winter     If you drink alcohol do you typically have >3 drinks per day or >7 drinks per week? No    No flowsheet data found.        Hyperlipidemia Follow-Up      Are you regularly taking any medication or supplement to lower your cholesterol?   Yes- statin    Are you having muscle aches or other side effects that you think could be caused by your cholesterol lowering medication?  No    Asthma Follow-Up    Was ACT completed today?    Yes    ACT Total Scores 4/12/2022   ACT TOTAL SCORE -   ASTHMA ER VISITS -   ASTHMA HOSPITALIZATIONS -   ACT TOTAL SCORE (Goal Greater than or Equal to 20) 25   In the past 12 months, how many times did you visit the emergency room for your asthma without being admitted to the hospital? 0   In the past 12 months, how many times were you hospitalized overnight because of your asthma? 0          How many days per week do you miss taking your asthma controller medication?  0    Please describe any recent triggers for your asthma: pollens    Have you had any Emergency Room Visits, Urgent Care Visits, or Hospital Admissions since your last office visit?  No      Current providers sharing in care for this patient include:   Patient Care Team:  Norm Tenorio PA-C as PCP - General (Family Medicine)  João Evans DPM as Assigned Musculoskeletal Provider  Hortensia Shah AuD as Audiologist (Audiology)  Sonja Thornton MD as MD (Otolaryngology)  Sonja Thornton MD as Assigned Surgical Provider  Russell Alcantara MD as Assigned PCP    The following health maintenance items are reviewed in Epic and correct as of today:  Health Maintenance Due   Topic Date Due     ANNUAL REVIEW OF HM ORDERS  Never done     Pneumococcal Vaccine: 65+  "Years (2 of 2 - PPSV23) 10/21/2016     ASTHMA ACTION PLAN  06/26/2018     BP Readings from Last 3 Encounters:   04/12/22 136/67   04/08/22 125/78   11/09/21 124/68    Wt Readings from Last 3 Encounters:   04/12/22 61.4 kg (135 lb 6.4 oz)   04/08/22 60.3 kg (133 lb)   11/12/21 61.7 kg (136 lb)                          Review of Systems  Constitutional, HEENT, cardiovascular, pulmonary, gi and gu systems are negative, except as otherwise noted.    OBJECTIVE:   /67   Pulse 73   Temp 97.6  F (36.4  C)   Ht 1.651 m (5' 5\")   Wt 61.4 kg (135 lb 6.4 oz)   SpO2 97%   BMI 22.53 kg/m   Estimated body mass index is 22.53 kg/m  as calculated from the following:    Height as of this encounter: 1.651 m (5' 5\").    Weight as of this encounter: 61.4 kg (135 lb 6.4 oz).  Physical Exam  GENERAL: alert and no distress  EYES: Eyes grossly normal to inspection, PERRL and conjunctivae and sclerae normal  NECK: no adenopathy, no asymmetry, masses, or scars and thyroid normal to palpation  RESP: lungs clear to auscultation - no rales, rhonchi or wheezes  CV: regular rate and rhythm, normal S1 S2, no S3 or S4, no murmur, click or rub, no peripheral edema and peripheral pulses strong  MS: no gross musculoskeletal defects noted, no edema  SKIN: no suspicious lesions or rashes  NEURO: Normal strength and tone, mentation intact and speech normal  PSYCH: mentation appears normal, affect normal/bright    Diagnostic Test Results:  Labs reviewed in Epic    ASSESSMENT / PLAN:   (Z00.00) Routine general medical examination at a health care facility  (primary encounter diagnosis)  Comment: doing very well  Plan:     (M25.511,  G89.29) Chronic right shoulder pain  Comment: been a bit ongoing, unsure of injury  Plan: Physical Therapy Referral            (E78.5) Hyperlipidemia with target LDL less than 130  Comment:   Plan: atorvastatin (LIPITOR) 10 MG tablet            (F51.02) Transient insomnia  Comment: very rare/prn use.  Only used 2.5 " "mg at a time.  Plan: zolpidem (AMBIEN) 5 MG tablet            (J45.40) Moderate persistent asthma without complication  Comment:   Plan: fluticasone-vilanterol (BREO ELLIPTA) 200-25         MCG/INH inhaler                  COUNSELING:  Reviewed preventive health counseling, as reflected in patient instructions       Regular exercise       Healthy diet/nutrition    Estimated body mass index is 22.53 kg/m  as calculated from the following:    Height as of this encounter: 1.651 m (5' 5\").    Weight as of this encounter: 61.4 kg (135 lb 6.4 oz).        He reports that he has never smoked. He has never used smokeless tobacco.      Appropriate preventive services were discussed with this patient, including applicable screening as appropriate for cardiovascular disease, diabetes, osteopenia/osteoporosis, and glaucoma.  As appropriate for age/gender, discussed screening for colorectal cancer, prostate cancer, breast cancer, and cervical cancer. Checklist reviewing preventive services available has been given to the patient.    Reviewed patients plan of care and provided an AVS. The Basic Care Plan (routine screening as documented in Health Maintenance) for Bear meets the Care Plan requirement. This Care Plan has been established and reviewed with the Patient.    Counseling Resources:  ATP IV Guidelines  Pooled Cohorts Equation Calculator  Breast Cancer Risk Calculator  Breast Cancer: Medication to Reduce Risk  FRAX Risk Assessment  ICSI Preventive Guidelines  Dietary Guidelines for Americans, 2010  USDA's MyPlate  ASA Prophylaxis  Lung CA Screening    [facility]    Identified Health Risks:  "

## 2022-04-12 ENCOUNTER — OFFICE VISIT (OUTPATIENT)
Dept: FAMILY MEDICINE | Facility: CLINIC | Age: 77
End: 2022-04-12
Payer: COMMERCIAL

## 2022-04-12 VITALS
HEART RATE: 73 BPM | DIASTOLIC BLOOD PRESSURE: 67 MMHG | OXYGEN SATURATION: 97 % | HEIGHT: 65 IN | TEMPERATURE: 97.6 F | BODY MASS INDEX: 22.56 KG/M2 | SYSTOLIC BLOOD PRESSURE: 136 MMHG | WEIGHT: 135.4 LBS

## 2022-04-12 DIAGNOSIS — F51.02 TRANSIENT INSOMNIA: ICD-10-CM

## 2022-04-12 DIAGNOSIS — G89.29 CHRONIC RIGHT SHOULDER PAIN: ICD-10-CM

## 2022-04-12 DIAGNOSIS — Z00.00 ROUTINE GENERAL MEDICAL EXAMINATION AT A HEALTH CARE FACILITY: Primary | ICD-10-CM

## 2022-04-12 DIAGNOSIS — J45.40 MODERATE PERSISTENT ASTHMA WITHOUT COMPLICATION: ICD-10-CM

## 2022-04-12 DIAGNOSIS — E78.5 HYPERLIPIDEMIA WITH TARGET LDL LESS THAN 130: ICD-10-CM

## 2022-04-12 DIAGNOSIS — M25.511 CHRONIC RIGHT SHOULDER PAIN: ICD-10-CM

## 2022-04-12 PROCEDURE — 99397 PER PM REEVAL EST PAT 65+ YR: CPT | Performed by: PHYSICIAN ASSISTANT

## 2022-04-12 RX ORDER — ZOLPIDEM TARTRATE 5 MG/1
5 TABLET ORAL
Qty: 15 TABLET | Refills: 1 | Status: SHIPPED | OUTPATIENT
Start: 2022-04-12 | End: 2023-04-17

## 2022-04-12 RX ORDER — ATORVASTATIN CALCIUM 10 MG/1
10 TABLET, FILM COATED ORAL DAILY
Qty: 90 TABLET | Refills: 0 | Status: SHIPPED | OUTPATIENT
Start: 2022-04-12 | End: 2022-08-10

## 2022-04-12 ASSESSMENT — ASTHMA QUESTIONNAIRES: ACT_TOTALSCORE: 25

## 2022-04-15 NOTE — RESULT ENCOUNTER NOTE
Dear Bear    Your test results are attached, feel free to contact me via [employer]     Everything looks great.  Keep up the good work.    Hal Tenorio PA-C

## 2022-04-30 NOTE — TELEPHONE ENCOUNTER
Rx for all 3 sent 3/4/21.  Mar Aleman RN     Spoke with mother who reports pt had 1 episode of diarrhea, and 1 episode of vomitting today. States pt also was vomitting 2 days ago noticed pt skin on face is flaky as well. Advised for pt to be seen within 24 hours. Verbalized understanding    Reason for Disposition   [1] Age > 1 year old AND [2] MODERATE vomiting (3-7 times/day) with diarrhea AND [3] present > 48 hours    Additional Information   Negative: Shock suspected (very weak, limp, not moving, too weak to stand, pale cool skin)   Negative: Sounds like a life-threatening emergency to the triager   Negative: Severe dehydration suspected (very dizzy when tries to stand or has fainted)   Negative: [1] Blood (red or coffee grounds color) in the vomit AND [2] not from a nosebleed  (Exception: Few streaks AND only occurs once AND age > 1 year)   Negative: Difficult to awaken   Negative: Confused (delirious) when awake   Negative: Poisoning suspected (with a medicine, plant or chemical)   Negative: [1] Age < 12 weeks AND [2] fever 100.4 F (38.0 C) or higher rectally   Negative: [1]  (< 1 month old) AND [2] starts to look or act abnormal in any way (e.g., decrease in activity or feeding)   Negative: [1] Bile (green color) in the vomit AND [2] 2 or more times (Exception: Stomach juice which is yellow)   Negative: [1] Age < 12 months AND [2] bile (green color) in the vomit (Exception: Stomach juice which is yellow)   Negative: [1] SEVERE abdominal pain (when not vomiting) AND [2] present > 1 hour   Negative: Appendicitis suspected (e.g., constant pain > 2 hours, RLQ location, walks bent over holding abdomen, jumping makes pain worse, etc)   Negative: [1] Blood in the diarrhea AND [2] 3 or more times (or large amount)   Negative: [1] Dehydration suspected AND [2] age < 1 year (Signs: no urine > 8 hours AND very dry mouth, no tears, ill appearing, etc.)   Negative: [1] Dehydration suspected AND [2] age > 1 year (Signs: no urine > 12 hours AND  very dry mouth, no tears, ill appearing, etc.)   Negative: [1] Fever AND [2] > 105 F (40.6 C) by any route OR axillary > 104 F (40 C)   Negative: Diabetes suspected (excessive drinking, frequent urination, weight loss, deep or fast breathing, etc.)   Negative: High-risk child (e.g., diabetes mellitus, recent abdominal surgery)   Negative: [1] Fever AND [2] weak immune system (sickle cell disease, HIV, splenectomy, chemotherapy, organ transplant, chronic oral steroids, etc)   Negative: Child sounds very sick or weak to the triager   Negative: [1] Age < 1 year old AND [2] after receiving frequent sips of ORS (or pumped breastmilk for  infants) per guideline AND [3] continues to vomit 3 or more times AND [4] also has frequent watery diarrhea   Negative: [1] SEVERE vomiting (vomiting everything) > 8 hours (> 12 hours for > 7 yo) AND [2] continues after giving frequent sips of ORS (or pumped breastmilk for  infants) using correct technique per guideline   Negative: [1] Continuous abdominal pain or crying AND [2] persists > 2 hours  (Caution: intermittent abdominal pain that comes on with vomiting and then goes away is common)   Negative: [1] Age < 12 weeks AND [2] vomited 3 or more times in last 24 hours (Exception: reflux or spitting up)   Negative: Vomiting an essential medicine   Negative: [1] Taking Zofran AND [2] vomits 3 or more times   Negative: [1] Recent hospitalization AND [2] child not improved or WORSE   Negative: [1] Age < 1 year old AND [2] MODERATE vomiting (3-7 times/day) with diarrhea AND [3] present > 24 hours    Protocols used: VOMITING WITH DIARRHEA-P-AH

## 2022-05-12 NOTE — PROGRESS NOTES
"  Neurotology Clinic      Name: Bear Guido  MRN: [id number]  Age: 76 year old  : 2022      Chief Complaint:   Follow up     History of Present Illness:   Bear Guido is a 76 year old male with a history of right ear surgeries about 30-35 years ago at [facility] for otosclerosis who presents for follow up. The patient last visited with me 6 months ago (2021) with report of enjoying his BiCFrequency system. He underwent cataract extraction and lens implant surgery 3 days after our visit (11/15/2021). I sent a biopsy from his right ear to evaluate granulation tissue vs cancer, and thankfully, this was negative for cancer.    Today, the patient reports that he is doing well. He feels that his hearing is stable. He had one ball of cerumen at the outside of his right ear canal that he was able to remove.     Review of Systems:   Pertinent items are noted in HPI or as in patient entered ROS below, remainder of complete ROS is negative.   [facility] ROS 2022   Constitutional -   Neurology -   Ears, Nose, Throat Ringing/noise in ears, Nasal congestion or drainage   Cardiopulmonary -   Gastrointestinal/Genitourinary -   Musculoskeletal -   Allergy/Immunology -      Physical Exam:   /76   Pulse 74   Temp 98.4  F (36.9  C)   Ht 1.651 m (5' 5\")   Wt 60.8 kg (134 lb)   BMI 22.30 kg/m       Constitutional:  The patient was accompanied by spouse, Bear, well-groomed, and in no acute distress.     Skin: Normal:  warm and pink without rash   Neurologic: Alert and oriented x 3.  CN's III-XII within normal limits.  Voice normal.    Psychiatric: The patient's affect was calm, cooperative, and appropriate.     Communication:  Normal; communicates verbally, normal voice quality.   Respiratory: Breathing comfortably without stridor or exertion of accessory muscles.    Eyes: Pupils were equal and reactive.  Extraocular movement intact.     Ears: Pinnae and tragus non-tender.  EAC's and TM's were " evaluated, results below     Otologic microscope exam:  Right ear: TM has reconstructed appearance. There is squamous debris around the TM and ear canal was debrided with suction, straight pick, and an alligator forceps. Small area of granulation tissue   Left ear: TM and EAC intact     I performed a biopsy of tissue in the right ear with cups forceps.    Pathology:  RIGHT EAR, BIOPSY 11/21/21:  -Minute fragments of squamous mucosa with granulation tissue.  -No evidence of malignancy identified in this biopsy.     Pathology was independently reviewed.    Assessment and Plan:  Mr. Bear Guido is a 76 year old male with a history of right ear surgeries about 30-35 years ago at for otosclerosis with subsequent sensorineural hearing loss who presents for follow up. I am pleased that he feels his hearing in his good ear is stable. We discussed taking another deeper biopsy of the right ear canal, given that the area was raised and friable and continues to have an appearance that may be from postsurgical chronic inflammation, but that can also precipitate malignant transformation. He was amenable to this, and this was performed. We will call him with any results. We'll have him continue following with us in 6 months with my NP colleague, Beena Everett, for routine ear cleaning. I'm going to have him use Ciprodex drops in the right ear for the next 5 days. I will see him once per year with an audiogram, sooner if any concerns arise.      Scribe Disclosure:  I, Valeria Juarez, am serving as a scribe to document services personally performed by Sonja Thornton MD at this visit, based upon the provider's statements to me. All documentation has been reviewed by the aforementioned provider prior to being entered into the official medical record.    The documentation recorded by the scribe accurately reflects the services I personally performed and the decisions made by me.     Sonja Thornton MD  Otology &  Neurotology  [facility]

## 2022-05-13 ENCOUNTER — OFFICE VISIT (OUTPATIENT)
Dept: OTOLARYNGOLOGY | Facility: CLINIC | Age: 77
End: 2022-05-13
Payer: COMMERCIAL

## 2022-05-13 VITALS
HEART RATE: 74 BPM | TEMPERATURE: 98.4 F | BODY MASS INDEX: 22.33 KG/M2 | HEIGHT: 65 IN | DIASTOLIC BLOOD PRESSURE: 76 MMHG | SYSTOLIC BLOOD PRESSURE: 127 MMHG | WEIGHT: 134 LBS

## 2022-05-13 DIAGNOSIS — H90.3 ASYMMETRICAL SENSORINEURAL HEARING LOSS: ICD-10-CM

## 2022-05-13 DIAGNOSIS — H61.91 LESION OF EXTERNAL EAR CANAL, RIGHT: Primary | ICD-10-CM

## 2022-05-13 PROCEDURE — 88305 TISSUE EXAM BY PATHOLOGIST: CPT | Mod: 26 | Performed by: PATHOLOGY

## 2022-05-13 PROCEDURE — 88305 TISSUE EXAM BY PATHOLOGIST: CPT | Mod: TC | Performed by: OTOLARYNGOLOGY

## 2022-05-13 PROCEDURE — 69105 BIOPSY OF EXTERNAL EAR CANAL: CPT | Mod: RT | Performed by: OTOLARYNGOLOGY

## 2022-05-13 PROCEDURE — 99213 OFFICE O/P EST LOW 20 MIN: CPT | Mod: 25 | Performed by: OTOLARYNGOLOGY

## 2022-05-13 RX ORDER — CIPROFLOXACIN AND DEXAMETHASONE 3; 1 MG/ML; MG/ML
5 SUSPENSION/ DROPS AURICULAR (OTIC) AT BEDTIME
Qty: 7.5 ML | Refills: 0 | Status: SHIPPED | OUTPATIENT
Start: 2022-05-13 | End: 2022-05-18

## 2022-05-13 ASSESSMENT — PAIN SCALES - GENERAL: PAINLEVEL: NO PAIN (0)

## 2022-05-13 NOTE — NURSING NOTE
"Chief Complaint   Patient presents with     RECHECK     6 month follow up without audio      Blood pressure 127/76, pulse 74, temperature 98.4  F (36.9  C), height 1.651 m (5' 5\"), weight 60.8 kg (134 lb).    Ellis Villa LPN    "

## 2022-05-13 NOTE — PATIENT INSTRUCTIONS
You were seen in the ENT Clinic today by Dr. Thornton. If you have any questions or concerns after your appointment, please contact us (see below)      2.   Please return to clinic to see Beena Everett NP in 6 months for an ear cleaning. No hearing test needed.      3.   Return to see Dr. Thornton in 1 year with a hearing test         4.   Ciprodex ear drops sent to your pharmacy.        How to Contact Us:  Send a [employer] message to your provider. Our team will respond to you via [employer]. Occasionally, we will need to call you to get further information.  For urgent matters (Monday-Friday), call the ENT Clinic: [phone] and speak with a call center team member - they will route your call appropriately.   If you'd like to speak directly with a nurse, please find our contact information below. We do our best to check voicemail frequently throughout the day, and will work to call you back within 1-2 days. For urgent matters, please use the general clinic phone numbers listed above.      Kaylynn BAZAN RN  ENT RN Care Coordinator  Direct: [phone]    Kaylynn ROBLEDO LPN  Direct: [phone]

## 2022-05-13 NOTE — LETTER
"2022       RE: Bear Guido  [address] [facility] [address]     Dear Colleague,    Thank you for referring your patient, Bear Guido, to the [facility] EAR NOSE AND THROAT [facility]INIC [place] at [facility]. Please see a copy of my visit note below.      Neurotology Clinic      Name: Bear Guido  MRN: [id number]  Age: 76 year old  : 2022      Chief Complaint:   Follow up     History of Present Illness:   Bear Guido is a 76 year old male with a history of right ear surgeries about 30-35 years ago at [facility] for otosclerosis who presents for follow up. The patient last visited with me 6 months ago (2021) with report of enjoying his [employer]. He underwent cataract extraction and lens implant surgery 3 days after our visit (11/15/2021). I sent a biopsy from his right ear to evaluate granulation tissue vs cancer, and thankfully, this was negative for cancer.    Today, the patient reports that he is doing well. He feels that his hearing is stable. He had one ball of cerumen at the outside of his right ear canal that he was able to remove.     Review of Systems:   Pertinent items are noted in HPI or as in patient entered ROS below, remainder of complete ROS is negative.    ENT ROS 2022   Constitutional -   Neurology -   Ears, Nose, Throat Ringing/noise in ears, Nasal congestion or drainage   Cardiopulmonary -   Gastrointestinal/Genitourinary -   Musculoskeletal -   Allergy/Immunology -      Physical Exam:   /76   Pulse 74   Temp 98.4  F (36.9  C)   Ht 1.651 m (5' 5\")   Wt 60.8 kg (134 lb)   BMI 22.30 kg/m       Constitutional:  The patient was accompanied by spouse, Bear, well-groomed, and in no acute distress.     Skin: Normal:  warm and pink without rash   Neurologic: Alert and oriented x 3.  CN's III-XII within normal limits.  Voice normal.    Psychiatric: The patient's affect was calm, cooperative, " and appropriate.     Communication:  Normal; communicates verbally, normal voice quality.   Respiratory: Breathing comfortably without stridor or exertion of accessory muscles.    Eyes: Pupils were equal and reactive.  Extraocular movement intact.     Ears: Pinnae and tragus non-tender.  EAC's and TM's were evaluated, results below     Otologic microscope exam:  Right ear: TM has reconstructed appearance. There is squamous debris around the TM and ear canal was debrided with suction, straight pick, and an alligator forceps. Small area of granulation tissue   Left ear: TM and EAC intact     I performed a biopsy of tissue in the right ear with cups forceps.    Pathology:  RIGHT EAR, BIOPSY 11/21/21:  -Minute fragments of squamous mucosa with granulation tissue.  -No evidence of malignancy identified in this biopsy.     Pathology was independently reviewed.    Assessment and Plan:  Mr. Bear Guido is a 76 year old male with a history of right ear surgeries about 30-35 years ago at for otosclerosis with subsequent sensorineural hearing loss who presents for follow up. I am pleased that he feels his hearing in his good ear is stable. We discussed taking another deeper biopsy of the right ear canal, given that the area was raised and friable and continues to have an appearance that may be from postsurgical chronic inflammation, but that can also precipitate malignant transformation. He was amenable to this, and this was performed. We will call him with any results. We'll have him continue following with us in 6 months with my NP colleague, Beena Everett, for routine ear cleaning. I'm going to have him use Ciprodex drops in the right ear for the next 5 days. I will see him once per year with an audiogram, sooner if any concerns arise.      Scribe Disclosure:  I, Valeria Juarez, am serving as a scribe to document services personally performed by Sonja Thornton MD at this visit, based upon the provider's statements to me.  All documentation has been reviewed by the aforementioned provider prior to being entered into the official medical record.    The documentation recorded by the scribe accurately reflects the services I personally performed and the decisions made by me.     Sonja Thornton MD  Otology & Neurotology  [facility]

## 2022-05-20 ENCOUNTER — TELEPHONE (OUTPATIENT)
Dept: OTOLARYNGOLOGY | Facility: CLINIC | Age: 77
End: 2022-05-20
Payer: COMMERCIAL

## 2022-05-20 NOTE — TELEPHONE ENCOUNTER
"LM for pt regarding biopsy results, \"Results show inflammation and no signs of skin cancer.  Ciprodex (given at visit) would treat the visualized bacteria\". No further treatment or appts necessary, continue with plan to see Beena GORDILLO in 6 months, and Dr. Thornton in 1 year. Encouraged to CB with any questions, CB number provided.    "

## 2022-10-09 ENCOUNTER — HEALTH MAINTENANCE LETTER (OUTPATIENT)
Age: 77
End: 2022-10-09

## 2022-10-31 ENCOUNTER — TRANSFERRED RECORDS (OUTPATIENT)
Dept: HEALTH INFORMATION MANAGEMENT | Facility: CLINIC | Age: 77
End: 2022-10-31

## 2022-11-13 ASSESSMENT — ASTHMA QUESTIONNAIRES
ACT_TOTALSCORE: 23
QUESTION_5 LAST FOUR WEEKS HOW WOULD YOU RATE YOUR ASTHMA CONTROL: WELL CONTROLLED
QUESTION_4 LAST FOUR WEEKS HOW OFTEN HAVE YOU USED YOUR RESCUE INHALER OR NEBULIZER MEDICATION (SUCH AS ALBUTEROL): ONCE A WEEK OR LESS
QUESTION_3 LAST FOUR WEEKS HOW OFTEN DID YOUR ASTHMA SYMPTOMS (WHEEZING, COUGHING, SHORTNESS OF BREATH, CHEST TIGHTNESS OR PAIN) WAKE YOU UP AT NIGHT OR EARLIER THAN USUAL IN THE MORNING: NOT AT ALL
QUESTION_1 LAST FOUR WEEKS HOW MUCH OF THE TIME DID YOUR ASTHMA KEEP YOU FROM GETTING AS MUCH DONE AT WORK, SCHOOL OR AT HOME: NONE OF THE TIME
QUESTION_2 LAST FOUR WEEKS HOW OFTEN HAVE YOU HAD SHORTNESS OF BREATH: NOT AT ALL
ACT_TOTALSCORE: 23

## 2022-11-15 ENCOUNTER — OFFICE VISIT (OUTPATIENT)
Dept: OTOLARYNGOLOGY | Facility: CLINIC | Age: 77
End: 2022-11-15
Payer: COMMERCIAL

## 2022-11-15 ENCOUNTER — VIRTUAL VISIT (OUTPATIENT)
Dept: FAMILY MEDICINE | Facility: CLINIC | Age: 77
End: 2022-11-15
Payer: COMMERCIAL

## 2022-11-15 VITALS
WEIGHT: 133 LBS | BODY MASS INDEX: 22.16 KG/M2 | TEMPERATURE: 97.5 F | OXYGEN SATURATION: 98 % | HEIGHT: 65 IN | HEART RATE: 89 BPM

## 2022-11-15 DIAGNOSIS — H61.91 LESION OF EXTERNAL EAR CANAL, RIGHT: Primary | ICD-10-CM

## 2022-11-15 DIAGNOSIS — R07.9 CHEST PAIN, UNSPECIFIED TYPE: Primary | ICD-10-CM

## 2022-11-15 PROCEDURE — 99213 OFFICE O/P EST LOW 20 MIN: CPT | Mod: 95 | Performed by: PHYSICIAN ASSISTANT

## 2022-11-15 PROCEDURE — 92504 EAR MICROSCOPY EXAMINATION: CPT | Performed by: REGISTERED NURSE

## 2022-11-15 NOTE — PROGRESS NOTES
Bear is a 77 year old who is being evaluated via a billable video visit.      How would you like to obtain your AVS? MyChart  If the video visit is dropped, the invitation should be resent by: Text to cell phone: [phone]  Will anyone else be joining your video visit? No        Assessment & Plan     Chest pain, unspecified type  Pain has since resolved.  Seems low risk for cardiac based on symptoms and work up.  He would like to continue to watch symptoms, and if symptoms persist or worsen, may look at cardiac referral             MED REC REQUIRED  Post Medication Reconciliation Status:       No follow-ups on file.    [facility]   Bear is a 77 year old, presenting for the following health issues:  Hospital F/U      HPI       Hospital Follow-up Visit:    Hospital/Nursing Home/IP Rehab Facility: [facility]  Date of Admission: 11/11/2022  Date of Discharge: 11/11/2022  Reason(s) for Admission: Chest pain    Was your hospitalization related to COVID-19? No   Problems taking medications regularly:  None  Medication changes since discharge: None  Problems adhering to non-medication therapy:  None    Summary of hospitalization:  CareEverywhere information obtained and reviewed  Diagnostic Tests/Treatments reviewed.  Follow up needed: none  Other Healthcare Providers Involved in Patient s Care:         None  Update since discharge: improved.   Plan of care communicated with patient       He was awoken with some sharp left sided chest pain on 11/9, and had some lingering pain the following days.  No longer sharp or stabbing.  Non pleuritic.  Evaluated at ED, benign work up.      He did have normal stress test 1 year ago.  He is very active, and does not get any exertional chest pain.      Review of Systems   Constitutional, HEENT, cardiovascular, pulmonary, gi and gu systems are negative, except as otherwise noted.      Objective           Vitals:  No  vitals were obtained today due to virtual visit.    Physical Exam   GENERAL: Healthy, alert and no distress  EYES: Eyes grossly normal to inspection.  No discharge or erythema, or obvious scleral/conjunctival abnormalities.  RESP: No audible wheeze, cough, or visible cyanosis.  No visible retractions or increased work of breathing.    SKIN: Visible skin clear. No significant rash, abnormal pigmentation or lesions.  NEURO: Cranial nerves grossly intact.  Mentation and speech appropriate for age.  PSYCH: Mentation appears normal, affect normal/bright, judgement and insight intact, normal speech and appearance well-groomed.                Video-Visit Details    Video Start Time: 5:09 PM    Type of service:  Video Visit    Video End Time:5:24 PM    Originating Location (pt. Location): Home    Distant Location (provider location):  On-site    Platform used for Video Visit: José Miguel

## 2022-11-15 NOTE — PROGRESS NOTES
Otolaryngology Clinic  November 15, 2022    HPI:  Bear Guido is here for a recheck of their right ear.  Patient has a history of right ear surgeries for otosclerosis that were completed at an outside facility about 30 to 35 years ago.  Has been following with Dr. Thornton for monitoring of right ear canal granulation tissue versus cancer.  Dr. Thornton has biopsied this tissue multiple times only demonstrating chronic inflammation.  Recommended follow-up exam in 6 months for routine ear cleaning.      Patient presents to clinic today for ear cleaning.  Patient denies any otalgia, otorrhea, dizziness, or change in hearing.    Otologic microscope exam:    Patient's ear pathology required use of the binocular microscope for the purpose of cleaning and improving visualization in order to assure a more accurate diagnostic evaluation.    Right ear was examined under the microscope.  Small amount of squamous debris and crusting on the inferior canal.  This was cleaned with right angle hook and suction.  TM appears intact.    Left ear was also examined under the microscope.  Ear canal is clean and dry.  Once cleaned, TM visualized under microscope. Normal appearing TM, nicely aerated middle ear space.     Assessment and Plan:  1. Lesion of external ear canal, right  Patient with history of granulation tissue of right ear canal.  Returns to clinic today for routine ear cleaning.  Right ear appears stable with a chronic area of granulation tissue without exposed bone, infection, or bleeding.  Recommend patient continue to follow-up in clinic for scheduled ear cleanings.  He is scheduled with Dr. Thornton in May 2023 with audiogram prior to appointment.     Beena Everett DNP, APRN, CNP  Otolaryngology  Head & Neck Surgery  [phone]    20 minutes spent on the date of the encounter doing chart review, history and exam, documentation and further activities per the note excluding time examining and cleaning ear under microscopy.

## 2022-11-15 NOTE — NURSING NOTE
"Chief Complaint   Patient presents with     RECHECK     6 month follow up with no audio .     Pulse 89, temperature 97.5  F (36.4  C), height 1.651 m (5' 5\"), weight 60.3 kg (133 lb), SpO2 98 %.    Ellis Villa LPN    "

## 2022-11-15 NOTE — LETTER
11/15/2022       RE: Bear Guido  [address] [facility] [address]     Dear Colleague,    Thank you for referring your patient, Bear Guido, to the [facility] NOSE AND THROAT CLINIC [place] at [facility]. Please see a copy of my visit note below.      Otolaryngology Clinic  November 15, 2022    HPI:  Bear Guido is here for a recheck of their right ear.  Patient has a history of right ear surgeries for otosclerosis that were completed at an outside facility about 30 to 35 years ago.  Has been following with Dr. Thornton for monitoring of right ear canal granulation tissue versus cancer.  Dr. Thornton has biopsied this tissue multiple times only demonstrating chronic inflammation.  Recommended follow-up exam in 6 months for routine ear cleaning.      Patient presents to clinic today for ear cleaning.  Patient denies any otalgia, otorrhea, dizziness, or change in hearing.    Otologic microscope exam:    Patient's ear pathology required use of the binocular microscope for the purpose of cleaning and improving visualization in order to assure a more accurate diagnostic evaluation.    Right ear was examined under the microscope.  Small amount of squamous debris and crusting on the inferior canal.  This was cleaned with right angle hook and suction.  TM appears intact.    Left ear was also examined under the microscope.  Ear canal is clean and dry.  Once cleaned, TM visualized under microscope. Normal appearing TM, nicely aerated middle ear space.     Assessment and Plan:  1. Lesion of external ear canal, right  Patient with history of granulation tissue of right ear canal.  Returns to clinic today for routine ear cleaning.  Right ear appears stable with a chronic area of granulation tissue without exposed bone, infection, or bleeding.  Recommend patient continue to follow-up in clinic for scheduled ear cleanings.  He is scheduled with Dr. Thornton in May 2023  with audiogram prior to appointment.     Beena Everett DNP, APRN, CNP  Otolaryngology  Head & Neck Surgery  [phone]    20 minutes spent on the date of the encounter doing chart review, history and exam, documentation and further activities per the note excluding time examining and cleaning ear under microscopy.      Again, thank you for allowing me to participate in the care of your patient.      Sincerely,    Rachel Everett, NP

## 2022-11-23 ENCOUNTER — VIRTUAL VISIT (OUTPATIENT)
Dept: FAMILY MEDICINE | Facility: CLINIC | Age: 77
End: 2022-11-23
Payer: COMMERCIAL

## 2022-11-23 DIAGNOSIS — K44.9 HIATAL HERNIA: ICD-10-CM

## 2022-11-23 DIAGNOSIS — K42.9 UMBILICAL HERNIA WITHOUT OBSTRUCTION AND WITHOUT GANGRENE: Primary | ICD-10-CM

## 2022-11-23 PROCEDURE — 99214 OFFICE O/P EST MOD 30 MIN: CPT | Mod: 95 | Performed by: FAMILY MEDICINE

## 2022-11-23 NOTE — PROGRESS NOTES
Bear is a 77 year old who is being evaluated via a billable video visit.     How would you like to obtain your AVS? MyChart  If the video visit is dropped, the invitation should be resent by: Text to cell phone: [phone]  Will anyone else be joining your video visit? No    Assessment & Plan   1. Umbilical hernia without obstruction and without gangrene: Obvious on video visit exam.  Likely fat-containing.  Recommend referral to general surgery for repair.  Referral placed peer discussed red flag symptoms when to go in early to emergency department in case of incarceration etc.  - Adult General Surg Referral; Future    2. Hiatal hernia: Noticed on CT urogram from last year.  May be able to treat both this and umbilical hernia at the same time.  - Adult General Surg Referral; Future      Chevy Batista MD  [facility]    This chart is completed utilizing dictation software; typos and/or incorrect word substitutions may unintentionally occur.     Subjective   Bear is a 77 year old, presenting for the following health issues:    History of Present Illness       Reason for visit:  Umbilical hernia pain  Symptom onset:  1-3 days ago  Symptoms include:  Pain when touched, generalized pain or feeling of discomfort or fullness in stomach  Symptom intensity:  Moderate  Symptom progression:  Staying the same  Had these symptoms before:  No  What makes it worse:  No  What makes it better:  No    He eats 4 or more servings of fruits and vegetables daily.He consumes 0 sweetened beverage(s) daily.He exercises with enough effort to increase his heart rate 20 to 29 minutes per day.  He exercises with enough effort to increase his heart rate 4 days per week.   He is taking medications regularly.     Believes he has had an umbilical hernia for at least a year. Woke up Sunday and was painful. Hard to get this reduced now. Before was easily reducible.     Still having daily bowel movements, no  nausea, vomiting, hematochezia, melena.     Not painful without pushing on this. At rest is sore 4/10 at most. Sharp pain if he presses on this. Constant currently.    Review of Systems   Constitutional, HEENT, cardiovascular, pulmonary, GI, , musculoskeletal, neuro, skin, endocrine and psych systems are negative, except as otherwise noted.      Objective       Vitals:  No vitals were obtained today due to virtual visit.    Physical Exam   GENERAL: Healthy, alert and no distress  EYES: Eyes grossly normal to inspection.  No discharge or erythema, or obvious scleral/conjunctival abnormalities.  RESP: No audible wheeze, cough, or visible cyanosis.  No visible retractions or increased work of breathing.    SKIN: Obvious small umbilical hernia present. Visible skin clear. No significant rash, abnormal pigmentation or lesions.  NEURO: Cranial nerves grossly intact.  Mentation and speech appropriate for age.  PSYCH: Mentation appears normal, affect normal/bright, judgement and insight intact, normal speech and appearance well-groomed.    Labs: none        Video-Visit Details    Video Start Time: 1:36 PM    Type of service:  Video Visit    Video End Time:1:51 PM    Originating Location (pt. Location): Home    Distant Location (provider location):  On-site    Platform used for Video Visit: José Miguel

## 2022-12-08 ENCOUNTER — TELEPHONE (OUTPATIENT)
Dept: SURGERY | Facility: CLINIC | Age: 77
End: 2022-12-08

## 2022-12-08 ENCOUNTER — OFFICE VISIT (OUTPATIENT)
Dept: SURGERY | Facility: CLINIC | Age: 77
End: 2022-12-08
Attending: FAMILY MEDICINE
Payer: COMMERCIAL

## 2022-12-08 VITALS
HEART RATE: 90 BPM | WEIGHT: 135 LBS | BODY MASS INDEX: 22.47 KG/M2 | DIASTOLIC BLOOD PRESSURE: 80 MMHG | SYSTOLIC BLOOD PRESSURE: 130 MMHG

## 2022-12-08 DIAGNOSIS — K44.9 HIATAL HERNIA: ICD-10-CM

## 2022-12-08 DIAGNOSIS — K42.9 UMBILICAL HERNIA WITHOUT OBSTRUCTION AND WITHOUT GANGRENE: ICD-10-CM

## 2022-12-08 PROCEDURE — 99203 OFFICE O/P NEW LOW 30 MIN: CPT | Performed by: SURGERY

## 2022-12-08 NOTE — TELEPHONE ENCOUNTER
Orders received forhernia repair with Dr. Guru Vasquez.       Left message for patient to call me at his convenience to schedule surgery.     Harini ORTIZ    Surgery Coordinator  [facility]  [facility]  [phone]

## 2022-12-09 ENCOUNTER — TELEPHONE (OUTPATIENT)
Dept: FAMILY MEDICINE | Facility: CLINIC | Age: 77
End: 2022-12-09

## 2022-12-09 DIAGNOSIS — M25.511 ACUTE PAIN OF RIGHT SHOULDER: Primary | ICD-10-CM

## 2022-12-09 NOTE — TELEPHONE ENCOUNTER
JOLEEN,  2 month R shoulder/joint pain   Unsure of cause  Worsening  Unable to do push ups   Generally does 25 daily, at baseline  Requesting PT referral  Pended if you approve  Shruthi FALK RN

## 2022-12-12 NOTE — TELEPHONE ENCOUNTER
Patient called back.  Informed him PT Referral signed.  Prefers to go to [facility] - recommended Chas to patient.  He can let  know  Mar Aleman RN

## 2022-12-15 NOTE — PROGRESS NOTES
[facility]  Surgery Consultation    CONSULTATION REQUESTED BY:  Chevy Batista MD  Primary care provider Norm Tenorio [phone]    HPI: Patient is a 77-year-old gentleman referred by the above provider for consultation regarding umbilical hernia he states that this is been present for over a year.  Its been reducible.  That being said 2 weeks ago he was in a significant mount of discomfort and pain associated with this.  This is since improved.  He has had no GI or bowel obstruction symptoms.  No signs or symptoms that suggest incarceration or strangulation.    PMH:   has a past medical history of Deafness in right ear (2002), HL (hearing loss), Palpitations, Ringing in ears, SVT (supraventricular tachycardia) (H), Tachycardia, and Uncomplicated asthma.  PSH:    has a past surgical history that includes ENT surgery (1979) and Colonoscopy (N/A, 10/10/2017).  Social History:   reports that he has never smoked. He has never used smokeless tobacco. He reports current alcohol use. He reports that he does not use drugs.  Family History:  family history includes Allergies in his mother; Asthma in his father; Cancer (age of onset: 62) in his father; Cerebrovascular Disease in his maternal grandfather; Coronary Artery Disease in his paternal grandfather and paternal grandmother; Heart Disease in his maternal grandfather, paternal grandfather, and paternal grandmother; High cholesterol in his father and mother; Hyperlipidemia in his father and mother.  Medications/Allergies: Home medications and allergies reviewed.    ROS:  The 10 point Review of Systems is negative other than noted in the HPI.    Physical Exam:  /80   Pulse 90   Wt 61.2 kg (135 lb)   BMI 22.47 kg/m    GENERAL: Generally appears well.  Psych: Alert and Oriented.  Normal affect  Eyes: Sclera clear  Respiratory:  Lungs clear to ausculation bilaterally with good air excursion  Cardiovascular:  Regular Rate and  Rhythm with no murmurs gallops or rubs, normal peripheral pulses  GI: Abdomen Non Distended Soft Non-Tender  Umbilical hernia palpated..  Groin- I examined the patient in both the standing and supine positions. Right Groin- No hernia Palpated. Left Groin- No hernia Palpated. No scrotal or testicle abnormalities.  Lymphatic/Hematologic/Immune:  No femoral or cervical lymphadenopathy.  Integumentary:  No rashes  Neurological: grossly intact     All new lab and imaging data was reviewed.     Impression and Plan:  Patient is a 77 year old male with umbilical hernia    PLAN: Recommend outpatient open repair at his convenience  I discussed the pathophysiology of hernias and options for repair including laparoscopic VS open.  The risks associated with the procedure including, but not limited to, recurrence, nerve entrapment or injury, persistence of pain, injury to the bowel/bladder, infertility, hematoma, mesh migration, mesh infection, MI, and PE were discussed with the patient. He indicated understanding of the discussion, asked appropriate questions, and provided consent. Signs and symptoms of incarceration were discussed. If these develop in the interim, he promises to call or go straight to the ER. I have provided the patient with an information pamphlet.      Thank you very much for this consult.    Guru Vasquez M.D.  [facility]  [phone]    Please route or send letter to:  Primary Care Provider (PCP) and Referring Provider

## 2022-12-19 ENCOUNTER — HOSPITAL ENCOUNTER (OUTPATIENT)
Facility: CLINIC | Age: 77
End: 2022-12-19
Attending: SURGERY | Admitting: SURGERY
Payer: COMMERCIAL

## 2022-12-20 ENCOUNTER — TELEPHONE (OUTPATIENT)
Dept: SURGERY | Facility: CLINIC | Age: 77
End: 2022-12-20

## 2022-12-20 RX ORDER — CEFAZOLIN SODIUM/WATER 2 G/20 ML
2 SYRINGE (ML) INTRAVENOUS
Status: CANCELLED | OUTPATIENT
Start: 2022-12-20

## 2022-12-20 RX ORDER — CEFAZOLIN SODIUM/WATER 2 G/20 ML
2 SYRINGE (ML) INTRAVENOUS SEE ADMIN INSTRUCTIONS
Status: CANCELLED | OUTPATIENT
Start: 2022-12-20

## 2022-12-20 NOTE — TELEPHONE ENCOUNTER
Type of surgery: Open umbilical hernia repair  Location of surgery: [facility]  Date and time of surgery: 2/3/23 at 9am  Surgeon: Dr. Guru Vasquez  Pre-Op Appt Date: patient to schedule  Post-Op Appt Date: patient to schedule   Packet sent out: Yes  Pre-cert/Authorization completed:  Not Applicable  Date: 12/20/22

## 2023-01-03 ENCOUNTER — THERAPY VISIT (OUTPATIENT)
Dept: PHYSICAL THERAPY | Facility: CLINIC | Age: 78
End: 2023-01-03
Attending: PHYSICIAN ASSISTANT
Payer: COMMERCIAL

## 2023-01-03 DIAGNOSIS — M25.511 ACUTE PAIN OF RIGHT SHOULDER: ICD-10-CM

## 2023-01-03 PROCEDURE — 97161 PT EVAL LOW COMPLEX 20 MIN: CPT | Mod: GP | Performed by: PHYSICAL THERAPIST

## 2023-01-03 PROCEDURE — 97110 THERAPEUTIC EXERCISES: CPT | Mod: GP | Performed by: PHYSICAL THERAPIST

## 2023-01-03 NOTE — PROGRESS NOTES
[facility] Initial Evaluation     Present: The history is provided by the patient. No  was used.    Referring doctor: Norm Tenorio PA-C  Referring diagnosis: acute right shoulder pain  Date of referral: 12/10/22      Subjective:  Bear Guido is a 77 year old male with complaints of right shoulder pain. Pt reports that his shoulder has been hurting for the past couple of months and he kept thinking it would get better but it has persisted. He does not recall any known mechanism of injury but says he has been busy the past 10 days moving his sister-in-law into assisted living and getting her house ready to sell.     Symptoms commenced as a result of: insidious onset. Location of symptoms: R shoulder, general soreness throughout, occasionally more anteriorly. Pain level on number scale: 3/10. Quality of pain: ache or soreness all the time . Associated symptoms: denies numbness/tingling in hands/arm. Pain frequency (constant/intermittent): constant ache, intermittent sharp with movement. Symptoms are exacerbated by: reaching, raising. Symptoms are relieved by: rest, Advil. Progression of symptoms since onset: worsening. Imaging: see Epic. Previous treatment/response: PT previously for low back.      General health as reported by patient is excellent. Pertinent medical history includes: See Epic. Medical allergies: see Epic. Other pertinent surgeries: see Epic. Current medications: See Epic.     Occupation: retired, , . Primary job tasks: computer work, driving, pushing/pulling. Barriers at home/work: None reported by patient.     Social history: likes to garden and bike; travels in winter; helping wife's sister with moving; has morning exercise routine - does sit-ups and pushups, had been doing 20-25 a day but unable to since it has been hurting    Red flags:  None reported by patient.    Objective  Observation:  Posture: WFL, slightly  rounded shoulders bilaterally    Scapular positioning: WFL, slightly protracted    *=painful    Shoulder AROM (* = pain) Right Left   *        160    160   ER/HBH T1* T4   IR/HBB L3 T8     Shoulder PROM (* = pain) Right Left    160   ABD NT NT   ER 90 90   IR 70 70     Shoulder Strength (* = pain) Right Left   FL 4/5 5/5   ABD NT NT   ER (0 abduction) 5/5 5/5   IR 5/5 5/5     Special Tests Right Left   Jeanette - -   Empty can + -   Painful Arc + -   ER lag - -     Palpation tenderness: tenderness with palpation of AC joint, proximal biceps tendon      Key Findings        Assessment/Plan:    Patient is a 77 year old male with right side shoulder complaints.    Patient has the following significant findings with corresponding treatment plan.                Diagnosis 1:  Right shoulder  Pain -  self management, education and home program  Decreased ROM/flexibility - manual therapy, therapeutic exercise, therapeutic activity and home program    Therapy Evaluation Codes:     Cumulative Therapy Evaluation is: Low complexity.    Previous and current functional limitations:  (See Goal Flow Sheet for this information)    Short term and Long term goals: (See Goal Flow Sheet for this information)     Communication ability:  Patient appears to be able to clearly communicate and understand verbal and written communication and follow directions correctly.  Treatment Explanation - The following has been discussed with the patient:   RX ordered/plan of care  Anticipated outcomes  Possible risks and side effects  This patient would benefit from PT intervention to resume normal activities.   Rehab potential is good.    Frequency:  1 X week, once daily; decreasing to every other week as symptoms improve and patient becomes more independent with HEP  Duration:  for 12 weeks  Discharge Plan:  Achieve all LTG.  Independent in home treatment program.  Reach maximal therapeutic benefit.    Please refer to the  daily flowsheet for treatment today, total treatment time and time spent performing 1:1 timed codes.     Please Contact me with any questions or concerns. Thank you for your time and entrusting me with your care.     Kareem Madrid, PT, DPT, OCS, CSMT  Physical Therapist  [facility]  [phone]

## 2023-01-03 NOTE — PROGRESS NOTES
[facility]    OUTPATIENT Physical Therapy ORTHOPEDIC EVALUATION  PLAN OF TREATMENT FOR OUTPATIENT REHABILITATION  (COMPLETE FOR INITIAL CLAIMS ONLY)  Patient's Last Name, First Name, M.I.  YOB: 1945  Bear Guido    Provider s Name:  [facility]   Medical Record No.  [id number]   Start of Care Date:  01/03/23   Onset Date:   12/10/22 (PT referral date)   Treatment Diagnosis:  right shoulder pain Medical Diagnosis:  Acute pain of right shoulder       Goals:     01/03/23 0500   Body Part   Goals listed below are for R shoulder   Goal #1   Goal #1 reaching   Previous Functional Level No restrictions;Could reach;overhead;behind back   Performance level without pain   Current Functional Level Can reach ;overhead   Performance level 3/10 pain in right shoulder   STG Target Performance Reach ;overhead   Performance level <2/10 pain in right shoulder   Rationale for independent personal hygiene;for dressing;for bathing   Due date 01/31/23   LTG Target Performance Reach;overhead   Performance Level without pain in right shoulder   Rationale for dressing;for bathing;for independent personal hygiene   Due date 03/28/23         Therapy Frequency:  1x/week initially, decreasing to 2x/month  Predicted Duration of Therapy Intervention:  12 weeks    Kareem Madrid, PT                 I CERTIFY THE NEED FOR THESE SERVICES FURNISHED UNDER        THIS PLAN OF TREATMENT AND WHILE UNDER MY CARE     (Physician attestation of this document indicates review and certification of the therapy plan).                     Certification Date From:  01/03/23   Certification Date To:  03/28/23    Referring Provider:  Norm Tenorio    Initial Assessment        See Epic Evaluation SOC Date: 01/03/23

## 2023-04-17 ENCOUNTER — OFFICE VISIT (OUTPATIENT)
Dept: FAMILY MEDICINE | Facility: CLINIC | Age: 78
End: 2023-04-17
Payer: COMMERCIAL

## 2023-04-17 VITALS
BODY MASS INDEX: 22.59 KG/M2 | HEIGHT: 65 IN | OXYGEN SATURATION: 95 % | DIASTOLIC BLOOD PRESSURE: 73 MMHG | WEIGHT: 135.6 LBS | RESPIRATION RATE: 16 BRPM | SYSTOLIC BLOOD PRESSURE: 119 MMHG | HEART RATE: 62 BPM | TEMPERATURE: 97.3 F

## 2023-04-17 DIAGNOSIS — F51.02 TRANSIENT INSOMNIA: ICD-10-CM

## 2023-04-17 DIAGNOSIS — M54.50 ACUTE BILATERAL LOW BACK PAIN WITHOUT SCIATICA: Primary | ICD-10-CM

## 2023-04-17 DIAGNOSIS — Z23 NEED FOR VACCINATION: ICD-10-CM

## 2023-04-17 PROCEDURE — 99213 OFFICE O/P EST LOW 20 MIN: CPT | Mod: 25 | Performed by: PHYSICIAN ASSISTANT

## 2023-04-17 PROCEDURE — 90677 PCV20 VACCINE IM: CPT | Performed by: PHYSICIAN ASSISTANT

## 2023-04-17 PROCEDURE — G0009 ADMIN PNEUMOCOCCAL VACCINE: HCPCS | Performed by: PHYSICIAN ASSISTANT

## 2023-04-17 RX ORDER — ZOLPIDEM TARTRATE 5 MG/1
5 TABLET ORAL
Qty: 15 TABLET | Refills: 1 | Status: SHIPPED | OUTPATIENT
Start: 2023-04-17 | End: 2024-02-05

## 2023-04-17 RX ORDER — ZOLPIDEM TARTRATE 5 MG/1
5 TABLET ORAL
Qty: 15 TABLET | Refills: 1 | Status: CANCELLED | OUTPATIENT
Start: 2023-04-17

## 2023-04-17 ASSESSMENT — ENCOUNTER SYMPTOMS: BACK PAIN: 1

## 2023-04-17 ASSESSMENT — PAIN SCALES - GENERAL: PAINLEVEL: SEVERE PAIN (6)

## 2023-04-17 ASSESSMENT — ASTHMA QUESTIONNAIRES: ACT_TOTALSCORE: 25

## 2023-04-17 NOTE — PROGRESS NOTES
Assessment & Plan     Acute bilateral low back pain without sciatica  History does seem to suggest OA, discussed some sleep position changes, possible short course naproxen at dinner    Transient insomnia  Stable, responsible use  - zolpidem (AMBIEN) 5 MG tablet; Take 1 tablet (5 mg) by mouth nightly as needed for sleep    Need for vaccination    - Pneumococcal 20 Valent Conjugate (PCV20)            [facility] Rowley [facility]    Sascha Sifuentes is a 77 year old, presenting for the following health issues:  Back Pain        4/17/2023     7:05 AM   Additional Questions   Roomed by Melania RAJAN     Back Pain   This is a new problem. The current episode started more than 1 week ago. The problem occurs constantly.   History of Present Illness       Back Pain:  He presents for follow up of back pain. Patient's back pain is a new problem.    Original cause of back pain: not sure  First noticed back pain: more than 1 month ago  Patient feels back pain: dailyLocation of back pain:  Right lower back and left lower back  Description of back pain: burning and sharp  Back pain spreads: nowhere    Since patient first noticed back pain, pain is: always present, but gets better and worse  Does back pain interfere with his job:  No  On a scale of 1-10 (10 being the worst), patient describes pain as:  5  What makes back pain worse: lying down  Acupuncture: not tried  Acetaminophen: helpful  Activity or exercise: not helpful  Chiropractor:  Not tried  Cold: not tried  Heat: not tried  Massage: helpful  Muscle relaxants: not tried  NSAIDS: helpful  Opioids: not tried  Physical Therapy: not tried  Rest: not helpful  Steroid Injection: not tried  Stretching: helpful  Surgery: not tried  TENS unit: not tried  Topical pain relievers: not tried  Other healthcare providers patient is seeing for back pain: None    He eats 2-3 servings of fruits and vegetables daily.He consumes 1 sweetened beverage(s) daily.He  "exercises with enough effort to increase his heart rate 10 to 19 minutes per day.  He exercises with enough effort to increase his heart rate 5 days per week. He is missing 1 dose(s) of medications per week.         Does use very prn ambein to help with sleep.  Very responsible use, due for refill      Review of Systems   Musculoskeletal: Positive for back pain.      Constitutional, HEENT, cardiovascular, pulmonary, gi and gu systems are negative, except as otherwise noted.      Objective    /73 (BP Location: Left arm, Patient Position: Sitting, Cuff Size: Adult Regular)   Pulse 62   Temp 97.3  F (36.3  C) (Temporal)   Resp 16   Ht 1.651 m (5' 5\")   Wt 61.5 kg (135 lb 9.6 oz)   SpO2 95%   BMI 22.57 kg/m    Body mass index is 22.57 kg/m .  Physical Exam   GENERAL: alert and no distress  EYES: Eyes grossly normal to inspection  RESP: lungs clear to auscultation - no rales, rhonchi or wheezes  CV: regular rate and rhythm, normal S1 S2, no S3 or S4, no murmur, click or rub, no peripheral edema and peripheral pulses strong  MS: no gross musculoskeletal defects noted, no edema  PSYCH: mentation appears normal, affect normal/bright                    "

## 2023-04-17 NOTE — NURSING NOTE
Prior to immunization administration, verified patients identity using patient s name and date of birth. Please see Immunization Activity for additional information.     Screening Questionnaire for Adult Immunization    Are you sick today?   No   Do you have allergies to medications, food, a vaccine component or latex?   No   Have you ever had a serious reaction after receiving a vaccination?   No   Do you have a long-term health problem with heart, lung, kidney, or metabolic disease (e.g., diabetes), asthma, a blood disorder, no spleen, complement component deficiency, a cochlear implant, or a spinal fluid leak?  Are you on long-term aspirin therapy?   No   Do you have cancer, leukemia, HIV/AIDS, or any other immune system problem?   No   Do you have a parent, brother, or sister with an immune system problem?   No   In the past 3 months, have you taken medications that affect  your immune system, such as prednisone, other steroids, or anticancer drugs; drugs for the treatment of rheumatoid arthritis, Crohn s disease, or psoriasis; or have you had radiation treatments?   No   Have you had a seizure, or a brain or other nervous system problem?   No   During the past year, have you received a transfusion of blood or blood    products, or been given immune (gamma) globulin or antiviral drug?   No   For women: Are you pregnant or is there a chance you could become       pregnant during the next month?   No   Have you received any vaccinations in the past 4 weeks?   No     Immunization questionnaire answers were all negative.      Injection of pcv 20 given by Melania Mercedes. Patient instructed to remain in clinic for 15 minutes afterwards, and to report any adverse reactions.     Screening performed by Melania Mercedes on 4/17/2023 at 7:29 AM.

## 2023-04-20 ENCOUNTER — PATIENT OUTREACH (OUTPATIENT)
Dept: CARE COORDINATION | Facility: CLINIC | Age: 78
End: 2023-04-20
Payer: COMMERCIAL

## 2023-05-11 NOTE — PROGRESS NOTES
"  Neurotology Clinic      Name: Bear Guiod  MRN: [id number]  Age: 77 year old  : 2023      Chief Complaint:   Follow up     History of Present Illness:   Bear Guido is a 77 year old male with a history of right ear surgeries about 30-35 years ago at [facility] for otosclerosis with subsequent sensorineural hearing loss who presents for follow up. At our last visit on 2022, he was doing well, and his hearing was stable. I performed a biopsy of right ear canal to rule out malignant transformation, and this returned as small fragments of benign respiratory type and squamous mucosa with marked acute and chronic inflammation with granulation tissue and focal bacterial clusters present on the surface of mucosa. In the interim, he was seen by Beena Everett on 11/15/2022 for ear cleaning.    Today, he reports that he still has some hearing on the right side at the very lowest frequencies which he finds frustrating because he only hears trucks and other similar sounds. He uses a CROS hearing aid in the car or other similar situations, but he does not find it helpful when he is out and about. Denies otorrhea or otalgia.     Review of Systems:   Pertinent items are noted in HPI or as in patient entered ROS below, remainder of complete ROS is negative.       2023     7:35 AM    ENT ROS   Ears, Nose, Throat Hearing loss    Ringing/noise in ears         Physical Exam:   /72 (BP Location: Right arm, Patient Position: Chair, Cuff Size: Adult Regular)   Pulse 82   Ht 1.651 m (5' 5\")   Wt 61.2 kg (135 lb)   BMI 22.47 kg/m       Constitutional:  The patient was accompanied, well-groomed, and in no acute distress.     Skin: Normal:  warm and pink without rash   Neurologic: Alert and oriented x 3.  CN's III-XII within normal limits.  Voice normal.    Psychiatric: The patient's affect was calm, cooperative, and appropriate.     Communication:  Normal; communicates verbally, normal voice " quality.   Respiratory: Breathing comfortably without stridor or exertion of accessory muscles.    Head/Face:  No lesions or scars   Eyes: Pupils were equal and reactive.  Extraocular movement intact.     Ears: Pinnae and tragus non-tender.        Otologic microscope exam:  Right ear: Thick concretion of wax and debris along medial canal and covering TM which was debrided with alligator, pick, and suction. Reconstructed TM. Darker green crust filling anterior sulcus. Excavation of EAC medially inferior to TM. Small area of granulation tissue stable from prior.  Left ear: Dry flaky cerumen was suctioned from posterior EAC. TM intact. Well aerated middle ear.    Audiogram:  AUDIOGRAM: He underwent an audiogram today. This demonstrated:      Right: Speech reception threshold is DNT dB with DNT% word recognition. Tympanogram DNT type   Left: Speech reception threshold is 20 dB with 92% word recognition. Tympanogram A type     Audiogram was independently reviewed     Pathology from last visit:  A. RIGHT EAR CANAL LESION, BIOPSY:  -Small fragments of benign respiratory type and squamous mucosa with marked acute and chronic inflammation with granulation tissue.  -Focal bacterial clusters present on the surface of mucosa.    Assessment and Plan:  Bear Guido is a 77 year old male with a history of right ear surgeries about 30-35 years ago at [facility] for otosclerosis with subsequent sensorineural hearing loss who presents for follow up. We reviewed the results of his audiogram which was stable. I performed ear cleaning today, and his ears were stable on exam today. I recommended that he avoid using q-tips in his ears. After each cleaning, he should use Ciprodex drops for one week on the right side. He would like to refrain from amplification. He can continue routine ear cleanings in 6 months with Beena Everett, and I will see him in one year with an audiogram.         Scribe Disclosure:  I, Ad Hollis, am  serving as a scribe to document services personally performed by Sonja Thornton MD at this visit, based upon the provider's statements to me. All documentation has been reviewed by the aforementioned provider prior to being entered into the official medical record.    The documentation recorded by the scribe accurately reflects the services I personally performed and the decisions made by me.    Sonja Thornton MD  Otology & Neurotology  [facility]

## 2023-05-12 ENCOUNTER — OFFICE VISIT (OUTPATIENT)
Dept: AUDIOLOGY | Facility: CLINIC | Age: 78
End: 2023-05-12
Attending: OTOLARYNGOLOGY
Payer: COMMERCIAL

## 2023-05-12 ENCOUNTER — OFFICE VISIT (OUTPATIENT)
Dept: OTOLARYNGOLOGY | Facility: CLINIC | Age: 78
End: 2023-05-12
Payer: COMMERCIAL

## 2023-05-12 VITALS
WEIGHT: 135 LBS | BODY MASS INDEX: 22.49 KG/M2 | HEIGHT: 65 IN | SYSTOLIC BLOOD PRESSURE: 119 MMHG | DIASTOLIC BLOOD PRESSURE: 72 MMHG | HEART RATE: 82 BPM

## 2023-05-12 DIAGNOSIS — H90.3 BILATERAL SENSORINEURAL HEARING LOSS: Primary | ICD-10-CM

## 2023-05-12 DIAGNOSIS — H90.3 ASYMMETRICAL SENSORINEURAL HEARING LOSS: ICD-10-CM

## 2023-05-12 DIAGNOSIS — L92.9 GRANULATION TISSUE OF EAR CANAL: ICD-10-CM

## 2023-05-12 PROCEDURE — 92550 TYMPANOMETRY & REFLEX THRESH: CPT | Mod: 52 | Performed by: AUDIOLOGIST

## 2023-05-12 PROCEDURE — 92504 EAR MICROSCOPY EXAMINATION: CPT | Performed by: OTOLARYNGOLOGY

## 2023-05-12 PROCEDURE — 99213 OFFICE O/P EST LOW 20 MIN: CPT | Mod: 25 | Performed by: OTOLARYNGOLOGY

## 2023-05-12 PROCEDURE — 92557 COMPREHENSIVE HEARING TEST: CPT | Mod: 52 | Performed by: AUDIOLOGIST

## 2023-05-12 ASSESSMENT — PAIN SCALES - GENERAL: PAINLEVEL: NO PAIN (0)

## 2023-05-12 NOTE — NURSING NOTE
"Chief Complaint   Patient presents with     Follow Up     Blood pressure 119/72, pulse 82, height 1.651 m (5' 5\"), weight 61.2 kg (135 lb).    Kaylynn Gonzalez LPN    "

## 2023-05-12 NOTE — PATIENT INSTRUCTIONS
You were seen in the ENT Clinic today by Dr. Thornton. If you have any questions or concerns after your appointment, please contact us (see below)      2.   Please return to clinic in 6 months for an appointment with Beena DUMONT and return to clinic in one year with Dr. Thornton with an audiogram prior.         How to Contact Us:  Send a [email or web address] message to your provider. Our team will respond to you via [email or web address]. Occasionally, we will need to call you to get further information.  For urgent matters (Monday-Friday), call the ENT Clinic: [phone] and speak with a call center team member - they will route your call appropriately.   If you'd like to speak directly with a nurse, please find our contact information below. We do our best to check voicemail frequently throughout the day, and will work to call you back within 1-2 days. For urgent matters, please use the general clinic phone numbers listed above.      Cassy GRIFFITHS RN  ENT RN Care Coordinator  Direct: [phone]    Kaylynn ROBLEDO LPN  Direct: [phone]

## 2023-05-12 NOTE — LETTER
"2023       RE: Bear Guido  [address] [address]     Dear Colleague,    Thank you for referring your patient, Bear Guido, to the [facility] NO[facility] [place] [facility]. Please see a copy of my visit note below.      Neurotology Clinic      Name: Bear Guido  MRN: [id number]  Age: 77 year old  : 2023      Chief Complaint:   Follow up     History of Present Illness:   Bear Guido is a 77 year old male with a history of right ear surgeries about 30-35 years ago at [facility] for otosclerosis with subsequent sensorineural hearing loss who presents for follow up. At our last visit on 2022, he was doing well, and his hearing was stable. I performed a biopsy of right ear canal to rule out malignant transformation, and this returned as small fragments of benign respiratory type and squamous mucosa with marked acute and chronic inflammation with granulation tissue and focal bacterial clusters present on the surface of mucosa. In the interim, he was seen by Beena Everett on 11/15/2022 for ear cleaning.    Today, he reports that he still has some hearing on the right side at the very lowest frequencies which he finds frustrating because he only hears trucks and other similar sounds. He uses a CROS hearing aid in the car or other similar situations, but he does not find it helpful when he is out and about. Denies otorrhea or otalgia.     Review of Systems:   Pertinent items are noted in HPI or as in patient entered ROS below, remainder of complete ROS is negative.       2023     7:35 AM   [facility] ENT ROS   Ears, Nose, Throat Hearing loss    Ringing/noise in ears         Physical Exam:   /72 (BP Location: Right arm, Patient Position: Chair, Cuff Size: Adult Regular)   Pulse 82   Ht 1.651 m (5' 5\")   Wt 61.2 kg (135 lb)   BMI 22.47 kg/m       Constitutional:  The patient was accompanied, " well-groomed, and in no acute distress.     Skin: Normal:  warm and pink without rash   Neurologic: Alert and oriented x 3.  CN's III-XII within normal limits.  Voice normal.    Psychiatric: The patient's affect was calm, cooperative, and appropriate.     Communication:  Normal; communicates verbally, normal voice quality.   Respiratory: Breathing comfortably without stridor or exertion of accessory muscles.    Head/Face:  No lesions or scars   Eyes: Pupils were equal and reactive.  Extraocular movement intact.     Ears: Pinnae and tragus non-tender.        Otologic microscope exam:  Right ear: Thick concretion of wax and debris along medial canal and covering TM which was debrided with alligator, pick, and suction. Reconstructed TM. Darker green crust filling anterior sulcus. Excavation of EAC medially inferior to TM. Small area of granulation tissue stable from prior.  Left ear: Dry flaky cerumen was suctioned from posterior EAC. TM intact. Well aerated middle ear.    Audiogram:  AUDIOGRAM: He underwent an audiogram today. This demonstrated:      Right: Speech reception threshold is DNT dB with DNT% word recognition. Tympanogram DNT type   Left: Speech reception threshold is 20 dB with 92% word recognition. Tympanogram A type     Audiogram was independently reviewed     Pathology from last visit:  A. RIGHT EAR CANAL LESION, BIOPSY:  -Small fragments of benign respiratory type and squamous mucosa with marked acute and chronic inflammation with granulation tissue.  -Focal bacterial clusters present on the surface of mucosa.    Assessment and Plan:  Bear Guido is a 77 year old male with a history of right ear surgeries about 30-35 years ago at [facility] for otosclerosis with subsequent sensorineural hearing loss who presents for follow up. We reviewed the results of his audiogram which was stable. I performed ear cleaning today, and his ears were stable on exam today. I recommended that he avoid using q-tips  in his ears. After each cleaning, he should use Ciprodex drops for one week on the right side. He would like to refrain from amplification. He can continue routine ear cleanings in 6 months with Beena Everett, and I will see him in one year with an audiogram.         Scribe Disclosure:  I, Ad Hollis, am serving as a scribe to document services personally performed by Sonja Thornton MD at this visit, based upon the provider's statements to me. All documentation has been reviewed by the aforementioned provider prior to being entered into the official medical record.    The documentation recorded by the scribe accurately reflects the services I personally performed and the decisions made by me.    Sonja Thornton MD  Otology & Neurotology  [facility]

## 2023-05-12 NOTE — PROGRESS NOTES
AUDIOLOGY REPORT    SUMMARY: Audiology visit completed. See audiogram for results.      RECOMMENDATIONS: Follow-up with ENT.    Sammy Lenz, [facility] [place]  Licensed Audiologist  [place] License #[id number]

## 2023-05-19 ENCOUNTER — MYC MEDICAL ADVICE (OUTPATIENT)
Dept: FAMILY MEDICINE | Facility: CLINIC | Age: 78
End: 2023-05-19
Payer: COMMERCIAL

## 2023-05-19 DIAGNOSIS — M54.50 ACUTE BILATERAL LOW BACK PAIN WITHOUT SCIATICA: Primary | ICD-10-CM

## 2023-05-19 NOTE — TELEPHONE ENCOUNTER
JS,  Please see below [employer] message and advise.  Pended PT referral if approved.  Thanks,  Kim BRENNAN RN

## 2023-05-27 ENCOUNTER — HEALTH MAINTENANCE LETTER (OUTPATIENT)
Age: 78
End: 2023-05-27

## 2023-06-07 ENCOUNTER — THERAPY VISIT (OUTPATIENT)
Dept: PHYSICAL THERAPY | Facility: CLINIC | Age: 78
End: 2023-06-07
Attending: PHYSICIAN ASSISTANT
Payer: COMMERCIAL

## 2023-06-07 DIAGNOSIS — M54.50 ACUTE LOW BACK PAIN: ICD-10-CM

## 2023-06-07 DIAGNOSIS — M54.50 ACUTE BILATERAL LOW BACK PAIN WITHOUT SCIATICA: ICD-10-CM

## 2023-06-07 PROCEDURE — 97161 PT EVAL LOW COMPLEX 20 MIN: CPT | Mod: GP | Performed by: PHYSICAL THERAPIST

## 2023-06-07 PROCEDURE — 97112 NEUROMUSCULAR REEDUCATION: CPT | Mod: GP | Performed by: PHYSICAL THERAPIST

## 2023-06-07 PROCEDURE — 97110 THERAPEUTIC EXERCISES: CPT | Mod: GP | Performed by: PHYSICAL THERAPIST
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
## 2023-07-10 ENCOUNTER — MYC MEDICAL ADVICE (OUTPATIENT)
Dept: FAMILY MEDICINE | Facility: CLINIC | Age: 78
End: 2023-07-10
Payer: COMMERCIAL

## 2023-07-10 DIAGNOSIS — Z00.00 ROUTINE GENERAL MEDICAL EXAMINATION AT A HEALTH CARE FACILITY: Primary | ICD-10-CM

## 2023-07-10 DIAGNOSIS — E78.5 HYPERLIPIDEMIA WITH TARGET LDL LESS THAN 130: ICD-10-CM

## 2023-07-10 DIAGNOSIS — Z12.5 SCREENING PSA (PROSTATE SPECIFIC ANTIGEN): ICD-10-CM

## 2023-07-10 RX ORDER — ATORVASTATIN CALCIUM 10 MG/1
TABLET, FILM COATED ORAL
Qty: 60 TABLET | Refills: 0 | Status: SHIPPED | OUTPATIENT
Start: 2023-07-10 | End: 2023-09-05

## 2023-07-14 NOTE — TELEPHONE ENCOUNTER
JS,  Please see below [employer] message and advise.  Pended orders if approved.  Thanks,  Kim BRENNAN RN

## 2023-08-07 NOTE — PROGRESS NOTES
"SUBJECTIVE:   Bear is a 77 year old who presents for Preventive Visit.      4/17/2023     7:05 AM   Additional Questions   Roomed by Melania RAJAN       Are you in the first 12 months of your Medicare coverage?  No    Healthy Habits:     In general, how would you rate your overall health?  Excellent    Frequency of exercise:  4-5 days/week    Duration of exercise:  30-45 minutes    Do you usually eat at least 4 servings of fruit and vegetables a day, include whole grains    & fiber and avoid regularly eating high fat or \"junk\" foods?  Yes    Taking medications regularly:  Yes    Medication side effects:  None    Ability to successfully perform activities of daily living:  No assistance needed    Home Safety:  No safety concerns identified    Hearing Impairment:  Difficulty following a conversation in a noisy restaurant or crowded room, difficulty following dialogue in the theater, need to ask people to speak up or repeat themselves and difficulty understanding soft or whispered speech    In the past 6 months, have you been bothered by leaking of urine?  No    In general, how would you rate your overall mental or emotional health?  Excellent    Additional concerns today:  No      Wellness Visit Notes:    -Last colon cancer screening done via colonoscopy on 10/10/2017 (impression: redundant left colon, otherwise normal). Further screening not recommended.   -Last PSA lab work performed 4/07/2022 (value of 2.37 micrograms/L).   -Immunizations: Pt is due for COVID vaccine. Pt would like to wait on COVID vaccine today, hoping for new vaccine targeting new variants this fall.   -Asthma Action Plan document created in Communications; will auto-send via TYFFON upon signing visit  -ACP: Discussed medical directive/ACP in detail with patient today and provided our Honoring Choices documents. Advised pt return a copy to clinic once completed; pt expressed understanding.   -Education: Pt education provided on Hearing Concerns. Pt " currently is satisfied with his hearing aids; declines audiology referral to check hearing aids today.   -Pt denies needing any refills today      Have you ever done Advance Care Planning? (For example, a Health Directive, POLST, or a discussion with a medical provider or your loved ones about your wishes): Yes, patient states has an Advance Care Planning document and will bring a copy to the clinic.       Fall risk  Fallen 2 or more times in the past year?: No  Any fall with injury in the past year?: No    Cognitive Screening   1) Repeat 3 items (Leader, Season, Table)    2) Clock draw: NORMAL  3) 3 item recall: Recalls 3 objects  Results: 3 items recalled: COGNITIVE IMPAIRMENT LESS LIKELY    Mini-CogTM Copyright S Helder. Licensed by the author for use in [facility]; reprinted with permission (cruz@[facility]). All rights reserved.      Do you have sleep apnea, excessive snoring or daytime drowsiness? : no    Reviewed and updated as needed this visit by clinical staff   Tobacco  Allergies  Meds  Problems             Reviewed and updated as needed this visit by Provider                 Social History     Tobacco Use    Smoking status: Never    Smokeless tobacco: Never   Substance Use Topics    Alcohol use: Yes     Alcohol/week: 0.0 standard drinks of alcohol     Comment: 1 - 2 drinks daily in summer, less in winter             8/8/2023     8:47 AM   Alcohol Use   Prescreen: >3 drinks/day or >7 drinks/week? No          No data to display              Do you have a current opioid prescription? No  Do you use any other controlled substances or medications that are not prescribed by a provider? None              Current providers sharing in care for this patient include:   Patient Care Team:  Norm Tenorio PA-C as PCP - General (Family Medicine)  Hortensia Shah AuD as Audiologist (Audiology)  Sonja Thornton MD as MD (Otolaryngology)  Norm Tenorio PA-C as Assigned PCP  Norberto  Sonja Parham MD as Assigned Surgical Provider    The following health maintenance items are reviewed in Epic and correct as of today:  Health Maintenance   Topic Date Due    COVID-19 Vaccine (6 - Moderna series) 02/06/2023    INFLUENZA VACCINE (1) 09/01/2023    ASTHMA CONTROL TEST  10/17/2023    MEDICARE ANNUAL WELLNESS VISIT  08/08/2024    ANNUAL REVIEW OF HM ORDERS  08/08/2024    ASTHMA ACTION PLAN  08/08/2024    FALL RISK ASSESSMENT  08/08/2024    DTAP/TDAP/TD IMMUNIZATION (3 - Td or Tdap) 01/06/2026    LIPID  04/07/2027    COLORECTAL CANCER SCREENING  10/10/2027    ADVANCE CARE PLANNING  08/08/2028    HEPATITIS C SCREENING  Completed    PHQ-2 (once per calendar year)  Completed    Pneumococcal Vaccine: 65+ Years  Completed    ZOSTER IMMUNIZATION  Completed    IPV IMMUNIZATION  Aged Out    MENINGITIS IMMUNIZATION  Aged Out     BP Readings from Last 3 Encounters:   08/08/23 122/73   05/12/23 119/72   04/17/23 119/73    Wt Readings from Last 3 Encounters:   08/08/23 60.3 kg (132 lb 14.4 oz)   05/12/23 61.2 kg (135 lb)   04/17/23 61.5 kg (135 lb 9.6 oz)                          Review of Systems   Constitutional:  Negative for chills and fever.   HENT:  Negative for congestion, ear pain, hearing loss and sore throat.    Eyes:  Negative for pain and visual disturbance.   Respiratory:  Negative for cough and shortness of breath.    Cardiovascular:  Negative for chest pain, palpitations and peripheral edema.   Gastrointestinal:  Negative for abdominal pain, constipation, diarrhea, heartburn, hematochezia and nausea.   Genitourinary:  Negative for dysuria, frequency, genital sores, hematuria, impotence, penile discharge and urgency.   Musculoskeletal:  Negative for arthralgias, joint swelling and myalgias.   Skin:  Negative for rash.   Neurological:  Negative for dizziness, weakness, headaches and paresthesias.   Psychiatric/Behavioral:  Negative for mood changes. The patient is not nervous/anxious.   "        OBJECTIVE:   /73   Pulse 96   Temp 97.9  F (36.6  C) (Temporal)   Resp 16   Ht 1.651 m (5' 5\")   Wt 60.3 kg (132 lb 14.4 oz)   SpO2 93%   BMI 22.12 kg/m   Estimated body mass index is 22.12 kg/m  as calculated from the following:    Height as of this encounter: 1.651 m (5' 5\").    Weight as of this encounter: 60.3 kg (132 lb 14.4 oz).  Physical Exam  GENERAL: alert and no distress  EYES: Eyes grossly normal to inspection  HENT: ear canals and TM's normal, nose and mouth without ulcers or lesions  NECK: no adenopathy, no asymmetry, masses, or scars and thyroid normal to palpation  RESP: lungs clear to auscultation - no rales, rhonchi or wheezes  CV: regular rate and rhythm, normal S1 S2, no S3 or S4, no murmur, click or rub, no peripheral edema and peripheral pulses strong  ABDOMEN: soft, nontender, no hepatosplenomegaly, no masses and bowel sounds normal  MS: no gross musculoskeletal defects noted, no edema  SKIN: no suspicious lesions or rashes  NEURO: Normal strength and tone, mentation intact and speech normal  PSYCH: mentation appears normal, affect normal/bright    Diagnostic Test Results:  Labs reviewed in Epic    ASSESSMENT / PLAN:   (Z00.00) Encounter for Medicare annual wellness exam  (primary encounter diagnosis)  Comment: continues to do very well  Plan:     (Z12.5) Screening for malignant neoplasm of prostate  Comment: plans to return for fasting labs  Plan: Prostate spec antigen screen            (E78.5) Hyperlipidemia with target LDL less than 130  Comment: as above.  Will continue lipitor  Plan:     (J45.40) Moderate persistent asthma without complication  Comment: stable on breo  Plan:           COUNSELING:  Reviewed preventive health counseling, as reflected in patient instructions       Regular exercise       Healthy diet/nutrition       Prostate cancer screening        He reports that he has never smoked. He has never used smokeless tobacco.      Appropriate preventive " services were discussed with this patient, including applicable screening as appropriate for cardiovascular disease, diabetes, osteopenia/osteoporosis, and glaucoma.  As appropriate for age/gender, discussed screening for colorectal cancer, prostate cancer, breast cancer, and cervical cancer. Checklist reviewing preventive services available has been given to the patient.    Reviewed patients plan of care and provided an AVS. The Basic Care Plan (routine screening as documented in Health Maintenance) for Bear meets the Care Plan requirement. This Care Plan has been established and reviewed with the Patient.          Norm Tenorio PA-C  [facility]    Identified Health Risks:  I have reviewed Opioid Use Disorder and Substance Use Disorder risk factors and made any needed referrals. The patient was provided with written information regarding signs of hearing loss.

## 2023-08-08 ENCOUNTER — OFFICE VISIT (OUTPATIENT)
Dept: FAMILY MEDICINE | Facility: CLINIC | Age: 78
End: 2023-08-08
Payer: COMMERCIAL

## 2023-08-08 VITALS
TEMPERATURE: 97.9 F | SYSTOLIC BLOOD PRESSURE: 122 MMHG | HEIGHT: 65 IN | OXYGEN SATURATION: 93 % | DIASTOLIC BLOOD PRESSURE: 73 MMHG | BODY MASS INDEX: 22.14 KG/M2 | HEART RATE: 96 BPM | RESPIRATION RATE: 16 BRPM | WEIGHT: 132.9 LBS

## 2023-08-08 DIAGNOSIS — E78.5 HYPERLIPIDEMIA WITH TARGET LDL LESS THAN 130: ICD-10-CM

## 2023-08-08 DIAGNOSIS — Z00.00 ENCOUNTER FOR MEDICARE ANNUAL WELLNESS EXAM: Primary | ICD-10-CM

## 2023-08-08 DIAGNOSIS — J45.40 MODERATE PERSISTENT ASTHMA WITHOUT COMPLICATION: ICD-10-CM

## 2023-08-08 DIAGNOSIS — Z12.5 SCREENING FOR MALIGNANT NEOPLASM OF PROSTATE: ICD-10-CM

## 2023-08-08 PROCEDURE — G0439 PPPS, SUBSEQ VISIT: HCPCS | Performed by: PHYSICIAN ASSISTANT

## 2023-08-08 ASSESSMENT — ENCOUNTER SYMPTOMS
PALPITATIONS: 0
ARTHRALGIAS: 0
SHORTNESS OF BREATH: 0
HEMATURIA: 0
FREQUENCY: 0
JOINT SWELLING: 0
NAUSEA: 0
CHILLS: 0
ABDOMINAL PAIN: 0
HEADACHES: 0
DYSURIA: 0
HEARTBURN: 0
PARESTHESIAS: 0
NERVOUS/ANXIOUS: 0
FEVER: 0
WEAKNESS: 0
HEMATOCHEZIA: 0
MYALGIAS: 0
DIZZINESS: 0
CONSTIPATION: 0
DIARRHEA: 0
EYE PAIN: 0
COUGH: 0
SORE THROAT: 0

## 2023-08-08 ASSESSMENT — PAIN SCALES - GENERAL: PAINLEVEL: NO PAIN (0)

## 2023-08-08 ASSESSMENT — ACTIVITIES OF DAILY LIVING (ADL): CURRENT_FUNCTION: NO ASSISTANCE NEEDED

## 2023-08-08 NOTE — LETTER
My Asthma Action Plan    Name: Bear Guido   YOB: 1945  Date: 8/7/2023   My doctor: Norm Tenorio PA-C   My clinic: [facility]        My Control Medicine: Fluticasone furoate + vilanterol (Breo Ellipta)  -  200/25mcg    My Rescue Medicine: Albuterol (Proair/Ventolin/Proventil HFA) 2-4 puffs EVERY 4 HOURS as needed. Use a spacer if recommended by your provider.   My Asthma Severity:   Moderate Persistent  Know your asthma triggers:   pollens            GREEN ZONE   Good Control  I feel good  No cough or wheeze  Can work, sleep and play without asthma symptoms       Take your asthma control medicine every day.     If exercise triggers your asthma, take your rescue medication  15 minutes before exercise or sports, and  During exercise if you have asthma symptoms  Spacer to use with inhaler: If you have a spacer, make sure to use it with your inhaler             YELLOW ZONE Getting Worse  I have ANY of these:  I do not feel good  Cough or wheeze  Chest feels tight  Wake up at night   Keep taking your Green Zone medications  Start taking your rescue medicine:  every 20 minutes for up to 1 hour. Then every 4 hours for 24-48 hours.  If you stay in the Yellow Zone for more than 12-24 hours, contact your doctor.  If you do not return to the Green Zone in 12-24 hours or you get worse, start taking your oral steroid medicine if prescribed by your provider.           RED ZONE Medical Alert - Get Help  I have ANY of these:  I feel awful  Medicine is not helping  Breathing getting harder  Trouble walking or talking  Nose opens wide to breathe       Take your rescue medicine NOW  If your provider has prescribed an oral steroid medicine, start taking it NOW  Call your doctor NOW  If you are still in the Red Zone after 20 minutes and you have not reached your doctor:  Take your rescue medicine again and  Call 911 or go to the emergency room right away    See your regular doctor within 2  weeks of an Emergency Room or Urgent Care visit for follow-up treatment.          Annual Reminders:  Meet with Asthma Educator,  Flu Shot in the Fall, consider Pneumonia Vaccination for patients with asthma (aged 19 and older).    Pharmacy: [id number] - [place], [place] - [address] AT [address] [place] [address]    Electronically signed by Norm Tenorio PA-C   Date: 08/07/23                      Asthma Triggers  How To Control Things That Make Your Asthma Worse    Triggers are things that make your asthma worse.  Look at the list below to help you find your triggers and what you can do about them.  You can help prevent asthma flare-ups by staying away from your triggers.      Trigger                                                          What you can do   Cigarette Smoke  Tobacco smoke can make asthma worse. Do not allow smoking in your home, car or around you.  Be sure no one smokes at a child s day care or school.  If you smoke, ask your health care provider for ways to help you quit.  Ask family members to quit too.  Ask your health care provider for a referral to Quit Plan to help you quit smoking, or call 1-566-364-PLAN.     Colds, Flu, Bronchitis  These are common triggers of asthma. Wash your hands often.  Don t touch your eyes, nose or mouth.  Get a flu shot every year.     Dust Mites  These are tiny bugs that live in cloth or carpet. They are too small to see. Wash sheets and blankets in hot water every week.   Encase pillows and mattress in dust mite proof covers.  Avoid having carpet if you can. If you have carpet, vacuum weekly.   Use a dust mask and HEPA vacuum.   Pollen and Outdoor Mold  Some people are allergic to trees, grass, or weed pollen, or molds. Try to keep your windows closed.  Limit time out doors when pollen count is high.   Ask you health care provider about taking medicine during allergy season.     Animal Dander  Some people are allergic to skin flakes,  urine or saliva from pets with fur or feathers. Keep pets with fur or feathers out of your home.    If you can t keep the pet outdoors, then keep the pet out of your bedroom.  Keep the bedroom door closed.  Keep pets off cloth furniture and away from stuffed toys.     Mice, Rats, and Cockroaches   Some people are allergic to the waste from these pests.   Cover food and garbage.  Clean up spills and food crumbs.  Store grease in the refrigerator.   Keep food out of the bedroom.   Indoor Mold  This can be a trigger if your home has high moisture. Fix leaking faucets, pipes, or other sources of water.   Clean moldy surfaces.  Dehumidify basement if it is damp and smelly.   Smoke, Strong Odors, and Sprays  These can reduce air quality. Stay away from strong odors and sprays, such as perfume, powder, hair spray, paints, smoke incense, paint, cleaning products, candles and new carpet.   Exercise or Sports  Some people with asthma have this trigger. Be active!  Ask your doctor about taking medicine before sports or exercise to prevent symptoms.    Warm up for 5-10 minutes before and after sports or exercise.     Other Triggers of Asthma  Cold air:  Cover your nose and mouth with a scarf.  Sometimes laughing or crying can be a trigger.  Some medicines and food can trigger asthma.

## 2023-08-08 NOTE — PATIENT INSTRUCTIONS
Patient Education   Personalized Prevention Plan  You are due for the preventive services outlined below.  Your care team is available to assist you in scheduling these services.  If you have already completed any of these items, please share that information with your care team to update in your medical record.  Health Maintenance Due   Topic Date Due     ANNUAL REVIEW OF HM ORDERS  Never done     COVID-19 Vaccine (6 - Moderna series) 02/06/2023     Hearing Loss: Care Instructions  Overview     Hearing loss is a sudden or slow decrease in how well you hear. It can range from slight to profound. Permanent hearing loss can occur with aging. It also can happen when you are exposed long-term to loud noise. Examples include listening to loud music, riding motorcycles, or being around other loud machines.  Hearing loss can affect your work and home life. It can make you feel lonely or depressed. You may feel that you have lost your independence. But hearing aids and other devices can help you hear better and feel connected to others.  Follow-up care is a key part of your treatment and safety. Be sure to make and go to all appointments, and call your doctor if you are having problems. It's also a good idea to know your test results and keep a list of the medicines you take.  How can you care for yourself at home?  Avoid loud noises whenever possible. This helps keep your hearing from getting worse.  Always wear hearing protection around loud noises.  Wear a hearing aid as directed.  A professional can help you pick a hearing aid that will work best for you.  You can also get hearing aids over the counter for mild to moderate hearing loss.  Have hearing tests as your doctor suggests. They can show whether your hearing has changed. Your hearing aid may need to be adjusted.  Use other devices as needed. These may include:  Telephone amplifiers and hearing aids that can connect to a television, stereo, radio, or  "microphone.  Devices that use lights or vibrations. These alert you to the doorbell, a ringing telephone, or a baby monitor.  Television closed-captioning. This shows the words at the bottom of the screen. Most new TVs can do this.  TTY (text telephone). This lets you type messages back and forth on the telephone instead of talking or listening. These devices are also called TDD. When messages are typed on the keyboard, they are sent over the phone line to a receiving TTY. The message is shown on a monitor.  Use text messaging, social media, and email if it is hard for you to communicate by telephone.  Try to learn a listening technique called speechreading. It is not lipreading. You pay attention to people's gestures, expressions, posture, and tone of voice. These clues can help you understand what a person is saying. Face the person you are talking to, and have them face you. Make sure the lighting is good. You need to see the other person's face clearly.  Think about counseling if you need help to adjust to your hearing loss.  When should you call for help?  Watch closely for changes in your health, and be sure to contact your doctor if:    You think your hearing is getting worse.     You have new symptoms, such as dizziness or nausea.   Where can you learn more?  Go to https://www.[employer].net/patiented  Enter R798 in the search box to learn more about \"Hearing Loss: Care Instructions.\"  Current as of: March 1, 2023               Content Version: 13.7    4353-8636 [employer].   Care instructions adapted under license by your healthcare professional. If you have questions about a medical condition or this instruction, always ask your healthcare professional. [employer] disclaims any warranty or liability for your use of this information.         "

## 2023-08-14 NOTE — PROGRESS NOTES
DISCHARGE  Reason for Discharge: Patient has failed to schedule further appointments.    Equipment Issued: none    Discharge Plan: Will discharge pt since he hasn't returned for further PT.    Referring Provider:  Norm Tenorio

## 2023-09-03 DIAGNOSIS — E78.5 HYPERLIPIDEMIA WITH TARGET LDL LESS THAN 130: ICD-10-CM

## 2023-09-05 RX ORDER — ATORVASTATIN CALCIUM 10 MG/1
TABLET, FILM COATED ORAL
Qty: 90 TABLET | Refills: 1 | Status: SHIPPED | OUTPATIENT
Start: 2023-09-05 | End: 2024-02-29

## 2023-09-05 NOTE — TELEPHONE ENCOUNTER
Routing refill request to provider for review/approval because:  LDL.  Physical in last 4 weeks.  Kim ACUNA RN

## 2023-11-14 ENCOUNTER — OFFICE VISIT (OUTPATIENT)
Dept: OTOLARYNGOLOGY | Facility: CLINIC | Age: 78
End: 2023-11-14
Payer: COMMERCIAL

## 2023-11-14 VITALS
HEIGHT: 65 IN | SYSTOLIC BLOOD PRESSURE: 149 MMHG | HEART RATE: 75 BPM | DIASTOLIC BLOOD PRESSURE: 83 MMHG | OXYGEN SATURATION: 99 % | BODY MASS INDEX: 22.66 KG/M2 | WEIGHT: 136 LBS

## 2023-11-14 DIAGNOSIS — H92.11 OTORRHEA, RIGHT: Primary | ICD-10-CM

## 2023-11-14 PROCEDURE — 92504 EAR MICROSCOPY EXAMINATION: CPT | Performed by: REGISTERED NURSE

## 2023-11-14 PROCEDURE — 99212 OFFICE O/P EST SF 10 MIN: CPT | Mod: 25 | Performed by: REGISTERED NURSE

## 2023-11-14 RX ORDER — PREDNISOLONE ACETATE 10 MG/ML
2 SUSPENSION/ DROPS OPHTHALMIC 2 TIMES DAILY
Qty: 5 ML | Refills: 0 | Status: SHIPPED | OUTPATIENT
Start: 2023-11-14 | End: 2024-09-09

## 2023-11-14 RX ORDER — CIPROFLOXACIN AND DEXAMETHASONE 3; 1 MG/ML; MG/ML
4 SUSPENSION/ DROPS AURICULAR (OTIC) 2 TIMES DAILY
Qty: 7.5 ML | Refills: 0 | Status: SHIPPED | OUTPATIENT
Start: 2023-11-14 | End: 2023-11-14

## 2023-11-14 RX ORDER — CIPROFLOXACIN HYDROCHLORIDE 3.5 MG/ML
2 SOLUTION/ DROPS TOPICAL 2 TIMES DAILY
Qty: 5 ML | Refills: 0 | Status: SHIPPED | OUTPATIENT
Start: 2023-11-14 | End: 2024-09-09

## 2023-11-14 ASSESSMENT — PAIN SCALES - GENERAL: PAINLEVEL: NO PAIN (0)

## 2023-11-14 NOTE — LETTER
11/14/2023       RE: Bear Guido  [address] [facility] [address] [address]     Dear Colleague,    Thank you for referring your patient, Bear Guido, to the [facility] EAR NOSE AND THROAT CLINIC [place] at [facility]. Please see a copy of my visit note below.      Otolaryngology Clinic  November 14, 2023    HPI:  Bear Guido is here for a recheck of their right ear.  Patient has a history of right ear surgeries for otosclerosis that were completed at an outside facility about 30 to 35 years ago.  Has been following with Dr. Thornton for monitoring of right ear canal granulation tissue versus cancer.  Dr. Thornton has biopsied this tissue multiple times only demonstrating chronic inflammation. Last biopsy was on 5/13/22. Last seen in clinic 5/12/23 by Dr. Thornton. Green crusting and drainage on exam. Recommended ciprodex drops and follow-up exam in 6 months for routine ear cleaning.       Today, patient any otalgia, otorrhea or change in hearing. Wears a hearing aid in the left ear.      Otologic microscope exam:    Patient's ear pathology required use of the binocular microscope for the purpose of cleaning and improving visualization in order to assure a more accurate diagnostic evaluation.    Right ear was examined under the microscope. Hard ceruminous crust adherent against anterior canal onto TM. It was cleaned with right angle hook, suction, and alligator forceps. Green purulent drainage in anterior superior canal cleaned with suction. Once cleaned, TM visualized under microscope. Reconstructed TM is intact, nicely aerated middle ear space.     Left ear was also examined under the microscope. Small amount of flaky ceruminous crusting in canal. It was cleaned with right angle hook and suction. Once cleaned, TM visualized under microscope. Normal appearing TM, nicely aerated middle ear space.     Assessment and Plan:  1. Otorrhea, right  The patient presents for  recheck and cleaning of the right ear. Continues to have significant crusting in canal. I cannot visualized any granulation in canal today. There was some continued purulent drainage deep in the canal. Will have patient use ciprodex drops to the right ear for the next week. Return as scheduled for cleaning and audiogram with Dr. Thornton in May 2024.     Beena Everett DNP, APRN, CNP  Otolaryngology  Head & Neck Surgery  [phone]    20 minutes spent on the date of the encounter doing chart review, history and exam, documentation and further activities per the note excluding time spent examining and cleaning ears under microscopy.      Again, thank you for allowing me to participate in the care of your patient.      Sincerely,    Rachel Everett, NP

## 2023-11-14 NOTE — PROGRESS NOTES
Otolaryngology Clinic  November 14, 2023    HPI:  Bear Guido is here for a recheck of their right ear.  Patient has a history of right ear surgeries for otosclerosis that were completed at an outside facility about 30 to 35 years ago.  Has been following with Dr. Thornton for monitoring of right ear canal granulation tissue versus cancer.  Dr. Thornton has biopsied this tissue multiple times only demonstrating chronic inflammation. Last biopsy was on 5/13/22. Last seen in clinic 5/12/23 by Dr. Thornton. Green crusting and drainage on exam. Recommended ciprodex drops and follow-up exam in 6 months for routine ear cleaning.       Today, patient any otalgia, otorrhea or change in hearing. Wears a hearing aid in the left ear.      Otologic microscope exam:    Patient's ear pathology required use of the binocular microscope for the purpose of cleaning and improving visualization in order to assure a more accurate diagnostic evaluation.    Right ear was examined under the microscope. Hard ceruminous crust adherent against anterior canal onto TM. It was cleaned with right angle hook, suction, and alligator forceps. Green purulent drainage in anterior superior canal cleaned with suction. Once cleaned, TM visualized under microscope. Reconstructed TM is intact, nicely aerated middle ear space.     Left ear was also examined under the microscope. Small amount of flaky ceruminous crusting in canal. It was cleaned with right angle hook and suction. Once cleaned, TM visualized under microscope. Normal appearing TM, nicely aerated middle ear space.     Assessment and Plan:  1. Otorrhea, right  The patient presents for recheck and cleaning of the right ear. Continues to have significant crusting in canal. I cannot visualized any granulation in canal today. There was some continued purulent drainage deep in the canal. Will have patient use ciprodex drops to the right ear for the next week. Return as scheduled for cleaning and audiogram  with Dr. Thornton in May 2024.     Beena Everett DNP, APRN, CNP  Otolaryngology  Head & Neck Surgery  [phone]    20 minutes spent on the date of the encounter doing chart review, history and exam, documentation and further activities per the note excluding time spent examining and cleaning ears under microscopy.

## 2024-02-05 ENCOUNTER — MYC MEDICAL ADVICE (OUTPATIENT)
Dept: FAMILY MEDICINE | Facility: CLINIC | Age: 79
End: 2024-02-05
Payer: COMMERCIAL

## 2024-02-05 DIAGNOSIS — Z13.820 SCREENING FOR OSTEOPOROSIS: Primary | ICD-10-CM

## 2024-02-05 DIAGNOSIS — F51.02 TRANSIENT INSOMNIA: ICD-10-CM

## 2024-02-05 NOTE — TELEPHONE ENCOUNTER
JS,  Please see below [employer] message and advise.  Labs ok.  Pended DEXA and zolpidem.  Thanks,  Kim BRENNAN RN

## 2024-02-09 RX ORDER — ZOLPIDEM TARTRATE 5 MG/1
5 TABLET ORAL
Qty: 15 TABLET | Refills: 1 | Status: SHIPPED | OUTPATIENT
Start: 2024-02-09 | End: 2024-09-09

## 2024-02-09 NOTE — TELEPHONE ENCOUNTER
Med sent.  Unfortunately, medicare does not cover dexa in men, even with loss of height (claudio, I know).  He would have to sign a waiver before we can place order    Hal Tenorio PA-C

## 2024-02-29 DIAGNOSIS — E78.5 HYPERLIPIDEMIA WITH TARGET LDL LESS THAN 130: ICD-10-CM

## 2024-02-29 RX ORDER — ATORVASTATIN CALCIUM 10 MG/1
TABLET, FILM COATED ORAL
Qty: 90 TABLET | Refills: 1 | Status: SHIPPED | OUTPATIENT
Start: 2024-02-29

## 2024-05-11 NOTE — PROGRESS NOTES
"  Neurotology Clinic      Name: Bear Guido  MRN: [id number]  Age: 78 year old  : 2024      Chief Complaint:   Follow up     History of Present Illness:   Bear Guido is a 78 year old male with a history of right ear surgeries about 30-35 years ago at [facility] for otosclerosis with subsequent sensorineural hearing loss, who presents for his annual follow up. He has a history of otosclerosis, and biopsies from previous exams demonstrate chronic inflammation. He wears a hearing aid in his left ear. A biopsy on a lesion from the right ear () revealed small fragments of benign respiratory type and squamous mucosa with marked acute and chronic inflammation with granulation tissue and focal bacterial clusters present on the surface of mucosa. In the interim, is is seen by Beena Everett for routine ear cleaning.      His previous visit was on 2023, he said that he still had some hearing on the right side at the very lowest frequencies, which he found frustrating because he only heard trucks and other similar sounds. He uses a CROS hearing aid in the car or other similar situations, but he does not find it helpful when he is out and about. His audiogram revealed stable hearing. Today, he says that he is doing well, and has not had many new issues in regards to his hearing. He uses a left hearing aid.     Review of Systems:   Pertinent items are noted in HPI or as in patient entered ROS below, remainder of complete ROS is negative.       2024     7:24 AM    ENT ROS   Musculoskeletal Sore or stiff joints   Allergy/Immunology Allergies or hay fever         Physical Exam:   /78   Pulse 73   Temp 98.3  F (36.8  C)   Ht 1.651 m (5' 5\")   Wt 62.6 kg (138 lb)   SpO2 98%   BMI 22.96 kg/m       Constitutional:  The patient was accompanied by his wife, well-groomed, and in no acute distress.     Skin: Normal:  warm and pink without rash   Neurologic: Alert and oriented x 3.  CN's " III-XII within normal limits.  Voice normal.    Psychiatric: The patient's affect was calm, cooperative, and appropriate.     Communication:  Normal; communicates verbally, normal voice quality.   Respiratory: Breathing comfortably without stridor or exertion of accessory muscles.    Head/Face:  No lesions or scars. No sinus tenderness.     Eyes: Pupils were equal and reactive.  Extraocular movement intact.     Ears: Pinnae and tragus non-tender.         Otologic microscope exam:  Right ear: The posterior wall of the ear canal has some inflammatory tissue that was not present at the previous visit. I cleaned out the cerumen impaction and crusting. The floor of the ear canal that was excavated did not have inflammation tissue.   Left ear: Lined with healthy skin, no cerumen accumulation, normal TM    Audiogram: Most recent audiogram (05/14/2024).  AUDIOGRAM: He underwent an audiogram today. This demonstrated:  Results: Left: Normal hearing sloping to severe SNHL. Thresholds and word recognition are stable re: 5/12/2023. Right: DNT due to documented complete hearing loss. Tympanograms: WNL left. DNT right due to surgical hx. Reflexes: Left ipsi present at normal level and right contra absent. DNT right ipsi or left contra due to surgical hx.      Right: Speech reception threshold is DNT dB with DNT% word recognition. Tympanogram -- type   Left: Speech reception threshold is 25 dB with 96% word recognition. Tympanogram a type     Audiogram was independently reviewed    Imaging:  MR BRAIN W/O & W CONTRAST (12/2/2020)   ICD-10: Bilateral sensorineural hearing loss   Comparison: 10/7/2014 dated CT temporal                                                                 Impression:    No findings to explain patient's sensorineural hearing loss.  Mild leukokraurosis. Mild diffuse cerebral volume loss.    Outside records were independently reviewed.      Pathology:  RIGHT EAR, BIOPSY 11/21/21:  Small fragments of benign  respiratory type and squamous mucosa with marked acute and chronic inflammation with granulation tissue.  Focal bacterial clusters present on the surface of mucosa.     Pathology was independently reviewed.     Assessment and Plan:    Right profound sensorineural hearing loss from prior stapes surgery   Right cerumen impaction and canal excavation  Left high frequency sensorineural hearing loss     Bear Guido is a 78 year old male with a history of right ear surgeries about 30-35 years ago at [facility] for otosclerosis with subsequent sensorineural hearing loss, who presents for his annual follow up. His audiogram demonstrated stable, normal hearing (sloping to severe SNHL) with stable and thresholds and word recognition. Impacted cerumen was removed, and he was encouraged to use ear drops for 5 days prior to his next visit to soften the crust.     Follow-up: 1-year with updated audio        Scribe Disclosure:   I, Farida Lozano, am serving as a scribe; to document services personally performed by Sonja Thornton MD -based on data collection and the provider's statements to me.     Provider Disclosure:  I agree with above History, Review of Systems, Physical exam and Plan.  I have reviewed the content of the documentation and have edited it as needed. I have personally performed the services documented here and the documentation accurately represents those services and the decisions I have made.      Electronically signed by:  Sonja Thornton MD  Otology & Neurotology  [facility]

## 2024-05-14 ENCOUNTER — OFFICE VISIT (OUTPATIENT)
Dept: AUDIOLOGY | Facility: CLINIC | Age: 79
End: 2024-05-14
Payer: COMMERCIAL

## 2024-05-14 ENCOUNTER — OFFICE VISIT (OUTPATIENT)
Dept: OTOLARYNGOLOGY | Facility: CLINIC | Age: 79
End: 2024-05-14
Payer: COMMERCIAL

## 2024-05-14 VITALS
HEIGHT: 65 IN | OXYGEN SATURATION: 98 % | WEIGHT: 138 LBS | BODY MASS INDEX: 22.99 KG/M2 | HEART RATE: 73 BPM | TEMPERATURE: 98.3 F | SYSTOLIC BLOOD PRESSURE: 135 MMHG | DIASTOLIC BLOOD PRESSURE: 78 MMHG

## 2024-05-14 DIAGNOSIS — H90.3 ASYMMETRICAL SENSORINEURAL HEARING LOSS: ICD-10-CM

## 2024-05-14 DIAGNOSIS — H90.3 BILATERAL SENSORINEURAL HEARING LOSS: Primary | ICD-10-CM

## 2024-05-14 DIAGNOSIS — H90.3 SENSORINEURAL HEARING LOSS, BILATERAL: Primary | ICD-10-CM

## 2024-05-14 PROCEDURE — 92550 TYMPANOMETRY & REFLEX THRESH: CPT | Mod: 52 | Performed by: AUDIOLOGIST

## 2024-05-14 PROCEDURE — 92557 COMPREHENSIVE HEARING TEST: CPT | Mod: 52 | Performed by: AUDIOLOGIST

## 2024-05-14 PROCEDURE — 92504 EAR MICROSCOPY EXAMINATION: CPT | Performed by: OTOLARYNGOLOGY

## 2024-05-14 PROCEDURE — 99213 OFFICE O/P EST LOW 20 MIN: CPT | Mod: 25 | Performed by: OTOLARYNGOLOGY

## 2024-05-14 ASSESSMENT — PAIN SCALES - GENERAL: PAINLEVEL: NO PAIN (0)

## 2024-05-14 NOTE — NURSING NOTE
"Chief Complaint   Patient presents with    RECHECK     Yearly follow up     Blood pressure 135/78, pulse 73, temperature 98.3  F (36.8  C), height 1.651 m (5' 5\"), weight 62.6 kg (138 lb), SpO2 98%.  Ellis Villa LPN    "

## 2024-05-14 NOTE — PATIENT INSTRUCTIONS
You were seen in the ENT Clinic today by Dr. Thornton. If you have any questions or concerns after your appointment, please contact us (see below)      2.   Please return to clinic in one year with an audiogram prior.         How to Contact Us:  Send a [employer] message to your provider. Our team will respond to you via [employer]. Occasionally, we will need to call you to get further information.  For urgent matters (Monday-Friday), call the ENT Clinic: [phone] and speak with a call center team member - they will route your call appropriately.   If you'd like to speak directly with a nurse, please find our contact information below. We do our best to check voicemail frequently throughout the day, and will work to call you back within 1-2 days. For urgent matters, please use the general clinic phone numbers listed above.      Cassy GRIFFITHS RN  ENT RN Care Coordinator  Direct: [phone]    Kaylynn ROBLEDO LPN  Direct: [phone]

## 2024-05-14 NOTE — PROGRESS NOTES
AUDIOLOGY REPORT    SUMMARY: Audiology visit completed. See audiogram for results.      RECOMMENDATIONS: Follow-up with ENT.    Earnest Winters, [facility]  Licensed Audiologist  [place] #[id number]

## 2024-05-14 NOTE — LETTER
2024       RE: Bear Guido  [address] [facility] [address]     Dear Colleague,    Thank you for referring your patient, Bear Guido, to the [facility] EAR NOSE AND THROAT CLINIC [place] [facility]. Please see a copy of my visit note below.      Neurotology Clinic      Name: Bear Guido  MRN: [id number]  Age: 78 year old  : [birth year]24      Chief Complaint:   Follow up     History of Present Illness:   Bear Guido is a 78 year old male with a history of right ear surgeries about 30-35 years ago at [facility] for otosclerosis with subsequent sensorineural hearing loss, who presents for his annual follow up. He has a history of otosclerosis, and biopsies from previous exams demonstrate chronic inflammation. He wears a hearing aid in his left ear. A biopsy on a lesion from the right ear () revealed small fragments of benign respiratory type and squamous mucosa with marked acute and chronic inflammation with granulation tissue and focal bacterial clusters present on the surface of mucosa. In the interim, is is seen by Beena Everett for routine ear cleaning.      His previous visit was on 2023, he said that he still had some hearing on the right side at the very lowest frequencies, which he found frustrating because he only heard trucks and other similar sounds. He uses a CROS hearing aid in the car or other similar situations, but he does not find it helpful when he is out and about. His audiogram revealed stable hearing. Today, he says that he is doing well, and has not had many new issues in regards to his hearing. He uses a left hearing aid.     Review of Systems:   Pertinent items are noted in HPI or as in patient entered ROS below, remainder of complete ROS is negative.       2024     7:24 AM    ENT ROS   Musculoskeletal Sore or stiff joints   Allergy/Immunology Allergies or hay fever         Physical Exam:  "  /78   Pulse 73   Temp 98.3  F (36.8  C)   Ht 1.651 m (5' 5\")   Wt 62.6 kg (138 lb)   SpO2 98%   BMI 22.96 kg/m       Constitutional:  The patient was accompanied by his wife, well-groomed, and in no acute distress.     Skin: Normal:  warm and pink without rash   Neurologic: Alert and oriented x 3.  CN's III-XII within normal limits.  Voice normal.    Psychiatric: The patient's affect was calm, cooperative, and appropriate.     Communication:  Normal; communicates verbally, normal voice quality.   Respiratory: Breathing comfortably without stridor or exertion of accessory muscles.    Head/Face:  No lesions or scars. No sinus tenderness.     Eyes: Pupils were equal and reactive.  Extraocular movement intact.     Ears: Pinnae and tragus non-tender.         Otologic microscope exam:  Right ear: The posterior wall of the ear canal has some inflammatory tissue that was not present at the previous visit. I cleaned out the cerumen impaction and crusting. The floor of the ear canal that was excavated did not have inflammation tissue.   Left ear: Lined with healthy skin, no cerumen accumulation, normal TM    Audiogram: Most recent audiogram (05/14/2024).  AUDIOGRAM: He underwent an audiogram today. This demonstrated:  Results: Left: Normal hearing sloping to severe SNHL. Thresholds and word recognition are stable re: 5/12/2023. Right: DNT due to documented complete hearing loss. Tympanograms: WNL left. DNT right due to surgical hx. Reflexes: Left ipsi present at normal level and right contra absent. DNT right ipsi or left contra due to surgical hx.      Right: Speech reception threshold is DNT dB with DNT% word recognition. Tympanogram -- type   Left: Speech reception threshold is 25 dB with 96% word recognition. Tympanogram a type     Audiogram was independently reviewed    Imaging:  MR BRAIN W/O & W CONTRAST (12/2/2020)   ICD-10: Bilateral sensorineural hearing loss   Comparison: 10/7/2014 dated CT temporal    "                                                              Impression:    No findings to explain patient's sensorineural hearing loss.  Mild leukokraurosis. Mild diffuse cerebral volume loss.    Outside records were independently reviewed.      Pathology:  RIGHT EAR, BIOPSY 11/21/21:  Small fragments of benign respiratory type and squamous mucosa with marked acute and chronic inflammation with granulation tissue.  Focal bacterial clusters present on the surface of mucosa.     Pathology was independently reviewed.     Assessment and Plan:    Right profound sensorineural hearing loss from prior stapes surgery   Right cerumen impaction and canal excavation  Left high frequency sensorineural hearing loss     Bear Guido is a 78 year old male with a history of right ear surgeries about 30-35 years ago at [facility] for otosclerosis with subsequent sensorineural hearing loss, who presents for his annual follow up. His audiogram demonstrated stable, normal hearing (sloping to severe SNHL) with stable and thresholds and word recognition. Impacted cerumen was removed, and he was encouraged to use ear drops for 5 days prior to his next visit to soften the crust.     Follow-up: 1-year with updated audio        Scribe Disclosure:   I, Farida Lozano, am serving as a scribe; to document services personally performed by Sonja Thornton MD -based on data collection and the provider's statements to me.     Provider Disclosure:  I agree with above History, Review of Systems, Physical exam and Plan.  I have reviewed the content of the documentation and have edited it as needed. I have personally performed the services documented here and the documentation accurately represents those services and the decisions I have made.      Electronically signed by:  Sonja Thornton MD  Otology & Neurotology  [facility]

## 2024-06-18 DIAGNOSIS — E78.5 HYPERLIPIDEMIA WITH TARGET LDL LESS THAN 130: ICD-10-CM

## 2024-06-18 RX ORDER — ATORVASTATIN CALCIUM 10 MG/1
TABLET, FILM COATED ORAL
Qty: 90 TABLET | Refills: 1 | OUTPATIENT
Start: 2024-06-18

## 2024-07-09 ENCOUNTER — PATIENT OUTREACH (OUTPATIENT)
Dept: CARE COORDINATION | Facility: CLINIC | Age: 79
End: 2024-07-09
Payer: COMMERCIAL

## 2024-09-01 ENCOUNTER — MYC MEDICAL ADVICE (OUTPATIENT)
Dept: FAMILY MEDICINE | Facility: CLINIC | Age: 79
End: 2024-09-01
Payer: COMMERCIAL

## 2024-09-01 DIAGNOSIS — Z00.00 ROUTINE GENERAL MEDICAL EXAMINATION AT A HEALTH CARE FACILITY: ICD-10-CM

## 2024-09-01 DIAGNOSIS — Z13.6 CARDIOVASCULAR SCREENING; LDL GOAL LESS THAN 160: Primary | ICD-10-CM

## 2024-09-01 DIAGNOSIS — Z12.5 SCREENING PSA (PROSTATE SPECIFIC ANTIGEN): ICD-10-CM

## 2024-09-03 NOTE — TELEPHONE ENCOUNTER
JS,  Please see below test company message and advise.  Pended to match from last summer.  Thanks,  Kim BRENNAN RN

## 2024-09-06 ENCOUNTER — LAB (OUTPATIENT)
Dept: LAB | Facility: CLINIC | Age: 79
End: 2024-09-06
Payer: COMMERCIAL

## 2024-09-06 DIAGNOSIS — Z00.00 ROUTINE GENERAL MEDICAL EXAMINATION AT A HEALTH CARE FACILITY: ICD-10-CM

## 2024-09-06 DIAGNOSIS — Z13.6 CARDIOVASCULAR SCREENING; LDL GOAL LESS THAN 160: ICD-10-CM

## 2024-09-06 DIAGNOSIS — Z12.5 SCREENING PSA (PROSTATE SPECIFIC ANTIGEN): ICD-10-CM

## 2024-09-06 LAB
BASOPHILS # BLD AUTO: 0 10E3/UL (ref 0–0.2)
BASOPHILS NFR BLD AUTO: 1 %
EOSINOPHIL # BLD AUTO: 0.5 10E3/UL (ref 0–0.7)
EOSINOPHIL NFR BLD AUTO: 6 %
ERYTHROCYTE [DISTWIDTH] IN BLOOD BY AUTOMATED COUNT: 12.5 % (ref 10–15)
HCT VFR BLD AUTO: 40.8 % (ref 40–53)
HGB BLD-MCNC: 13.8 G/DL (ref 13.3–17.7)
IMM GRANULOCYTES # BLD: 0 10E3/UL
IMM GRANULOCYTES NFR BLD: 0 %
LYMPHOCYTES # BLD AUTO: 2 10E3/UL (ref 0.8–5.3)
LYMPHOCYTES NFR BLD AUTO: 25 %
MCH RBC QN AUTO: 30.1 PG (ref 26.5–33)
MCHC RBC AUTO-ENTMCNC: 33.8 G/DL (ref 31.5–36.5)
MCV RBC AUTO: 89 FL (ref 78–100)
MONOCYTES # BLD AUTO: 0.9 10E3/UL (ref 0–1.3)
MONOCYTES NFR BLD AUTO: 11 %
NEUTROPHILS # BLD AUTO: 4.6 10E3/UL (ref 1.6–8.3)
NEUTROPHILS NFR BLD AUTO: 58 %
PLATELET # BLD AUTO: 249 10E3/UL (ref 150–450)
RBC # BLD AUTO: 4.59 10E6/UL (ref 4.4–5.9)
WBC # BLD AUTO: 7.9 10E3/UL (ref 4–11)

## 2024-09-06 PROCEDURE — 80061 LIPID PANEL: CPT

## 2024-09-06 PROCEDURE — 85025 COMPLETE CBC W/AUTO DIFF WBC: CPT

## 2024-09-06 PROCEDURE — 80053 COMPREHEN METABOLIC PANEL: CPT

## 2024-09-06 PROCEDURE — G0103 PSA SCREENING: HCPCS

## 2024-09-06 PROCEDURE — 36415 COLL VENOUS BLD VENIPUNCTURE: CPT

## 2024-09-07 LAB
ALBUMIN SERPL BCG-MCNC: 3.9 G/DL (ref 3.5–5.2)
ALP SERPL-CCNC: 61 U/L (ref 40–150)
ALT SERPL W P-5'-P-CCNC: 18 U/L (ref 0–70)
ANION GAP SERPL CALCULATED.3IONS-SCNC: 11 MMOL/L (ref 7–15)
AST SERPL W P-5'-P-CCNC: 30 U/L (ref 0–45)
BILIRUB SERPL-MCNC: 0.5 MG/DL
BUN SERPL-MCNC: 21.8 MG/DL (ref 8–23)
CALCIUM SERPL-MCNC: 8.9 MG/DL (ref 8.8–10.4)
CHLORIDE SERPL-SCNC: 102 MMOL/L (ref 98–107)
CHOLEST SERPL-MCNC: 164 MG/DL
CREAT SERPL-MCNC: 0.93 MG/DL (ref 0.67–1.17)
EGFRCR SERPLBLD CKD-EPI 2021: 84 ML/MIN/1.73M2
FASTING STATUS PATIENT QL REPORTED: YES
FASTING STATUS PATIENT QL REPORTED: YES
GLUCOSE SERPL-MCNC: 102 MG/DL (ref 70–99)
HCO3 SERPL-SCNC: 22 MMOL/L (ref 22–29)
HDLC SERPL-MCNC: 57 MG/DL
LDLC SERPL CALC-MCNC: 88 MG/DL
NONHDLC SERPL-MCNC: 107 MG/DL
POTASSIUM SERPL-SCNC: 4.4 MMOL/L (ref 3.4–5.3)
PROT SERPL-MCNC: 6.6 G/DL (ref 6.4–8.3)
PSA SERPL DL<=0.01 NG/ML-MCNC: 0.86 NG/ML (ref 0–6.5)
SODIUM SERPL-SCNC: 135 MMOL/L (ref 135–145)
TRIGL SERPL-MCNC: 93 MG/DL

## 2024-09-09 ENCOUNTER — OFFICE VISIT (OUTPATIENT)
Dept: FAMILY MEDICINE | Facility: CLINIC | Age: 79
End: 2024-09-09
Attending: PHYSICIAN ASSISTANT
Payer: COMMERCIAL

## 2024-09-09 VITALS
WEIGHT: 132.4 LBS | HEIGHT: 65 IN | HEART RATE: 69 BPM | TEMPERATURE: 97.5 F | SYSTOLIC BLOOD PRESSURE: 118 MMHG | OXYGEN SATURATION: 95 % | BODY MASS INDEX: 22.06 KG/M2 | DIASTOLIC BLOOD PRESSURE: 62 MMHG | RESPIRATION RATE: 16 BRPM

## 2024-09-09 DIAGNOSIS — F51.02 TRANSIENT INSOMNIA: ICD-10-CM

## 2024-09-09 DIAGNOSIS — Z00.00 ENCOUNTER FOR MEDICARE ANNUAL WELLNESS EXAM: Primary | ICD-10-CM

## 2024-09-09 DIAGNOSIS — J45.40 MODERATE PERSISTENT ASTHMA WITHOUT COMPLICATION: ICD-10-CM

## 2024-09-09 PROCEDURE — 90662 IIV NO PRSV INCREASED AG IM: CPT | Performed by: PHYSICIAN ASSISTANT

## 2024-09-09 PROCEDURE — G0439 PPPS, SUBSEQ VISIT: HCPCS | Performed by: PHYSICIAN ASSISTANT

## 2024-09-09 PROCEDURE — G0008 ADMIN INFLUENZA VIRUS VAC: HCPCS | Performed by: PHYSICIAN ASSISTANT

## 2024-09-09 RX ORDER — FLUTICASONE FUROATE AND VILANTEROL 100; 25 UG/1; UG/1
1 POWDER RESPIRATORY (INHALATION) DAILY
Qty: 3 EACH | Refills: 3 | Status: SHIPPED | OUTPATIENT
Start: 2024-09-09

## 2024-09-09 RX ORDER — ZOLPIDEM TARTRATE 5 MG/1
5 TABLET ORAL
Qty: 15 TABLET | Refills: 0 | Status: SHIPPED | OUTPATIENT
Start: 2024-09-09

## 2024-09-09 ASSESSMENT — ASTHMA QUESTIONNAIRES
QUESTION_4 LAST FOUR WEEKS HOW OFTEN HAVE YOU USED YOUR RESCUE INHALER OR NEBULIZER MEDICATION (SUCH AS ALBUTEROL): NOT AT ALL
QUESTION_5 LAST FOUR WEEKS HOW WOULD YOU RATE YOUR ASTHMA CONTROL: WELL CONTROLLED
ACT_TOTALSCORE: 24
QUESTION_1 LAST FOUR WEEKS HOW MUCH OF THE TIME DID YOUR ASTHMA KEEP YOU FROM GETTING AS MUCH DONE AT WORK, SCHOOL OR AT HOME: NONE OF THE TIME
QUESTION_3 LAST FOUR WEEKS HOW OFTEN DID YOUR ASTHMA SYMPTOMS (WHEEZING, COUGHING, SHORTNESS OF BREATH, CHEST TIGHTNESS OR PAIN) WAKE YOU UP AT NIGHT OR EARLIER THAN USUAL IN THE MORNING: NOT AT ALL
QUESTION_2 LAST FOUR WEEKS HOW OFTEN HAVE YOU HAD SHORTNESS OF BREATH: NOT AT ALL
ACT_TOTALSCORE: 24

## 2024-09-09 ASSESSMENT — PAIN SCALES - GENERAL: PAINLEVEL: NO PAIN (0)

## 2024-09-09 NOTE — LETTER
My Asthma Action Plan    Name: Bear Guido   YOB: 1945  Date: 9/9/2024   My doctor: Norm Tenorio PA-C   My clinic: [facility]        My Control Medicine: Fluticasone furoate + vilanterol (Breo Ellipta)  -  200/25mcg    My Rescue Medicine: Albuterol (Proair/Ventolin/Proventil HFA) 2-4 puffs EVERY 4 HOURS as needed. Use a spacer if recommended by your provider.   My Asthma Severity:   Moderate Persistent  Know your asthma triggers:   pollens            GREEN ZONE   Good Control  I feel good  No cough or wheeze  Can work, sleep and play without asthma symptoms       Take your asthma control medicine every day.     If exercise triggers your asthma, take your rescue medication  15 minutes before exercise or sports, and  During exercise if you have asthma symptoms  Spacer to use with inhaler: If you have a spacer, make sure to use it with your inhaler             YELLOW ZONE Getting Worse  I have ANY of these:  I do not feel good  Cough or wheeze  Chest feels tight  Wake up at night   Keep taking your Green Zone medications  Start taking your rescue medicine:  every 20 minutes for up to 1 hour. Then every 4 hours for 24-48 hours.  If you stay in the Yellow Zone for more than 12-24 hours, contact your doctor.  If you do not return to the Green Zone in 12-24 hours or you get worse, start taking your oral steroid medicine if prescribed by your provider.           RED ZONE Medical Alert - Get Help  I have ANY of these:  I feel awful  Medicine is not helping  Breathing getting harder  Trouble walking or talking  Nose opens wide to breathe       Take your rescue medicine NOW  If your provider has prescribed an oral steroid medicine, start taking it NOW  Call your doctor NOW  If you are still in the Red Zone after 20 minutes and you have not reached your doctor:  Take your rescue medicine again and  Call 911 or go to the emergency room right away    See your regular doctor within 2  weeks of an Emergency Room or Urgent Care visit for follow-up treatment.          Annual Reminders:  Meet with Asthma Educator,  Flu Shot in the Fall, consider Pneumonia Vaccination for patients with asthma (aged 19 and older).    Pharmacy: [id number] - [place], [place] - [address] AT [address] [place] [address]    Electronically signed by Norm Tenorio PA-C   Date: 09/09/24                      Asthma Triggers  How To Control Things That Make Your Asthma Worse    Triggers are things that make your asthma worse.  Look at the list below to help you find your triggers and what you can do about them.  You can help prevent asthma flare-ups by staying away from your triggers.      Trigger                                                          What you can do   Cigarette Smoke  Tobacco smoke can make asthma worse. Do not allow smoking in your home, car or around you.  Be sure no one smokes at a child s day care or school.  If you smoke, ask your health care provider for ways to help you quit.  Ask family members to quit too.  Ask your health care provider for a referral to Quit Plan to help you quit smoking, or call 7-453-675-PLAN.     Colds, Flu, Bronchitis  These are common triggers of asthma. Wash your hands often.  Don t touch your eyes, nose or mouth.  Get a flu shot every year.     Dust Mites  These are tiny bugs that live in cloth or carpet. They are too small to see. Wash sheets and blankets in hot water every week.   Encase pillows and mattress in dust mite proof covers.  Avoid having carpet if you can. If you have carpet, vacuum weekly.   Use a dust mask and HEPA vacuum.   Pollen and Outdoor Mold  Some people are allergic to trees, grass, or weed pollen, or molds. Try to keep your windows closed.  Limit time out doors when pollen count is high.   Ask you health care provider about taking medicine during allergy season.     Animal Dander  Some people are allergic to skin flakes,  urine or saliva from pets with fur or feathers. Keep pets with fur or feathers out of your home.    If you can t keep the pet outdoors, then keep the pet out of your bedroom.  Keep the bedroom door closed.  Keep pets off cloth furniture and away from stuffed toys.     Mice, Rats, and Cockroaches   Some people are allergic to the waste from these pests.   Cover food and garbage.  Clean up spills and food crumbs.  Store grease in the refrigerator.   Keep food out of the bedroom.   Indoor Mold  This can be a trigger if your home has high moisture. Fix leaking faucets, pipes, or other sources of water.   Clean moldy surfaces.  Dehumidify basement if it is damp and smelly.   Smoke, Strong Odors, and Sprays  These can reduce air quality. Stay away from strong odors and sprays, such as perfume, powder, hair spray, paints, smoke incense, paint, cleaning products, candles and new carpet.   Exercise or Sports  Some people with asthma have this trigger. Be active!  Ask your doctor about taking medicine before sports or exercise to prevent symptoms.    Warm up for 5-10 minutes before and after sports or exercise.     Other Triggers of Asthma  Cold air:  Cover your nose and mouth with a scarf.  Sometimes laughing or crying can be a trigger.  Some medicines and food can trigger asthma.

## 2024-09-09 NOTE — PATIENT INSTRUCTIONS
Patient Education   Preventive Care Advice   This is general advice given by our system to help you stay healthy. However, your care team may have specific advice just for you. Please talk to your care team about your preventive care needs.  Nutrition  Eat 5 or more servings of fruits and vegetables each day.  Try wheat bread, brown rice and whole grain pasta (instead of white bread, rice, and pasta).  Get enough calcium and vitamin D. Check the label on foods and aim for 100% of the RDA (recommended daily allowance).  Lifestyle  Exercise at least 150 minutes each week  (30 minutes a day, 5 days a week).  Do muscle strengthening activities 2 days a week. These help control your weight and prevent disease.  No smoking.  Wear sunscreen to prevent skin cancer.  Have a dental exam and cleaning every 6 months.  Yearly exams  See your health care team every year to talk about:  Any changes in your health.  Any medicines your care team has prescribed.  Preventive care, family planning, and ways to prevent chronic diseases.  Shots (vaccines)   HPV shots (up to age 26), if you've never had them before.  Hepatitis B shots (up to age 59), if you've never had them before.  COVID-19 shot: Get this shot when it's due.  Flu shot: Get a flu shot every year.  Tetanus shot: Get a tetanus shot every 10 years.  Pneumococcal, hepatitis A, and RSV shots: Ask your care team if you need these based on your risk.  Shingles shot (for age 50 and up)  General health tests  Diabetes screening:  Starting at age 35, Get screened for diabetes at least every 3 years.  If you are younger than age 35, ask your care team if you should be screened for diabetes.  Cholesterol test: At age 39, start having a cholesterol test every 5 years, or more often if advised.  Bone density scan (DEXA): At age 50, ask your care team if you should have this scan for osteoporosis (brittle bones).  Hepatitis C: Get tested at least once in your life.  STIs (sexually  transmitted infections)  Before age 24: Ask your care team if you should be screened for STIs.  After age 24: Get screened for STIs if you're at risk. You are at risk for STIs (including HIV) if:  You are sexually active with more than one person.  You don't use condoms every time.  You or a partner was diagnosed with a sexually transmitted infection.  If you are at risk for HIV, ask about PrEP medicine to prevent HIV.  Get tested for HIV at least once in your life, whether you are at risk for HIV or not.  Cancer screening tests  Cervical cancer screening: If you have a cervix, begin getting regular cervical cancer screening tests starting at age 21.  Breast cancer scan (mammogram): If you've ever had breasts, begin having regular mammograms starting at age 40. This is a scan to check for breast cancer.  Colon cancer screening: It is important to start screening for colon cancer at age 45.  Have a colonoscopy test every 10 years (or more often if you're at risk) Or, ask your provider about stool tests like a FIT test every year or Cologuard test every 3 years.  To learn more about your testing options, visit:   .  For help making a decision, visit:   https://bit.ly/py19035.  Prostate cancer screening test: If you have a prostate, ask your care team if a prostate cancer screening test (PSA) at age 55 is right for you.  Lung cancer screening: If you are a current or former smoker ages 50 to 80, ask your care team if ongoing lung cancer screenings are right for you.  For informational purposes only. Not to replace the advice of your health care provider. Copyright   2023 [facility] All rights reserved. Clinically reviewed by the [facility]. [employer] [id number] - REV 01/24.  Hearing Loss: Care Instructions  Overview     Hearing loss is a sudden or slow decrease in how well you hear. It can range from slight to profound. Permanent hearing loss can occur with aging. It also can  happen when you are exposed long-term to loud noise. Examples include listening to loud music, riding motorcycles, or being around other loud machines.  Hearing loss can affect your work and home life. It can make you feel lonely or depressed. You may feel that you have lost your independence. But hearing aids and other devices can help you hear better and feel connected to others.  Follow-up care is a key part of your treatment and safety. Be sure to make and go to all appointments, and call your doctor if you are having problems. It's also a good idea to know your test results and keep a list of the medicines you take.  How can you care for yourself at home?  Avoid loud noises whenever possible. This helps keep your hearing from getting worse.  Always wear hearing protection around loud noises.  Wear a hearing aid as directed.  A professional can help you pick a hearing aid that will work best for you.  You can also get hearing aids over the counter for mild to moderate hearing loss.  Have hearing tests as your doctor suggests. They can show whether your hearing has changed. Your hearing aid may need to be adjusted.  Use other devices as needed. These may include:  Telephone amplifiers and hearing aids that can connect to a television, stereo, radio, or microphone.  Devices that use lights or vibrations. These alert you to the doorbell, a ringing telephone, or a baby monitor.  Television closed-captioning. This shows the words at the bottom of the screen. Most new TVs can do this.  TTY (text telephone). This lets you type messages back and forth on the telephone instead of talking or listening. These devices are also called TDD. When messages are typed on the keyboard, they are sent over the phone line to a receiving TTY. The message is shown on a monitor.  Use text messaging, social media, and email if it is hard for you to communicate by telephone.  Try to learn a listening technique called speechreading. It is  "not lipreading. You pay attention to people's gestures, expressions, posture, and tone of voice. These clues can help you understand what a person is saying. Face the person you are talking to, and have them face you. Make sure the lighting is good. You need to see the other person's face clearly.  Think about counseling if you need help to adjust to your hearing loss.  When should you call for help?  Watch closely for changes in your health, and be sure to contact your doctor if:    You think your hearing is getting worse.     You have new symptoms, such as dizziness or nausea.   Where can you learn more?  Go to https://www."[employer]net/patiented  Enter R798 in the search box to learn more about \"Hearing Loss: Care Instructions.\"  Current as of: September 27, 2023               Content Version: 14.0    8558-2720 [employer].   Care instructions adapted under license by your healthcare professional. If you have questions about a medical condition or this instruction, always ask your healthcare professional. [employer] disclaims any warranty or liability for your use of this information.      Bladder Training: Care Instructions  Your Care Instructions     Bladder training is used to treat urge incontinence and stress incontinence. Urge incontinence means that the need to urinate comes on so fast that you can't get to a toilet in time. Stress incontinence means that you leak urine because of pressure on your bladder. For example, it may happen when you laugh, cough, or lift something heavy.  Bladder training can increase how long you can wait before you have to urinate. It can also help your bladder hold more urine. And it can give you better control over the urge to urinate.  It is important to remember that bladder training takes a few weeks to a few months to make a difference. You may not see results right away, but don't give up.  Follow-up care is a key part of your treatment and " safety. Be sure to make and go to all appointments, and call your doctor if you are having problems. It's also a good idea to know your test results and keep a list of the medicines you take.  How can you care for yourself at home?  Work with your doctor to come up with a bladder training program that is right for you. You may use one or more of the following methods.  Delayed urination  In the beginning, try to keep from urinating for 5 minutes after you first feel the need to go.  While you wait, take deep, slow breaths to relax. Kegel exercises can also help you delay the need to go to the bathroom.  After some practice, when you can easily wait 5 minutes to urinate, try to wait 10 minutes before you urinate.  Slowly increase the waiting period until you are able to control when you have to urinate.  Scheduled urination  Empty your bladder when you first wake up in the morning.  Schedule times throughout the day when you will urinate.  Start by going to the bathroom every hour, even if you don't need to go.  Slowly increase the time between trips to the bathroom.  When you have found a schedule that works well for you, keep doing it.  If you wake up during the night and have to urinate, do it. Apply your schedule to waking hours only.  Kegel exercises  These tighten and strengthen pelvic muscles, which can help you control the flow of urine. (If doing these exercises causes pain, stop doing them and talk with your doctor.) To do Kegel exercises:  Squeeze your muscles as if you were trying not to pass gas. Or squeeze your muscles as if you were stopping the flow of urine. Your belly, legs, and buttocks shouldn't move.  Hold the squeeze for 3 seconds, then relax for 5 to 10 seconds.  Start with 3 seconds, then add 1 second each week until you are able to squeeze for 10 seconds.  Repeat the exercise 10 times a session. Do 3 to 8 sessions a day.  When should you call for help?  Watch closely for changes in your  "health, and be sure to contact your doctor if:    Your incontinence is getting worse.     You do not get better as expected.   Where can you learn more?  Go to https://www.[employer]net/patiented  Enter V684 in the search box to learn more about \"Bladder Training: Care Instructions.\"  Current as of: November 15, 2023               Content Version: 14.0    6537-0234 [employer].   Care instructions adapted under license by your healthcare professional. If you have questions about a medical condition or this instruction, always ask your healthcare professional. [employer] disclaims any warranty or liability for your use of this information.         "

## 2024-09-09 NOTE — PROGRESS NOTES
Preventive Care Visit  [facility]  Norm Tenorio PA-C, Family Medicine  Sep 9, 2024      Assessment & Plan     Encounter for Medicare annual wellness exam  Doing well, up to date on labs/screening    Moderate persistent asthma without complication    - fluticasone-vilanterol (BREO ELLIPTA) 100-25 MCG/ACT inhaler; Inhale 1 puff into the lungs daily.    Transient insomnia  Rare use, mostly with travel  - zolpidem (AMBIEN) 5 MG tablet; Take 1 tablet (5 mg) by mouth nightly as needed for sleep.            Counseling  Appropriate preventive services were addressed with this patient via screening, questionnaire, or discussion as appropriate for fall prevention, nutrition, physical activity, Tobacco-use cessation, social engagement, weight loss and cognition.  Checklist reviewing preventive services available has been given to the patient.  Reviewed patient's diet, addressing concerns and/or questions.   He is at risk for psychosocial distress and has been provided with information to reduce risk.   The patient was provided with written information regarding signs of hearing loss.   Information on urinary incontinence and treatment options given to patient.           Sascha Sifuentes is a 79 year old, presenting for the following:  Physical (AWV)      Health Care Directive  Patient does not have a Health Care Directive or Living Will: Discussed advance care planning with patient; information given to patient to review.    [facility]    Wellness Visit Notes:     -Colon Cancer Screening: Last done via colonoscopy on 10/10/2017. (Impression: normal; no specimens collected). Colon screening discontinued.  -PSA: Last done 9/06/2024 (tumor marker result: 0.86 ng/mL).   -Dermatology: Pt verbalized they do not meet with dermatology regularly. .  -Immunizations: Patient is due/able to receive at clinic today: Flu HD ordered/given.   Asthma Action Plan completed. Sent to patient via [employer].          Exceptionally healthy 78 y/o male here for well exam.  Zero concerns.  Would like to take lower Breo dose in winter, asthma under excellent control.  Still very active, rides bikes several days a week along with strength training.  Rides his age in miles every year, and actually rode over 80.  No cardiovascular issues.        9/9/2024   General Health   How would you rate your overall physical health? Excellent   Feel stress (tense, anxious, or unable to sleep) Only a little      (!) STRESS CONCERN      9/9/2024   Nutrition   Diet: Other   If other, please elaborate: no cilantro!            9/9/2024   Exercise   Days per week of moderate/strenous exercise 6 days            9/9/2024   Social Factors   Frequency of gathering with friends or relatives More than three times a week   Worry food won't last until get money to buy more No   Food not last or not have enough money for food? No   Do you have housing? (Housing is defined as stable permanent housing and does not include staying ouside in a car, in a tent, in an abandoned building, in an overnight shelter, or couch-surfing.) Yes   Are you worried about losing your housing? No   Lack of transportation? No   Unable to get utilities (heat,electricity)? No            9/9/2024   Fall Risk   Fallen 2 or more times in the past year? No    No   Trouble with walking or balance? No    No       Multiple values from one day are sorted in reverse-chronological order          9/9/2024   Activities of Daily Living- Home Safety   Needs help with the following daily activites None of the above   Safety concerns in the home None of the above            9/9/2024   Dental   Dentist two times every year? Yes            9/9/2024   Hearing Screening   Hearing concerns? (!) I NEED TO ASK PEOPLE TO SPEAK UP OR REPEAT THEMSELVES.    (!) IT'S HARDER TO UNDERSTAND WOMEN'S VOICES THAN MEN'S VOICES.    (!) IT'S HARD TO FOLLOW A CONVERSATION IN A NOISY RESTAURANT OR CROWDED ROOM.        Multiple values from one day are sorted in reverse-chronological order         9/9/2024   Driving Risk Screening   Patient/family members have concerns about driving No            9/9/2024   General Alertness/Fatigue Screening   Have you been more tired than usual lately? No            9/9/2024   Urinary Incontinence Screening   Bothered by leaking urine in past 6 months Yes            9/9/2024   TB Screening   Were you born outside of the [place]? No            Today's PHQ-2 Score:       9/9/2024    10:49 AM   PHQ-2 ( 1999 Pf[employer])   Q1: Little interest or pleasure in doing things 0   Q2: Feeling down, depressed or hopeless 0   PHQ-2 Score 0   Q1: Little interest or pleasure in doing things Not at all   Q2: Feeling down, depressed or hopeless Not at all   PHQ-2 Score 0           9/9/2024   Substance Use   Alcohol more than 3/day or more than 7/wk No   Do you have a current opioid prescription? No   How severe/bad is pain from 1 to 10? 1/10   Do you use any other substances recreationally? No        Social History     Tobacco Use    Smoking status: Never    Smokeless tobacco: Never   Vaping Use    Vaping status: Never Used   Substance Use Topics    Alcohol use: Yes     Alcohol/week: 0.0 standard drinks of alcohol     Comment: 1 - 2 drinks daily in summer, less in winter    Drug use: No       ASCVD Risk   The 10-year ASCVD risk score (Olesya MO, et al., 2019) is: 24.2%    Values used to calculate the score:      Age: 79 years      Sex: Male      Is Non- : No      Diabetic: No      Tobacco smoker: No      Systolic Blood Pressure: 118 mmHg      Is BP treated: No      HDL Cholesterol: 57 mg/dL      Total Cholesterol: 164 mg/dL            Reviewed and updated as needed this visit by Provider                    BP Readings from Last 3 Encounters:   09/09/24 118/62   05/14/24 135/78   11/14/23 (!) 149/83    Wt Readings from Last 3 Encounters:   09/09/24 60.1 kg (132 lb 6.4 oz)   05/14/24 62.6 kg  "(138 lb)   11/14/23 61.7 kg (136 lb)                  Current providers sharing in care for this patient include:  Patient Care Team:  Norm Tenorio PA-C as PCP - General (Family Medicine)  Hortensia Shah AuD as Audiologist (Audiology)  Sonja Thornton MD as MD (Otolaryngology)  Norm Tenorio PA-C as Assigned PCP  Sonja Thornton MD as Assigned Surgical Provider    The following health maintenance items are reviewed in Epic and correct as of today:  Health Maintenance   Topic Date Due    INFLUENZA VACCINE (1) 09/01/2024    COVID-19 Vaccine (7 - 2023-24 season) 09/16/2024 (Originally 9/1/2024)    ASTHMA CONTROL TEST  03/09/2025    LIPID  09/06/2025    MEDICARE ANNUAL WELLNESS VISIT  09/09/2025    ANNUAL REVIEW OF HM ORDERS  09/09/2025    ASTHMA ACTION PLAN  09/09/2025    FALL RISK ASSESSMENT  09/09/2025    DTAP/TDAP/TD IMMUNIZATION (3 - Td or Tdap) 01/06/2026    GLUCOSE  09/06/2027    COLORECTAL CANCER SCREENING  10/10/2027    ADVANCE CARE PLANNING  09/09/2029    HEPATITIS C SCREENING  Completed    PHQ-2 (once per calendar year)  Completed    Pneumococcal Vaccine: 65+ Years  Completed    ZOSTER IMMUNIZATION  Completed    HPV IMMUNIZATION  Aged Out    MENINGITIS IMMUNIZATION  Aged Out    RSV MONOCLONAL ANTIBODY  Aged Out         Review of Systems  Constitutional, HEENT, cardiovascular, pulmonary, gi and gu systems are negative, except as otherwise noted.     Objective    Exam  /62   Pulse 69   Temp 97.5  F (36.4  C) (Temporal)   Resp 16   Ht 1.651 m (5' 5\")   Wt 60.1 kg (132 lb 6.4 oz)   SpO2 95%   BMI 22.03 kg/m     Estimated body mass index is 22.03 kg/m  as calculated from the following:    Height as of this encounter: 1.651 m (5' 5\").    Weight as of this encounter: 60.1 kg (132 lb 6.4 oz).    Physical Exam  GENERAL: alert and no distress  EYES: Eyes grossly normal to inspection, PERRL and conjunctivae and sclerae normal  HENT: ear canals and TM's normal, nose and " mouth without ulcers or lesions  NECK: no adenopathy, no asymmetry, masses, or scars  RESP: lungs clear to auscultation - no rales, rhonchi or wheezes  CV: regular rate and rhythm, normal S1 S2, no S3 or S4, no murmur, click or rub, no peripheral edema  ABDOMEN: soft, nontender, no hepatosplenomegaly, no masses and bowel sounds normal  MS: no gross musculoskeletal defects noted, no edema  SKIN: no suspicious lesions or rashes  NEURO: Normal strength and tone, mentation intact and speech normal  PSYCH: mentation appears normal, affect normal/bright         9/9/2024   Mini Cog   Clock Draw Score 2 Normal   3 Item Recall 3 objects recalled   Mini Cog Total Score 5                 Signed Electronically by: Norm Tenorio PA-C

## 2024-09-18 ENCOUNTER — MYC MEDICAL ADVICE (OUTPATIENT)
Dept: FAMILY MEDICINE | Facility: CLINIC | Age: 79
End: 2024-09-18
Payer: COMMERCIAL

## 2024-09-18 DIAGNOSIS — K42.9 UMBILICAL HERNIA WITHOUT OBSTRUCTION AND WITHOUT GANGRENE: Primary | ICD-10-CM

## 2024-09-18 NOTE — TELEPHONE ENCOUNTER
Dre Hong,    Patient needs hernia referral. I pended if you can sign please.     Thanks    Melania Mercedes, DIEGO

## 2024-10-02 ENCOUNTER — TELEPHONE (OUTPATIENT)
Dept: SURGERY | Facility: CLINIC | Age: 79
End: 2024-10-02

## 2024-10-02 ENCOUNTER — OFFICE VISIT (OUTPATIENT)
Dept: SURGERY | Facility: CLINIC | Age: 79
End: 2024-10-02
Payer: COMMERCIAL

## 2024-10-02 VITALS
DIASTOLIC BLOOD PRESSURE: 60 MMHG | HEIGHT: 65 IN | BODY MASS INDEX: 21.99 KG/M2 | OXYGEN SATURATION: 98 % | WEIGHT: 132 LBS | HEART RATE: 83 BPM | SYSTOLIC BLOOD PRESSURE: 110 MMHG

## 2024-10-02 DIAGNOSIS — K42.9 UMBILICAL HERNIA WITHOUT OBSTRUCTION AND WITHOUT GANGRENE: ICD-10-CM

## 2024-10-02 PROCEDURE — 99213 OFFICE O/P EST LOW 20 MIN: CPT | Performed by: SURGERY

## 2024-10-02 RX ORDER — SIMVASTATIN 20 MG
20 TABLET ORAL AT BEDTIME
COMMUNITY

## 2024-10-02 RX ORDER — HEPARIN SODIUM 10000 [USP'U]/ML
5000 INJECTION, SOLUTION INTRAVENOUS; SUBCUTANEOUS
OUTPATIENT
Start: 2024-10-02

## 2024-10-02 NOTE — TELEPHONE ENCOUNTER
Type of surgery: Robotic assisted umbilical hernia repair  Location of surgery: [facility]  Date and time of surgery: 11/19/24 at 12pm  Surgeon: Dr. Alberto Bain  Pre-Op Appt Date: patient to schedule  Post-Op Appt Date: patient to schedule   Packet sent out: Yes  Pre-cert/Authorization completed:  Not Applicable  Date: 10/2/24

## 2024-10-02 NOTE — Clinical Note
"Thanks for sending him back to us.  Will get it all fixed up for him.  Give him an \"inney\" rather than the \"outy\" that he has now. "

## 2024-10-02 NOTE — LETTER
"October 4, 2024          Norm Tenorio PA-C  [address]  [place] [address]      RE:   Bear Guido 1945      Dear Colleague,    Thank you for referring your patient, Bear Guido, to [facility] [place]. Please see a copy of my visit note below.     Bear Guido is a 79 year old male who presented with a bulge near the umbilicus. The bulge comes and goes but has gotten larger over time. It is uncomfortable when the patient is engaging in exertional activity.  He has had it for sometime and has contacted this practice in the past for this.  Back then it wasn't hurting as bad as it is now.   The patient is here to discuss possible surgical repair of the umbilical hernia.     PMH:  Bear Guido  has a past medical history of Deafness in right ear (2002), HL (hearing loss), Palpitations, Ringing in ears, SVT (supraventricular tachycardia) (H), Tachycardia, and Uncomplicated asthma.  PSH:  Bear Guido  has a past surgical history that includes ENT surgery (1979) and Colonoscopy (N/A, 10/10/2017).     Home medications and allergies reviewed.     Social History:  Bear Guido  reports that he has never smoked. He has never used smokeless tobacco. He reports current alcohol use. He reports that he does not use drugs.  Family History:  Bear Guido family history includes Allergies in his mother; Asthma in his father; Cancer (age of onset: 62) in his father; Cerebrovascular Disease in his maternal grandfather; Coronary Artery Disease in his paternal grandfather and paternal grandmother; Heart Disease in his maternal grandfather, paternal grandfather, and paternal grandmother; High cholesterol in his father and mother; Hyperlipidemia in his father and mother.     ROS:  The 10 point Review of Systems is negative other than noted in the HPI.     Physical Exam:  Blood pressure 110/60, pulse 83, height 1.651 m (5' 5\"), weight 59.9 kg (132 lb), SpO2 98%.  132 " lbs 0 oz  In no acute distress  Pupils equal round and reactive to light.   No cervical lymphadenopathy or thyromegaly.   Lung fields clear, breathing comfortably.   Heart normal sinus rhythm.  No murmurs rubs or gallops.  Abdomen soft, nontender, nondistended, Umbilical hernia 3 cm, easily reducible.           Assessment/plan:  Bear Guido is a 79 year old male with a moderate sized umbilical hernia.  1.  I discussed the pathophysiology of umbilical hernias with the patient.   I discussed that if left be they can slowly grow over time.  The bigger they are, the more complicated the repair, and the longer the recovery would be.  I went over signs and symptoms of bowel incarceration and strangulation.  I told them that it is rare for that to happen, but if it does, it is an emergency and they should go to the emergency room.  I discussed surgical repair would prevent that.  They would like to proceed with repair.  2.  I discussed the different options for repair.  I discussed open, laparoscopic and robotic umbilical hernia repair.  In my hands robotic umbilical hernia repair offers the fastest recovery due to less pain and the lowest risk of short term and long term complications.  Also, with the robot I would be able to do a pre-peritoneal repair, protecting his bowel from the mesh I place.  3.  I discussed the pre-operative, aditi-operative and post-operative course of robotic umbilical hernia repair.   I discussed avoiding lifting over 20 pounds for two weeks after surgery.  I discussed potential risks, which include, but are not limited to, bleeding, infection, anesthetic risk, prolonged swelling, recurrence, chronic pain, conversion to open.  He agrees to proceed.  4.  He will require a pre-operative H&P, he will contact his primary care provider to schedule one prior to surgery. He will be schedule for a robotic umbilical hernia repair when we can find a mutually agreeable time.        Again, thank you for  allowing me to participate in the care of your patient.      Sincerely,      Alberto Bain MD

## 2024-10-04 NOTE — PROGRESS NOTES
"Surgery Consultation, [facility]         Alberto Bain MD    Bear Guido MRN# [id number]   YOB: 1945 Age: 79 year old     PCP:  Norm Tenorio [phone]    Chief Complaint:  Umbilical hernia    History of Present Illness:  Bear Guido is a 79 year old male who presented with a bulge near the umbilicus. The bulge comes and goes but has gotten larger over time. It is uncomfortable when the patient is engaging in exertional activity.  He has had it for sometime and has contacted this practice in the past for this.  Back then it wasn't hurting as bad as it is now.   The patient is here to discuss possible surgical repair of the umbilical hernia.    PMH:  Bear Guido  has a past medical history of Deafness in right ear (2002), HL (hearing loss), Palpitations, Ringing in ears, SVT (supraventricular tachycardia) (H), Tachycardia, and Uncomplicated asthma.  PSH:  Bear Guido  has a past surgical history that includes ENT surgery (1979) and Colonoscopy (N/A, 10/10/2017).    Home medications and allergies reviewed.    Social History:  Bear Guido  reports that he has never smoked. He has never used smokeless tobacco. He reports current alcohol use. He reports that he does not use drugs.  Family History:  Bear Guido family history includes Allergies in his mother; Asthma in his father; Cancer (age of onset: 62) in his father; Cerebrovascular Disease in his maternal grandfather; Coronary Artery Disease in his paternal grandfather and paternal grandmother; Heart Disease in his maternal grandfather, paternal grandfather, and paternal grandmother; High cholesterol in his father and mother; Hyperlipidemia in his father and mother.    ROS:  The 10 point Review of Systems is negative other than noted in the HPI.    Physical Exam:  Blood pressure 110/60, pulse 83, height 1.651 m (5' 5\"), weight 59.9 kg (132 lb), SpO2 98%.  132 lbs 0 oz  In no acute distress  Pupils equal round and reactive " to light.   No cervical lymphadenopathy or thyromegaly.   Lung fields clear, breathing comfortably.   Heart normal sinus rhythm.  No murmurs rubs or gallops.  Abdomen soft, nontender, nondistended, Umbilical hernia 3 cm, easily reducible.         Assessment/plan:  Bear Guido is a 79 year old male with a moderate sized umbilical hernia.  1.  I discussed the pathophysiology of umbilical hernias with the patient.   I discussed that if left be they can slowly grow over time.  The bigger they are, the more complicated the repair, and the longer the recovery would be.  I went over signs and symptoms of bowel incarceration and strangulation.  I told them that it is rare for that to happen, but if it does, it is an emergency and they should go to the emergency room.  I discussed surgical repair would prevent that.  They would like to proceed with repair.  2.  I discussed the different options for repair.  I discussed open, laparoscopic and robotic umbilical hernia repair.  In my hands robotic umbilical hernia repair offers the fastest recovery due to less pain and the lowest risk of short term and long term complications.  Also, with the robot I would be able to do a pre-peritoneal repair, protecting his bowel from the mesh I place.  3.  I discussed the pre-operative, aditi-operative and post-operative course of robotic umbilical hernia repair.   I discussed avoiding lifting over 20 pounds for two weeks after surgery.  I discussed potential risks, which include, but are not limited to, bleeding, infection, anesthetic risk, prolonged swelling, recurrence, chronic pain, conversion to open.  He agrees to proceed.  4.  He will require a pre-operative H&P, he will contact his primary care provider to schedule one prior to surgery. He will be schedule for a robotic umbilical hernia repair when we can find a mutually agreeable time.     Alberto Bain M.D.  [facility]  [phone]    Please route or send letter  to:  Primary Care Provider (PCP) and Referring Provider

## 2024-10-29 DIAGNOSIS — E78.5 HYPERLIPIDEMIA WITH TARGET LDL LESS THAN 130: ICD-10-CM

## 2024-10-29 RX ORDER — ATORVASTATIN CALCIUM 10 MG/1
TABLET, FILM COATED ORAL
Qty: 90 TABLET | Refills: 2 | Status: SHIPPED | OUTPATIENT
Start: 2024-10-29

## 2024-11-14 ENCOUNTER — OFFICE VISIT (OUTPATIENT)
Dept: FAMILY MEDICINE | Facility: CLINIC | Age: 79
End: 2024-11-14
Payer: COMMERCIAL

## 2024-11-14 VITALS
OXYGEN SATURATION: 97 % | DIASTOLIC BLOOD PRESSURE: 60 MMHG | SYSTOLIC BLOOD PRESSURE: 124 MMHG | WEIGHT: 132 LBS | HEART RATE: 74 BPM | BODY MASS INDEX: 21.97 KG/M2 | TEMPERATURE: 96.9 F | RESPIRATION RATE: 19 BRPM

## 2024-11-14 DIAGNOSIS — Z01.818 PREOP GENERAL PHYSICAL EXAM: Primary | ICD-10-CM

## 2024-11-14 DIAGNOSIS — K42.9 UMBILICAL HERNIA WITHOUT OBSTRUCTION AND WITHOUT GANGRENE: ICD-10-CM

## 2024-11-14 LAB
ANION GAP SERPL CALCULATED.3IONS-SCNC: 8 MMOL/L (ref 7–15)
BUN SERPL-MCNC: 16.3 MG/DL (ref 8–23)
CALCIUM SERPL-MCNC: 9.1 MG/DL (ref 8.8–10.4)
CHLORIDE SERPL-SCNC: 100 MMOL/L (ref 98–107)
CREAT SERPL-MCNC: 0.93 MG/DL (ref 0.67–1.17)
EGFRCR SERPLBLD CKD-EPI 2021: 84 ML/MIN/1.73M2
GLUCOSE SERPL-MCNC: 119 MG/DL (ref 70–99)
HCO3 SERPL-SCNC: 28 MMOL/L (ref 22–29)
POTASSIUM SERPL-SCNC: 4.5 MMOL/L (ref 3.4–5.3)
SODIUM SERPL-SCNC: 136 MMOL/L (ref 135–145)

## 2024-11-14 PROCEDURE — 80048 BASIC METABOLIC PNL TOTAL CA: CPT | Performed by: PHYSICIAN ASSISTANT

## 2024-11-14 PROCEDURE — 99214 OFFICE O/P EST MOD 30 MIN: CPT | Performed by: PHYSICIAN ASSISTANT

## 2024-11-14 PROCEDURE — 36415 COLL VENOUS BLD VENIPUNCTURE: CPT | Performed by: PHYSICIAN ASSISTANT

## 2024-11-14 ASSESSMENT — PAIN SCALES - GENERAL: PAINLEVEL_OUTOF10: NO PAIN (0)

## 2024-11-14 NOTE — PATIENT INSTRUCTIONS
How to Take Your Medication Before Surgery  Preoperative Medication Instructions          Patient Education   Preparing for Your Surgery  For Adults  Getting started  In most cases, a nurse will call to review your health history and instructions. They will give you an arrival time based on your scheduled surgery time. Please be ready to share:  Your doctor's clinic name and phone number  Your medical, surgical, and anesthesia history  A list of allergies and sensitivities  A list of medicines, including herbal treatments and over-the-counter drugs  Whether the patient has a legal guardian (ask how to send us the papers in advance)  Note: You may not receive a call if you were seen at our PAC (Preoperative Assessment Center).  Please tell us if you're pregnant--or if there's any chance you might be pregnant. Some surgeries may injure a fetus (unborn baby), so they require a pregnancy test. Surgeries that are safe for a fetus don't always need a test, and you can choose whether to have one.   Preparing for surgery  Within 10 to 30 days of surgery: Have a pre-op exam (sometimes called an H&P, or History and Physical). This can be done at a clinic or pre-operative center.  If you're having a , you may not need this exam. Talk to your care team.  At your pre-op exam, talk to your care team about all medicines you take. (This includes CBD oil and any drugs, such as THC, marijuana, and other forms of cannabis.) If you need to stop any medicine before surgery, ask when to start taking it again.  This is for your safety. Many medicines and drugs can make you bleed too much during surgery. Some change how well surgery (anesthesia) drugs work.  Call your insurance company to let them know you're having surgery. (If you don't have insurance, call [phone].)  Call your clinic if there's any change in your health. This includes a scrape or scratch near the surgery site, or any signs of a cold (sore throat, runny  nose, cough, rash, fever).  Eating and drinking guidelines  For your safety: Unless your surgeon tells you otherwise, follow the guidelines below.  Eat and drink as normal until 8 hours before you arrive for surgery. After that, no food or milk. You can spit out gum when you arrive.  Drink clear liquids until 2 hours before you arrive. These are liquids you can see through, like water, Gatorade, and Propel Water. They also include plain black coffee and tea (no cream or milk).  No alcohol for 24 hours before you arrive. The night before surgery, stop any drinks that contain THC.  If your care team tells you to take medicine on the morning of surgery, it's okay to take it with a sip of water. No other medicines or drugs are allowed (including CBD oil)--follow your care team's instructions.  If you have questions the day of surgery, call your hospital or surgery center.   Preventing infection  Shower or bathe the night before and the morning of surgery. Follow the instructions your clinic gave you. (If no instructions, use regular soap.)  Don't shave or clip hair near your surgery site. We'll remove the hair if needed.  Don't smoke or vape the morning of surgery. No chewing tobacco for 6 hours before you arrive. A nicotine patch is okay. You may spit out nicotine gum when you arrive.  For some surgeries, the surgeon will tell you to fully quit smoking and nicotine.  We will make every effort to keep you safe from infection. We will:  Clean our hands often with soap and water (or an alcohol-based hand rub).  Clean the skin at your surgery site with a special soap that kills germs.  Give you a special gown to keep you warm. (Cold raises the risk of infection.)  Wear hair covers, masks, gowns, and gloves during surgery.  Give antibiotic medicine, if prescribed. Not all surgeries need this medicine.  What to bring on the day of surgery  Photo ID and insurance card  Copy of your health care directive, if you have  one  Glasses and hearing aids (bring cases)  You can't wear contacts during surgery  Inhaler and eye drops, if you use them (tell us about these when you arrive)  CPAP machine or breathing device, if you use them  A few personal items, if spending the night  If you have . . .  A pacemaker, ICD (cardiac defibrillator), or other implant: Bring the ID card.  An implanted stimulator: Bring the remote control.  A legal guardian: Bring a copy of the certified (court-stamped) guardianship papers.  Please remove any jewelry, including body piercings. Leave jewelry and other valuables at home.  If you're going home the day of surgery  You must have a responsible adult drive you home. They should stay with you overnight as well.  If you don't have someone to stay with you, and you aren't safe to go home alone, we may keep you overnight. Insurance often won't pay for this.  After surgery  If it's hard to control your pain or you need more pain medicine, please call your surgeon's office.  Questions?   If you have any questions for your care team, list them here:   ____________________________________________________________________________________________________________________________________________________________________________________________________________________________________________________________  For informational purposes only. Not to replace the advice of your health care provider. Copyright   2003, 2019 [facility]. All rights reserved. Clinically reviewed by Floyd Dodd MD. [employer] [id number] - REV 08/24.

## 2024-11-14 NOTE — PROGRESS NOTES
Preoperative Evaluation  [facility]  [address], [address]  [facility] [address]  Phone: [phone]  Primary Provider: Norm Tenorio PA-C  Pre-op Performing Provider: Norm Tenorio PA-C  Nov 14, 2024 11/14/2024   Surgical Information   What procedure is being done? pre op    Facility or Hospital where procedure/surgery will be performed: [facility]    Who is doing the procedure / surgery? dr bowman    Date of surgery / procedure: nov 19    Time of surgery / procedure: noon    Where do you plan to recover after surgery? at home with family        Patient-reported     Fax number for surgical facility: Note does not need to be faxed, will be available electronically in Epic.    Assessment & Plan     The proposed surgical procedure is considered INTERMEDIATE risk.    Preop general physical exam    - Basic metabolic panel  (Ca, Cl, CO2, Creat, Gluc, K, Na, BUN); Future  - Basic metabolic panel  (Ca, Cl, CO2, Creat, Gluc, K, Na, BUN)    Umbilical hernia without obstruction and without gangrene    - Basic metabolic panel  (Ca, Cl, CO2, Creat, Gluc, K, Na, BUN); Future  - Basic metabolic panel  (Ca, Cl, CO2, Creat, Gluc, K, Na, BUN)            - No identified additional risk factors other than previously addressed    Antiplatelet or Anticoagulation Medication Instructions   - Patient is on no antiplatelet or anticoagulation medications.    Additional Medication Instructions   - Statins: Continue taking on the day of surgery.    - LABA, inhaled corticosteroid, long-acting anticholinergics: Continue without modification.    Recommendation  Approval given to proceed with proposed procedure, without further diagnostic evaluation.    Sascha Sifuentes is a 79 year old, presenting for the following:  Pre-Op Exam (/)          11/14/2024     2:15 PM   Additional Questions   Roomed by romelia Frank by [facility]         11/14/2024     2:15 PM   Patient  Reported Additional Medications   Patient reports taking the following new medications n/a     HPI related to upcoming procedure: 78 y/o male with symptomatic umbilical hernia.  Planning on surgical correction.  Has been feeling well lately.  Patient has had surgery in past and has never had any issues with anaesthesia, bleeding or blood clots.         11/14/2024   Pre-Op Questionnaire   Have you ever had a heart attack or stroke? No    Have you ever had surgery on your heart or blood vessels, such as a stent placement, a coronary artery bypass, or surgery on an artery in your head, neck, heart, or legs? No    Do you have chest pain with activity? No    Do you have a history of heart failure? No    Do you currently have a cold, bronchitis or symptoms of other infection? No    Do you have a cough, shortness of breath, or wheezing? No    Do you or anyone in your family have previous history of blood clots? No    Do you or does anyone in your family have a serious bleeding problem such as prolonged bleeding following surgeries or cuts? No    Have you ever had problems with anemia or been told to take iron pills? No    Have you had any abnormal blood loss such as black, tarry or bloody stools? No    Have you ever had a blood transfusion? No    Are you willing to have a blood transfusion if it is medically needed before, during, or after your surgery? Yes    Have you or any of your relatives ever had problems with anesthesia? No    Do you have sleep apnea, excessive snoring or daytime drowsiness? No    Do you have any artifical heart valves or other implanted medical devices like a pacemaker, defibrillator, or continuous glucose monitor? No    Do you have artificial joints? No    Are you allergic to latex? No        Patient-reported     Health Care Directive  Patient does not have a Health Care Directive: Discussed advance care planning with patient; however, patient declined at this time.    Preoperative Review of     reviewed - controlled substances reflected in medication list.      Status of Chronic Conditions:  See problem list for active medical problems.  Problems all longstanding and stable, except as noted/documented.  See ROS for pertinent symptoms related to these conditions.    Patient Active Problem List    Diagnosis Date Noted    Acute bilateral low back pain without sciatica 06/07/2023     Priority: Medium    Acute low back pain 06/07/2023     Priority: Medium    Acute pain of right shoulder 01/03/2023     Priority: Medium    Stress fracture of left pubic ramus 07/19/2020     Priority: Medium    Tachycardia 01/17/2017     Priority: Medium    Chest pain 01/17/2017     Priority: Medium    Transient insomnia 01/05/2016     Priority: Medium     Patient is followed by Russell Alcantara for ongoing prescription of benzodiazepines.  All refills should be approved by this provider, or covering partner.    Medication(s): Ambien.   Maximum quantity per month: #15 every 3 months  Clinic visit frequency required:  Q 6 months    Controlled substance agreement on file: No    Last [facility] website verification:  done on 5/7/2019   [email or web address]          History of hematuria 10/21/2015     Priority: Medium    Hyperglycemia 10/21/2015     Priority: Medium     Diagnosis updated by automated process. Provider to review and confirm.      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     Priority: Medium    Vitreous degeneration 05/31/2008     Priority: Medium    Allergic rhinitis 04/29/2003     Priority: Medium      Past Medical History:   Diagnosis Date    Deafness in right ear 2002    HL (hearing loss)     Left    Palpitations     Ringing in ears     SVT (supraventricular tachycardia) (H)     Tachycardia     Uncomplicated asthma      Past Surgical History:   Procedure Laterality  "Date    COLONOSCOPY N/A 10/10/2017    Procedure: COLONOSCOPY;  colonoscopy;  Surgeon: Ervin Irizarry MD;  Location:  GI    ENT SURGERY  [facility]    ear surgery     Current Outpatient Medications   Medication Sig Dispense Refill    atorvastatin (LIPITOR) 10 MG tablet TAKE 1 TABLET(10 MG) BY MOUTH IN THE MORNING 90 tablet 2    fluticasone-vilanterol (BREO ELLIPTA) 100-25 MCG/ACT inhaler Inhale 1 puff into the lungs daily. 3 each 3    fluticasone-vilanterol (BREO ELLIPTA) 200-25 MCG/INH inhaler Inhale 1 puff into the lungs in the morning. 3 each 3    zolpidem (AMBIEN) 5 MG tablet Take 1 tablet (5 mg) by mouth nightly as needed for sleep. 15 tablet 0       Allergies   Allergen Reactions    Cyclobenzaprine Unknown     Flushing    Seasonal Allergies      Spring and fall allergies        Social History     Tobacco Use    Smoking status: Never    Smokeless tobacco: Never   Substance Use Topics    Alcohol use: Yes     Alcohol/week: 0.0 standard drinks of alcohol     Comment: 1 - 2 drinks daily in summer, less in winter       History   Drug Use No             Review of Systems  Constitutional, HEENT, cardiovascular, pulmonary, gi and gu systems are negative, except as otherwise noted.    Objective    /60 (BP Location: Left arm, Patient Position: Sitting, Cuff Size: Adult Regular)   Pulse 74   Temp 96.9  F (36.1  C) (Temporal)   Resp 19   Wt 59.9 kg (132 lb)   SpO2 97%   BMI 21.97 kg/m     Estimated body mass index is 21.97 kg/m  as calculated from the following:    Height as of 10/2/24: 1.651 m (5' 5\").    Weight as of this encounter: 59.9 kg (132 lb).  Physical Exam  GENERAL: alert and no distress  EYES: Eyes grossly normal to inspection  RESP: lungs clear to auscultation - no rales, rhonchi or wheezes  CV: regular rate and rhythm, normal S1 S2, no S3 or S4, no murmur, click or rub, no peripheral edema   PSYCH: mentation appears normal, affect normal/bright    Recent Labs   Lab Test 09/06/24  1129   HGB 13.8   PLT " 249      POTASSIUM 4.4   CR 0.93        Diagnostics  Labs pending at this time.  Results will be reviewed when available.   No EKG required, no history of coronary heart disease, significant arrhythmia, peripheral arterial disease or other structural heart disease.    Revised Cardiac Risk Index (RCRI)  The patient has the following serious cardiovascular risks for perioperative complications:   - No serious cardiac risks = 0 points     RCRI Interpretation: 0 points: Class I (very low risk - 0.4% complication rate)         Signed Electronically by: Norm Tenorio PA-C  A copy of this evaluation report is provided to the requesting physician.

## 2024-11-19 ENCOUNTER — HOSPITAL ENCOUNTER (OUTPATIENT)
Facility: CLINIC | Age: 79
Discharge: HOME | End: 2024-11-19
Attending: SURGERY | Admitting: SURGERY
Payer: COMMERCIAL

## 2024-11-19 ENCOUNTER — ANESTHESIA EVENT (OUTPATIENT)
Dept: SURGERY | Facility: CLINIC | Age: 79
End: 2024-11-19
Payer: COMMERCIAL

## 2024-11-19 ENCOUNTER — ANESTHESIA (OUTPATIENT)
Dept: SURGERY | Facility: CLINIC | Age: 79
End: 2024-11-19
Payer: COMMERCIAL

## 2024-11-19 VITALS
WEIGHT: 134.1 LBS | TEMPERATURE: 97 F | HEIGHT: 65 IN | OXYGEN SATURATION: 96 % | DIASTOLIC BLOOD PRESSURE: 72 MMHG | BODY MASS INDEX: 22.34 KG/M2 | SYSTOLIC BLOOD PRESSURE: 139 MMHG | RESPIRATION RATE: 20 BRPM | HEART RATE: 90 BPM

## 2024-11-19 DIAGNOSIS — G89.18 POSTOPERATIVE PAIN: Primary | ICD-10-CM

## 2024-11-19 DIAGNOSIS — K42.9 UMBILICAL HERNIA WITHOUT OBSTRUCTION AND WITHOUT GANGRENE: ICD-10-CM

## 2024-11-19 PROCEDURE — 250N000013 HC RX MED GY IP 250 OP 250 PS 637: Performed by: PHYSICIAN ASSISTANT

## 2024-11-19 PROCEDURE — 49593 RPR AA HRN 1ST 3-10 RDC: CPT | Performed by: SURGERY

## 2024-11-19 PROCEDURE — 370N000017 HC ANESTHESIA TECHNICAL FEE, PER MIN: Performed by: SURGERY

## 2024-11-19 PROCEDURE — 250N000009 HC RX 250: Performed by: NURSE ANESTHETIST, CERTIFIED REGISTERED

## 2024-11-19 PROCEDURE — 710N000012 HC RECOVERY PHASE 2, PER MINUTE: Performed by: SURGERY

## 2024-11-19 PROCEDURE — 272N000001 HC OR GENERAL SUPPLY STERILE: Performed by: SURGERY

## 2024-11-19 PROCEDURE — 250N000009 HC RX 250: Performed by: SURGERY

## 2024-11-19 PROCEDURE — 250N000011 HC RX IP 250 OP 636: Performed by: NURSE ANESTHETIST, CERTIFIED REGISTERED

## 2024-11-19 PROCEDURE — 250N000011 HC RX IP 250 OP 636: Performed by: SURGERY

## 2024-11-19 PROCEDURE — 710N000009 HC RECOVERY PHASE 1, LEVEL 1, PER MIN: Performed by: SURGERY

## 2024-11-19 PROCEDURE — 360N000080 HC SURGERY LEVEL 7, PER MIN: Performed by: SURGERY

## 2024-11-19 PROCEDURE — 999N000141 HC STATISTIC PRE-PROCEDURE NURSING ASSESSMENT: Performed by: SURGERY

## 2024-11-19 PROCEDURE — 250N000011 HC RX IP 250 OP 636: Performed by: ANESTHESIOLOGY

## 2024-11-19 PROCEDURE — S2900 ROBOTIC SURGICAL SYSTEM: HCPCS | Performed by: SURGERY

## 2024-11-19 PROCEDURE — 258N000003 HC RX IP 258 OP 636: Performed by: NURSE ANESTHETIST, CERTIFIED REGISTERED

## 2024-11-19 PROCEDURE — 250N000025 HC SEVOFLURANE, PER MIN: Performed by: SURGERY

## 2024-11-19 PROCEDURE — C1781 MESH (IMPLANTABLE): HCPCS | Performed by: SURGERY

## 2024-11-19 PROCEDURE — 49593 RPR AA HRN 1ST 3-10 RDC: CPT | Mod: AS | Performed by: PHYSICIAN ASSISTANT

## 2024-11-19 DEVICE — LAPAROSCOPIC SELF-FIXATING MESH POLYESTER WITH POLYLACTIC ACID GRIPS AND COLLAGEN FILM
Type: IMPLANTABLE DEVICE | Site: ABDOMEN | Status: FUNCTIONAL
Brand: PROGRIP

## 2024-11-19 RX ORDER — LIDOCAINE HYDROCHLORIDE 20 MG/ML
INJECTION, SOLUTION INFILTRATION; PERINEURAL PRN
Status: DISCONTINUED | OUTPATIENT
Start: 2024-11-19 | End: 2024-11-19

## 2024-11-19 RX ORDER — SODIUM CHLORIDE, SODIUM LACTATE, POTASSIUM CHLORIDE, CALCIUM CHLORIDE 600; 310; 30; 20 MG/100ML; MG/100ML; MG/100ML; MG/100ML
INJECTION, SOLUTION INTRAVENOUS CONTINUOUS PRN
Status: DISCONTINUED | OUTPATIENT
Start: 2024-11-19 | End: 2024-11-19

## 2024-11-19 RX ORDER — HYDROCODONE BITARTRATE AND ACETAMINOPHEN 5; 325 MG/1; MG/1
1 TABLET ORAL EVERY 4 HOURS PRN
Qty: 10 TABLET | Refills: 0 | Status: SHIPPED | OUTPATIENT
Start: 2024-11-19

## 2024-11-19 RX ORDER — FENTANYL CITRATE 50 UG/ML
50 INJECTION, SOLUTION INTRAMUSCULAR; INTRAVENOUS EVERY 5 MIN PRN
Status: DISCONTINUED | OUTPATIENT
Start: 2024-11-19 | End: 2024-11-19 | Stop reason: HOSPADM

## 2024-11-19 RX ORDER — ONDANSETRON 2 MG/ML
4 INJECTION INTRAMUSCULAR; INTRAVENOUS EVERY 30 MIN PRN
Status: DISCONTINUED | OUTPATIENT
Start: 2024-11-19 | End: 2024-11-19 | Stop reason: HOSPADM

## 2024-11-19 RX ORDER — DEXAMETHASONE SODIUM PHOSPHATE 4 MG/ML
4 INJECTION, SOLUTION INTRA-ARTICULAR; INTRALESIONAL; INTRAMUSCULAR; INTRAVENOUS; SOFT TISSUE
Status: DISCONTINUED | OUTPATIENT
Start: 2024-11-19 | End: 2024-11-19 | Stop reason: HOSPADM

## 2024-11-19 RX ORDER — FENTANYL CITRATE 50 UG/ML
INJECTION, SOLUTION INTRAMUSCULAR; INTRAVENOUS PRN
Status: DISCONTINUED | OUTPATIENT
Start: 2024-11-19 | End: 2024-11-19

## 2024-11-19 RX ORDER — DEXAMETHASONE SODIUM PHOSPHATE 4 MG/ML
INJECTION, SOLUTION INTRA-ARTICULAR; INTRALESIONAL; INTRAMUSCULAR; INTRAVENOUS; SOFT TISSUE PRN
Status: DISCONTINUED | OUTPATIENT
Start: 2024-11-19 | End: 2024-11-19

## 2024-11-19 RX ORDER — MAGNESIUM HYDROXIDE 1200 MG/15ML
LIQUID ORAL PRN
Status: DISCONTINUED | OUTPATIENT
Start: 2024-11-19 | End: 2024-11-19 | Stop reason: HOSPADM

## 2024-11-19 RX ORDER — HYDROCODONE BITARTRATE AND ACETAMINOPHEN 5; 325 MG/1; MG/1
1 TABLET ORAL
Status: COMPLETED | OUTPATIENT
Start: 2024-11-19 | End: 2024-11-19

## 2024-11-19 RX ORDER — ONDANSETRON 4 MG/1
4 TABLET, ORALLY DISINTEGRATING ORAL EVERY 30 MIN PRN
Status: DISCONTINUED | OUTPATIENT
Start: 2024-11-19 | End: 2024-11-19 | Stop reason: HOSPADM

## 2024-11-19 RX ORDER — PROPOFOL 10 MG/ML
INJECTION, EMULSION INTRAVENOUS PRN
Status: DISCONTINUED | OUTPATIENT
Start: 2024-11-19 | End: 2024-11-19

## 2024-11-19 RX ORDER — CEFAZOLIN SODIUM/WATER 2 G/20 ML
2 SYRINGE (ML) INTRAVENOUS
Status: DISCONTINUED | OUTPATIENT
Start: 2024-11-19 | End: 2024-11-19 | Stop reason: HOSPADM

## 2024-11-19 RX ORDER — SODIUM CHLORIDE, SODIUM LACTATE, POTASSIUM CHLORIDE, CALCIUM CHLORIDE 600; 310; 30; 20 MG/100ML; MG/100ML; MG/100ML; MG/100ML
INJECTION, SOLUTION INTRAVENOUS CONTINUOUS
Status: DISCONTINUED | OUTPATIENT
Start: 2024-11-19 | End: 2024-11-19 | Stop reason: HOSPADM

## 2024-11-19 RX ORDER — NALOXONE HYDROCHLORIDE 0.4 MG/ML
0.1 INJECTION, SOLUTION INTRAMUSCULAR; INTRAVENOUS; SUBCUTANEOUS
Status: DISCONTINUED | OUTPATIENT
Start: 2024-11-19 | End: 2024-11-19 | Stop reason: HOSPADM

## 2024-11-19 RX ORDER — HEPARIN SODIUM 5000 [USP'U]/.5ML
5000 INJECTION, SOLUTION INTRAVENOUS; SUBCUTANEOUS
Status: COMPLETED | OUTPATIENT
Start: 2024-11-19 | End: 2024-11-19

## 2024-11-19 RX ORDER — BUPIVACAINE HYDROCHLORIDE AND EPINEPHRINE 2.5; 5 MG/ML; UG/ML
INJECTION, SOLUTION INFILTRATION; PERINEURAL PRN
Status: DISCONTINUED | OUTPATIENT
Start: 2024-11-19 | End: 2024-11-19 | Stop reason: HOSPADM

## 2024-11-19 RX ORDER — HYDROMORPHONE HCL IN WATER/PF 6 MG/30 ML
0.4 PATIENT CONTROLLED ANALGESIA SYRINGE INTRAVENOUS EVERY 5 MIN PRN
Status: DISCONTINUED | OUTPATIENT
Start: 2024-11-19 | End: 2024-11-19 | Stop reason: HOSPADM

## 2024-11-19 RX ORDER — FENTANYL CITRATE 50 UG/ML
25 INJECTION, SOLUTION INTRAMUSCULAR; INTRAVENOUS EVERY 5 MIN PRN
Status: DISCONTINUED | OUTPATIENT
Start: 2024-11-19 | End: 2024-11-19 | Stop reason: HOSPADM

## 2024-11-19 RX ORDER — ONDANSETRON 2 MG/ML
INJECTION INTRAMUSCULAR; INTRAVENOUS PRN
Status: DISCONTINUED | OUTPATIENT
Start: 2024-11-19 | End: 2024-11-19

## 2024-11-19 RX ORDER — CEFAZOLIN SODIUM/WATER 2 G/20 ML
2 SYRINGE (ML) INTRAVENOUS SEE ADMIN INSTRUCTIONS
Status: DISCONTINUED | OUTPATIENT
Start: 2024-11-19 | End: 2024-11-19 | Stop reason: HOSPADM

## 2024-11-19 RX ORDER — AMOXICILLIN 250 MG
1-2 CAPSULE ORAL 2 TIMES DAILY
Qty: 20 TABLET | Refills: 0 | Status: SHIPPED | OUTPATIENT
Start: 2024-11-19

## 2024-11-19 RX ORDER — HYDROMORPHONE HCL IN WATER/PF 6 MG/30 ML
0.2 PATIENT CONTROLLED ANALGESIA SYRINGE INTRAVENOUS EVERY 5 MIN PRN
Status: DISCONTINUED | OUTPATIENT
Start: 2024-11-19 | End: 2024-11-19 | Stop reason: HOSPADM

## 2024-11-19 RX ADMIN — ROCURONIUM BROMIDE 10 MG: 50 INJECTION, SOLUTION INTRAVENOUS at 13:00

## 2024-11-19 RX ADMIN — Medication 200 MG: at 13:17

## 2024-11-19 RX ADMIN — LIDOCAINE HYDROCHLORIDE 100 MG: 20 INJECTION, SOLUTION INFILTRATION; PERINEURAL at 12:12

## 2024-11-19 RX ADMIN — PHENYLEPHRINE HYDROCHLORIDE 100 MCG: 10 INJECTION INTRAVENOUS at 12:30

## 2024-11-19 RX ADMIN — HYDROCODONE BITARTRATE AND ACETAMINOPHEN 1 TABLET: 5; 325 TABLET ORAL at 14:10

## 2024-11-19 RX ADMIN — FENTANYL CITRATE 50 MCG: 50 INJECTION INTRAMUSCULAR; INTRAVENOUS at 12:12

## 2024-11-19 RX ADMIN — ROCURONIUM BROMIDE 40 MG: 50 INJECTION, SOLUTION INTRAVENOUS at 12:13

## 2024-11-19 RX ADMIN — Medication 2 G: at 11:48

## 2024-11-19 RX ADMIN — SODIUM CHLORIDE, POTASSIUM CHLORIDE, SODIUM LACTATE AND CALCIUM CHLORIDE: 600; 310; 30; 20 INJECTION, SOLUTION INTRAVENOUS at 11:52

## 2024-11-19 RX ADMIN — DEXAMETHASONE SODIUM PHOSPHATE 4 MG: 4 INJECTION, SOLUTION INTRA-ARTICULAR; INTRALESIONAL; INTRAMUSCULAR; INTRAVENOUS; SOFT TISSUE at 12:27

## 2024-11-19 RX ADMIN — FENTANYL CITRATE 50 MCG: 50 INJECTION INTRAMUSCULAR; INTRAVENOUS at 13:17

## 2024-11-19 RX ADMIN — PROPOFOL 120 MG: 10 INJECTION, EMULSION INTRAVENOUS at 12:12

## 2024-11-19 RX ADMIN — HEPARIN SODIUM 5000 UNITS: 5000 INJECTION, SOLUTION INTRAVENOUS; SUBCUTANEOUS at 10:57

## 2024-11-19 RX ADMIN — FENTANYL CITRATE 50 MCG: 50 INJECTION, SOLUTION INTRAMUSCULAR; INTRAVENOUS at 13:57

## 2024-11-19 RX ADMIN — ONDANSETRON 4 MG: 2 INJECTION INTRAMUSCULAR; INTRAVENOUS at 13:12

## 2024-11-19 RX ADMIN — PHENYLEPHRINE HYDROCHLORIDE 0.3 MCG/KG/MIN: 10 INJECTION INTRAVENOUS at 12:36

## 2024-11-19 ASSESSMENT — ACTIVITIES OF DAILY LIVING (ADL)
ADLS_ACUITY_SCORE: 0

## 2024-11-19 ASSESSMENT — ENCOUNTER SYMPTOMS: DYSRHYTHMIAS: 1

## 2024-11-19 NOTE — DISCHARGE INSTRUCTIONS
[facility]  Discharge Instructions: Post-Operative Umbilical Hernia Repair    ACTIVITY  Take frequent, short walks and increase your activity gradually.    Avoid strenuous physical activity or heavy lifting greater than 15 lbs. for 2-3 weeks.  You may climb stairs.  You may drive without restrictions when you are not using any prescription pain medication or muscle relaxant and feel comfortable in a car.  You may return to work/school when you are comfortable without any prescription pain medication.   You may wear an abdominal binder for comfort for 2-3 weeks from your surgery.  You can wash the abdominal binder and dry it on low heat in the dryer.    WOUND CARE  You may remove your outer dressing or Band-Aids and shower 48 hours after the surgery.  Pat your incisions dry and leave them open to air.  Re-apply dressing (Band-Aids or gauze/tape) as needed for comfort or drainage.  You may have steri-strips (looks like white tape) on your incision.  You may peel off the steri-strips 2 weeks after your surgery if they have not peeled off on their own.  If you have skin glue, it will peel up and fall off on its own.  Do not soak your incisions in a tub or pool for 2 weeks.   Do not apply any lotions, creams, or ointments to your incisions.  A ridge under your incisions is normal and will gradually resolve.    DIET  Start with liquids, then gradually resume your regular diet as tolerated.   Drink plenty of fluids to stay hydrated.    PAIN  Expect some tenderness and discomfort at the incision site(s).  Use the prescribed pain medication/muscle relaxant at your discretion.  Expect gradual resolution of your pain over several days.  You may take ibuprofen with food (unless you have been told not to) or acetaminophen/Tylenol instead of or in addition to your prescribed pain medication.  However, if you are taking Norco do not take any additional acetaminophen/Tylenol.  Do not drink alcohol or  drive while you are taking pain medications or muscle relaxants.  You may apply ice to your incisions in 20 minute intervals as needed for the next 48 hours.  After that time, consider switching to heat if you prefer.    EXPECTATIONS  Pain medications can cause constipation.  Limit use when possible.  Take an over the counter or prescribed stool softener/stimulant, such as Colace or Senna, 1-2 times a day with plenty of water.  You may take a mild over the counter laxative, such as Miralax or a suppository, as needed.  You may take 1 oz. (2 tablespoons) Milk of Magnesia the evening following surgery to encourage bowel movement.  You make discontinue these medications once you are having regular bowel movements and/or are no longer taking your narcotic pain medication.  You may have shoulder or upper back discomfort due to the gas used in surgery.  This is temporary and should resolve in 48-72 hours.  Short, frequent walks may help with this.  If you are unable to urinate for 8 hours or feel as though you are not emptying your bladder adequately, we recommend you seek care at an ER or Urgent Care facility for possible catheter placement.     FOLLOW UP  Our office will contact you in approximately 2-3 weeks to check on your progress and answer any questions you may have.  If you are doing well, you will not need to return for a follow up appointment.  If any concerns are identified over the phone, we will help you make an appointment to see a provider.  If you have not received a phone call, have any questions or concerns, or would like to be seen, please call us at [phone] and ask to speak with our nurse.  We are located at [address] [place] [address].    CALL OUR OFFICE -[phone] IF YOU HAVE:   Chills or fever above 101 F.  Increased redness or drainage at your incisions.  Significant bleeding.  Pain not relieved by your pain medication or rest.  Increasing pain after the first 48  hours.  Any other concerns or questions.             Revised October 2022      Same Day Surgery Discharge Instructions for  Sedation and General Anesthesia     It's not unusual to feel dizzy, light-headed or faint for up to 24 hours after surgery or while taking pain medication.  If you have these symptoms: sit for a few minutes before standing and have someone assist you when you get up to walk or use the bathroom.    You should rest and relax for the next 24 hours. We recommend you make arrangements to have an adult stay with you for at least 24 hours after your discharge.  Avoid hazardous and strenuous activity.    DO NOT DRIVE any vehicle or operate mechanical equipment for 24 hours following the end of your surgery.  Even though you may feel normal, your reactions may be affected by the medication you have received.    Do not drink alcoholic beverages for 24 hours following surgery.     Slowly progress to your regular diet as you feel able. It's not unusual to feel nauseated and/or vomit after receiving anesthesia.  If you develop these symptoms, drink clear liquids (apple juice, ginger ale, broth, 7-up, etc. ) until you feel better.  If your nausea and vomiting persists for 24 hours, please notify your surgeon.      All narcotic pain medications, along with inactivity and anesthesia, can cause constipation. Drinking plenty of liquids and increasing fiber intake will help.    For any questions of a medical nature, call your surgeon.    Do not make important decisions for 24 hours.    If you had general anesthesia, you may have a sore throat for a couple of days related to the breathing tube used during surgery.  You may use Cepacol lozenges to help with this discomfort.  If it worsens or if you develop a fever, contact your surgeon.     If you feel your pain is not well managed with the pain medications prescribed by your surgeon, please contact your surgeon's office to let them know so they can address your  concerns.

## 2024-11-19 NOTE — ANESTHESIA POSTPROCEDURE EVALUATION
Patient: Bear Gudio    Procedure: Procedure(s):  Robotic umbilical hernia repair       Anesthesia Type:  General    Note:  Disposition: Outpatient   Postop Pain Control: Uneventful            Sign Out: Well controlled pain   PONV: No   Neuro/Psych: Uneventful            Sign Out: Acceptable/Baseline neuro status   Airway/Respiratory: Uneventful            Sign Out: Acceptable/Baseline resp. status   CV/Hemodynamics: Uneventful            Sign Out: Acceptable CV status; No obvious hypovolemia; No obvious fluid overload   Other NRE:    DID A NON-ROUTINE EVENT OCCUR?        Last vitals:  Vitals Value Taken Time   /83 11/19/24 1415   Temp 37  C (98.6  F) 11/19/24 1415   Pulse 92 11/19/24 1420   Resp 29 11/19/24 1420   SpO2 94 % 11/19/24 1419   Vitals shown include unfiled device data.    Electronically Signed By: Angeles Hughes MD  November 19, 2024  2:46 PM

## 2024-11-19 NOTE — ANESTHESIA PREPROCEDURE EVALUATION
Anesthesia Pre-Procedure Evaluation    Patient: Bear Guido   MRN: [id number] : 1945        Procedure : Procedure(s):  Robotic umbilical hernia repair          Past Medical History:   Diagnosis Date    Deafness in right ear     HL (hearing loss)     Left    Palpitations     Ringing in ears     SVT (supraventricular tachycardia) (H)     Tachycardia     Uncomplicated asthma       Past Surgical History:   Procedure Laterality Date    COLONOSCOPY N/A 10/10/2017    Procedure: COLONOSCOPY;  colonoscopy;  Surgeon: Ervin Irizarry MD;  Location:  GI    ENT SURGERY      ear surgery      Allergies   Allergen Reactions    Cyclobenzaprine Unknown     Flushing    Seasonal Allergies      Spring and fall allergies      Social History     Tobacco Use    Smoking status: Never    Smokeless tobacco: Never   Substance Use Topics    Alcohol use: Yes     Alcohol/week: 0.0 standard drinks of alcohol     Comment: 1 - 2 drinks daily in summer, less in winter      Wt Readings from Last 1 Encounters:   24 59.9 kg (132 lb)        Anesthesia Evaluation   Pt has had prior anesthetic.     No history of anesthetic complications       ROS/MED HX  ENT/Pulmonary:     (+)                     Moderate Persistent, asthma (per patient very well controlled on his inhaler)               (-) sleep apnea   Neurologic:       Cardiovascular:     (+) Dyslipidemia - -   -  - -                        dysrhythmias (SVT, per patient none recently.  And it was eval'ed by cardiology and not told he should get an ablation or any treatment),              METS/Exercise Tolerance: >4 METS    Hematologic:       Musculoskeletal:       GI/Hepatic:    (-) GERD   Renal/Genitourinary:       Endo:       Psychiatric/Substance Use:       Infectious Disease:       Malignancy:       Other:            Physical Exam    Airway        Mallampati: III   TM distance: > 3 FB   Neck ROM: full   Mouth opening: > 3 cm    Respiratory Devices and Support         Dental  "      (+) Minor Abnormalities - some fillings, tiny chips      Cardiovascular   cardiovascular exam normal          Pulmonary   pulmonary exam normal                OUTSIDE LABS:  CBC:   Lab Results   Component Value Date    WBC 7.9 09/06/2024    WBC 9.6 04/07/2022    HGB 13.8 09/06/2024    HGB 15.7 04/07/2022    HCT 40.8 09/06/2024    HCT 49.2 04/07/2022     09/06/2024     04/07/2022     BMP:   Lab Results   Component Value Date     11/14/2024     09/06/2024    POTASSIUM 4.5 11/14/2024    POTASSIUM 4.4 09/06/2024    CHLORIDE 100 11/14/2024    CHLORIDE 102 09/06/2024    CO2 28 11/14/2024    CO2 22 09/06/2024    BUN 16.3 11/14/2024    BUN 21.8 09/06/2024    CR 0.93 11/14/2024    CR 0.93 09/06/2024     (H) 11/14/2024     (H) 09/06/2024     COAGS:   Lab Results   Component Value Date    PTT 29 07/19/2020    INR 1.08 07/19/2020     POC: No results found for: \"BGM\", \"HCG\", \"HCGS\"  HEPATIC:   Lab Results   Component Value Date    ALBUMIN 3.9 09/06/2024    PROTTOTAL 6.6 09/06/2024    ALT 18 09/06/2024    AST 30 09/06/2024    ALKPHOS 61 09/06/2024    BILITOTAL 0.5 09/06/2024     OTHER:   Lab Results   Component Value Date    CHAPO 9.1 11/14/2024       Anesthesia Plan    ASA Status:  3    NPO Status:  NPO Appropriate    Anesthesia Type: General.     - Airway: ETT   Induction: Intravenous.   Maintenance: Balanced.   Techniques and Equipment:     - Airway: Video-Laryngoscope       Consents    Anesthesia Plan(s) and associated risks, benefits, and realistic alternatives discussed. Questions answered and patient/representative(s) expressed understanding.     - Discussed:     - Discussed with:  Patient            Postoperative Care    Pain management: IV analgesics, Multi-modal analgesia.   PONV prophylaxis: Ondansetron (or other 5HT-3), Dexamethasone or Solumedrol     Comments:               Vic Estrada MD    I have reviewed the pertinent notes and labs in the chart from the past 30 " days and (re)examined the patient.  Any updates or changes from those notes are reflected in this note.                             # Asthma: noted on problem list

## 2024-11-19 NOTE — OP NOTE
General Surgery Operative Note    PREOPERATIVE DIAGNOSIS:  Umbilical hernia without obstruction and without gangrene [K42.9]    POSTOPERATIVE DIAGNOSIS:  * No post-op diagnosis entered *    PROCEDURE:   Procedure(s):  Robotic umbilical hernia repair (3 cm hernia defect)    ANESTHESIA:  General.    SURGEON:  Alberto Bain MD    ASSISTANT:  Rupali Herrera PA-C.  Physician assistant first assistant was necessary during the performance of this procedure for expertise in patient positioning, prepping, draping, trocar placement, robot docking, instrument exchanges, suture passing, mesh placement, camera management, retraction and exposure, and suctioning.    INDICATIONS:  Symptomatic umbilical hernia    PROCEDURE:  The patient was taken to the operating suite and uneventfully endotracheally intubated.  The abdomen was prepped and draped in a sterile fashion.  Surgeon initiated timeout was acknowledged.  We entered the abdomen in the left upper quadrant using a 5 mm Visiport technique.  Two 8 mm reusable trochars were placed along the left flank.  The 5 mm trocar was swapped out for an 8 mm reusable.  The robot was docked and targeted toward the umbilical hernia.  A force bipolar and monopolar scissors were inserted into the abdomen and the abdomen was insufflated.    I visualized the hernia.  I took down the peritoneum starting close to the falciform ligament, trying to start at least 5 cm lateral to the hernia.  I continued creating this flap until I encountered the hernia.  I reduced the hernia and   the sac from the undersurface of the skin.  The dissection was carried rightward until I was at least 5 cm to the right.    I then took a 2-0 vicryl and tacked the umbilical skin to the fascia in an attempt to re-create a normal looking umbilicus.  The bedside assist was very helpful in helping me avoiding buttonholing the skin with the suture.  They also helped  suture placement to make it look the  best.    I then closed the fascia with 1 stratafix suture.  This was done in a two layer fashion, running the suture back and then forth.  A plastic ruler was introduced.  The space was measured and the ruler was removed.  A piece of Progrip mesh was cut to the appropriate size.  It was then placed into the abdominal cavity.  I unrolled it covering the defect at least 5 cm in each direction allowing the  side to fully engage.    I then closed the flap with 2-0 stratafix.  All mesh was completely covered with the patients own tissue.    At the completion of the mesh positioning and flap closure, robotic instruments were removed and the trochars were removed from the abdomen under laparoscopic visualization.  The robot was undocked.  The skin edges were reapproximated with 4-0 Vicryl and Steri-Strips.  The patient was uneventfully extubated, awakened and taken to the PACU in stable condition.  At the conclusion of the case, all lap and needle counts were correct.      ESTIMATED BLOOD LOSS:  * No blood loss amount entered *    INTRAOPERATIVE FINDINGS:  3 cm umbilical hernia.     Alberto Bain MD

## 2024-11-19 NOTE — ANESTHESIA CARE TRANSFER NOTE
Patient: Bear Guido    Procedure: Procedure(s):  Robotic umbilical hernia repair       Diagnosis: Umbilical hernia without obstruction and without gangrene [K42.9]  Diagnosis Additional Information: No value filed.    Anesthesia Type:   General     Note:    Oropharynx: oropharynx clear of all foreign objects and spontaneously breathing  Level of Consciousness: drowsy  Oxygen Supplementation: face mask  Level of Supplemental Oxygen (L/min / FiO2): 8  Independent Airway: airway patency satisfactory and stable  Dentition: dentition unchanged  Vital Signs Stable: post-procedure vital signs reviewed and stable  Report to RN Given: handoff report given  Patient transferred to: PACU    Handoff Report: Identifed the Patient, Identified the Reponsible Provider, Reviewed the pertinent medical history, Discussed the surgical course, Reviewed Intra-OP anesthesia mangement and issues during anesthesia, Set expectations for post-procedure period and Allowed opportunity for questions and acknowledgement of understanding      Vitals:  Vitals Value Taken Time   /84    Temp     Pulse 78 11/19/24 1334   Resp 9 11/19/24 1334   SpO2 100 % 11/19/24 1334   Vitals shown include unfiled device data.    Electronically Signed By: SUZETTE Arteaga CRNA  November 19, 2024  1:35 PM   220.4

## 2024-11-19 NOTE — ANESTHESIA PROCEDURE NOTES
Airway       Patient location during procedure: OR       Procedure Start/Stop Times: 11/19/2024 12:15 PM  Staff -        Anesthesiologist:  Vic Estrada MD       CRNA: Altagracia Ding APRN CRNA       Performed By: CRNA  Consent for Airway        Urgency: elective  Indications and Patient Condition       Indications for airway management: aditi-procedural       Induction type:intravenous       Mask difficulty assessment: 2 - vent by mask + OA or adjuvant +/- NMBA    Final Airway Details       Final airway type: endotracheal airway       Successful airway: ETT - single  Endotracheal Airway Details        ETT size (mm): 8.0       Cuffed: yes       Successful intubation technique: video laryngoscopy       VL Blade Size: Glidescope 4       Grade View of Cords: 1       Adjucts: stylet       Position: Right       Measured from: lips       Secured at (cm): 22       Bite block used: None    Post intubation assessment        Placement verified by: capnometry, equal breath sounds and chest rise        Number of attempts at approach: 1       Secured with: tape       Ease of procedure: easy       Dentition: Intact and Unchanged    Medication(s) Administered   Medication Administration Time: 11/19/2024 12:15 PM

## 2024-11-20 ENCOUNTER — TELEPHONE (OUTPATIENT)
Dept: SURGERY | Facility: CLINIC | Age: 79
End: 2024-11-20
Payer: COMMERCIAL

## 2024-11-20 NOTE — TELEPHONE ENCOUNTER
Procedure: Robotic umbilical hernia repair (3 cm hernia defect)     Date: 11/19/24    Surgeon: Taya    Patient wanting to discuss lifting restrictions as he has seen conflicting information.    Per his office visit note, Dr. Bain recommended 20 lbs x 2 weeks.  Informed patient of this.  His discharge instructions state 15 lbs for 2 - 3 weeks.  Informed him that the Physician Assistants write up the discharge instructions and they tend to be more reserved/careful than the surgeons.     Patient will receive a phone call 2 - 3 weeks after surgery. Recommend he maintain the restrictions until he receives that phone call    Encouraged him to call PRN with any questions or concerns    He is in agreement with this plan    Iris Ramos RN-BSN

## 2024-11-20 NOTE — TELEPHONE ENCOUNTER
Name of caller: Patient    Reason for Call:  post op questions  Wanting to know what the weight lifting lb. Limit is? And for how long?   Remembers Dr. Bain mentioning it, but now cannot remember what he said.  Ok to just leave a message if no answer.      Surgeon:  Alberto Bain MD    Recent Surgery:  Yes.    If yes, when & what type:  11/19/24    Robotic umbilical hernia repair       Best phone number to reach pt at is: [phone]    Ok to leave a message with medical info? Yes.

## 2024-11-26 ENCOUNTER — TELEPHONE (OUTPATIENT)
Dept: SURGERY | Facility: CLINIC | Age: 79
End: 2024-11-26
Payer: COMMERCIAL

## 2024-11-26 NOTE — TELEPHONE ENCOUNTER
Patient had a robotic umbilical hernia repair 11/20/24 [facility]. Lowest incision has a painful lump. Pain for 3 days    Please call    Phone: [phone]   Message ok

## 2024-11-26 NOTE — TELEPHONE ENCOUNTER
Procedure: robotic umbilical hernia repair (3 cm hernia defect)    Date: 11/19/24    Surgeon: Taya    Patient reporting pain involving lower incision. Also a painful lump. Started a couple of days ago. Area is about the size of a quarter    He has not been using ice or heat to the incision area.    Having bowel movements    Denies fever    Informed him could be fluid build up or could also be suture material trying to push out vs absorb.  Recommend alternating ice and heat over the area.  Also offered post op appointment tomorrow with Dr. Bain due to long holiday weekend coming up    Patient would like appointment for evaluation    Scheduled him to see Dr. Bain tomorrow, the 27th, at 9:30 am.  Advised him to alternate ice and heat to the area, several times today. If symptoms improve, he can cancel appointment tomorrow morning.    He agreed to plan    He will call PRN    Iris Ramos RN-BSN

## 2024-11-27 ENCOUNTER — OFFICE VISIT (OUTPATIENT)
Dept: SURGERY | Facility: CLINIC | Age: 79
End: 2024-11-27
Payer: COMMERCIAL

## 2024-11-27 DIAGNOSIS — T14.8XXA HEMATOMA: Primary | ICD-10-CM

## 2024-11-28 NOTE — PROGRESS NOTES
Surgery Consultation, [facility], MIRYAM Bain MD    Bear Guido MRN# [id number]   YOB: 1945 Age: 79 year old     Bear Guido is a 79 year old male who I did a robotic umbilical hernia repair on 11/19/2024.  He has had a lot of swelling at the lowest port site.    On examination I do not feel a hernia.  He does have significant eccymosis around the portsite and it is a little full.    I discussed that this is likely a hematoma and it will resolve in time.    I offered a follow up in 2-3 months, he will make one on an as needed basis.    Alberto Bain M.D., F.A.C.S.  [facility]  [facility]  [place]  [phone]

## 2024-12-25 DIAGNOSIS — F51.02 TRANSIENT INSOMNIA: ICD-10-CM

## 2024-12-26 RX ORDER — ZOLPIDEM TARTRATE 5 MG/1
5 TABLET ORAL
Qty: 15 TABLET | Refills: 1 | Status: SHIPPED | OUTPATIENT
Start: 2024-12-26

## 2025-04-05 ENCOUNTER — MYC MEDICAL ADVICE (OUTPATIENT)
Dept: FAMILY MEDICINE | Facility: CLINIC | Age: 80
End: 2025-04-05
Payer: COMMERCIAL

## 2025-04-05 DIAGNOSIS — M85.80 OSTEOPENIA, UNSPECIFIED LOCATION: ICD-10-CM

## 2025-04-05 DIAGNOSIS — M85.89 OTHER SPECIFIED DISORDERS OF BONE DENSITY AND STRUCTURE, MULTIPLE SITES: ICD-10-CM

## 2025-04-05 DIAGNOSIS — Z87.81 HX OF FRACTURE OF HIP: Primary | ICD-10-CM

## 2025-04-07 NOTE — TELEPHONE ENCOUNTER
He did have screening done in 2012, which showed osteopenia.  Will update.  Order placed    Hal Tenorio PA-C

## 2025-04-21 ENCOUNTER — HOSPITAL ENCOUNTER (OUTPATIENT)
Dept: BONE DENSITY | Facility: CLINIC | Age: 80
Discharge: HOME | End: 2025-04-21
Attending: PHYSICIAN ASSISTANT | Admitting: PHYSICIAN ASSISTANT
Payer: COMMERCIAL

## 2025-04-21 DIAGNOSIS — M85.89 OTHER SPECIFIED DISORDERS OF BONE DENSITY AND STRUCTURE, MULTIPLE SITES: ICD-10-CM

## 2025-04-21 DIAGNOSIS — Z87.81 HX OF FRACTURE OF HIP: ICD-10-CM

## 2025-04-21 PROCEDURE — 77080 DXA BONE DENSITY AXIAL: CPT

## 2025-08-11 ENCOUNTER — PATIENT OUTREACH (OUTPATIENT)
Dept: CARE COORDINATION | Facility: CLINIC | Age: 80
End: 2025-08-11
Payer: COMMERCIAL

## 2025-08-28 DIAGNOSIS — F51.02 TRANSIENT INSOMNIA: ICD-10-CM

## 2025-09-02 RX ORDER — ZOLPIDEM TARTRATE 5 MG/1
5 TABLET ORAL
Qty: 15 TABLET | Refills: 0 | Status: SHIPPED | OUTPATIENT
Start: 2025-09-02

## (undated) DEVICE — PACK LAP CHOLE SLC15LCFSD

## (undated) DEVICE — SOL WATER IRRIG 1000ML BOTTLE 2F7114

## (undated) DEVICE — DAVINCI XI DRAPE COLUMN 470341

## (undated) DEVICE — GOWN IMPERVIOUS SPECIALTY XLG/XLONG 32474

## (undated) DEVICE — SU STRATAFIX SYMMETRIC PDS PLUS #0 36CM CT-1 SXPP1A425

## (undated) DEVICE — LINEN TOWEL PACK X5 5464

## (undated) DEVICE — DAVINCI XI OBTURATOR BLADELESS 8MM 470359

## (undated) DEVICE — SU VICRYL 4-0 PS-2 18" UND J496H

## (undated) DEVICE — DRAPE SHEET REV FOLD 3/4 9349

## (undated) DEVICE — SUCTION CANISTER MEDIVAC LINER 3000ML W/LID 65651-530

## (undated) DEVICE — SU VICRYL 2-0 CT-2 27" UND J269H

## (undated) DEVICE — GLOVE BIOGEL PI MICRO SZ 8.0 48580

## (undated) DEVICE — CLEANER INST PRE-KLENZ SOAK SHIELD TUBE 6 ML MEDIUM 2D66J4

## (undated) DEVICE — ESU GROUND PAD UNIVERSAL W/O CORD

## (undated) DEVICE — GLOVE BIOGEL PI MICRO INDICATOR UNDERGLOVE SZ 8.0 48980

## (undated) DEVICE — LIGHT HANDLE X2

## (undated) DEVICE — PREP CHLORAPREP 26ML TINTED HI-LITE ORANGE 930815

## (undated) DEVICE — DAVINCI XI DRAPE ARM 470015

## (undated) DEVICE — DAVINCI HOT SHEARS TIP COVER  400180

## (undated) DEVICE — DAVINCI XI SEAL UNIVERSAL 5-12MM 470500

## (undated) DEVICE — DECANTER BAG 2002S

## (undated) DEVICE — ANTIFOG SOLUTION SEE SHARP 150M TROCAR SWABS 30978 (COI)

## (undated) RX ORDER — HEPARIN SODIUM 5000 [USP'U]/.5ML
INJECTION, SOLUTION INTRAVENOUS; SUBCUTANEOUS
Status: DISPENSED
Start: 2024-11-19

## (undated) RX ORDER — HYDROCODONE BITARTRATE AND ACETAMINOPHEN 5; 325 MG/1; MG/1
TABLET ORAL
Status: DISPENSED
Start: 2024-11-19

## (undated) RX ORDER — FENTANYL CITRATE 0.05 MG/ML
INJECTION, SOLUTION INTRAMUSCULAR; INTRAVENOUS
Status: DISPENSED
Start: 2024-11-19

## (undated) RX ORDER — FENTANYL CITRATE 50 UG/ML
INJECTION, SOLUTION INTRAMUSCULAR; INTRAVENOUS
Status: DISPENSED
Start: 2017-10-10

## (undated) RX ORDER — FENTANYL CITRATE 50 UG/ML
INJECTION, SOLUTION INTRAMUSCULAR; INTRAVENOUS
Status: DISPENSED
Start: 2024-11-19

## (undated) RX ORDER — BUPIVACAINE HYDROCHLORIDE AND EPINEPHRINE 2.5; 5 MG/ML; UG/ML
INJECTION, SOLUTION EPIDURAL; INFILTRATION; INTRACAUDAL; PERINEURAL
Status: DISPENSED
Start: 2024-11-19